# Patient Record
Sex: MALE | Race: WHITE | URBAN - METROPOLITAN AREA
[De-identification: names, ages, dates, MRNs, and addresses within clinical notes are randomized per-mention and may not be internally consistent; named-entity substitution may affect disease eponyms.]

---

## 2021-12-03 ENCOUNTER — OUTPATIENT (OUTPATIENT)
Dept: OUTPATIENT SERVICES | Facility: HOSPITAL | Age: 76
LOS: 1 days | Discharge: HOME | End: 2021-12-03

## 2021-12-03 VITALS
DIASTOLIC BLOOD PRESSURE: 78 MMHG | OXYGEN SATURATION: 97 % | WEIGHT: 179.9 LBS | HEIGHT: 70 IN | SYSTOLIC BLOOD PRESSURE: 133 MMHG | RESPIRATION RATE: 18 BRPM | TEMPERATURE: 97 F | HEART RATE: 95 BPM

## 2021-12-03 VITALS
DIASTOLIC BLOOD PRESSURE: 70 MMHG | SYSTOLIC BLOOD PRESSURE: 136 MMHG | HEART RATE: 91 BPM | RESPIRATION RATE: 18 BRPM | OXYGEN SATURATION: 97 %

## 2021-12-03 DIAGNOSIS — Z98.890 OTHER SPECIFIED POSTPROCEDURAL STATES: Chronic | ICD-10-CM

## 2021-12-03 NOTE — PRE-ANESTHESIA EVALUATION ADULT - NSANTHOSAYNRD_GEN_A_CORE
No. CHRISS screening performed.  STOP BANG Legend: 0-2 = LOW Risk; 3-4 = INTERMEDIATE Risk; 5-8 = HIGH Risk

## 2021-12-03 NOTE — ASU DISCHARGE PLAN (ADULT/PEDIATRIC) - NS MD DC FALL RISK RISK
For information on Fall & Injury Prevention, visit: [email or web address] OR  [email or web address] OR  https://www.cdc.gov/steadi/patient.html

## 2021-12-03 NOTE — ASU PATIENT PROFILE, ADULT - NSICDXPASTSURGICALHX_GEN_ALL_CORE_FT
PAST SURGICAL HISTORY:  H/O basal cell carcinoma excision     H/O carpal tunnel repair     H/O melanoma excision

## 2021-12-03 NOTE — ASU PATIENT PROFILE, ADULT - FALL HARM RISK - UNIVERSAL INTERVENTIONS
Bed in lowest position, wheels locked, appropriate side rails in place/Call bell, personal items and telephone in reach/Instruct patient to call for assistance before getting out of bed or chair/Non-slip footwear when patient is out of bed/[place] to call system/Physically safe environment - no spills, clutter or unnecessary equipment/Purposeful Proactive Rounding/Room/bathroom lighting operational, light cord in reach

## 2021-12-09 DIAGNOSIS — F17.200 NICOTINE DEPENDENCE, UNSPECIFIED, UNCOMPLICATED: ICD-10-CM

## 2021-12-09 DIAGNOSIS — H26.8 OTHER SPECIFIED CATARACT: ICD-10-CM

## 2021-12-09 DIAGNOSIS — J44.9 CHRONIC OBSTRUCTIVE PULMONARY DISEASE, UNSPECIFIED: ICD-10-CM

## 2021-12-09 DIAGNOSIS — Z85.820 PERSONAL HISTORY OF MALIGNANT MELANOMA OF SKIN: ICD-10-CM

## 2021-12-09 DIAGNOSIS — Z85.828 PERSONAL HISTORY OF OTHER MALIGNANT NEOPLASM OF SKIN: ICD-10-CM

## 2021-12-10 ENCOUNTER — OUTPATIENT (OUTPATIENT)
Dept: OUTPATIENT SERVICES | Facility: HOSPITAL | Age: 76
LOS: 1 days | Discharge: HOME | End: 2021-12-10

## 2021-12-10 VITALS
TEMPERATURE: 98 F | WEIGHT: 175.05 LBS | RESPIRATION RATE: 18 BRPM | SYSTOLIC BLOOD PRESSURE: 131 MMHG | HEIGHT: 70 IN | HEART RATE: 69 BPM | DIASTOLIC BLOOD PRESSURE: 73 MMHG

## 2021-12-10 VITALS — SYSTOLIC BLOOD PRESSURE: 137 MMHG | DIASTOLIC BLOOD PRESSURE: 71 MMHG | HEART RATE: 66 BPM | RESPIRATION RATE: 17 BRPM

## 2021-12-10 DIAGNOSIS — Z98.890 OTHER SPECIFIED POSTPROCEDURAL STATES: Chronic | ICD-10-CM

## 2021-12-10 DIAGNOSIS — H26.40 UNSPECIFIED SECONDARY CATARACT: Chronic | ICD-10-CM

## 2021-12-10 NOTE — ASU DISCHARGE PLAN (ADULT/PEDIATRIC) - NS MD DC FALL RISK RISK
For information on Fall & Injury Prevention, visit: [email or web address] OR  [email or web address] OR  https://www.cdc.gov/steadi/patient.html

## 2021-12-10 NOTE — ASU PATIENT PROFILE, ADULT - FALL HARM RISK - HARM RISK INTERVENTIONS

## 2021-12-16 DIAGNOSIS — H26.8 OTHER SPECIFIED CATARACT: ICD-10-CM

## 2021-12-16 DIAGNOSIS — J44.9 CHRONIC OBSTRUCTIVE PULMONARY DISEASE, UNSPECIFIED: ICD-10-CM

## 2021-12-16 DIAGNOSIS — Z85.820 PERSONAL HISTORY OF MALIGNANT MELANOMA OF SKIN: ICD-10-CM

## 2021-12-16 DIAGNOSIS — Z85.828 PERSONAL HISTORY OF OTHER MALIGNANT NEOPLASM OF SKIN: ICD-10-CM

## 2021-12-16 DIAGNOSIS — Z72.0 TOBACCO USE: ICD-10-CM

## 2023-01-25 ENCOUNTER — NON-APPOINTMENT (OUTPATIENT)
Age: 78
End: 2023-01-25

## 2023-02-08 NOTE — ASU DISCHARGE PLAN (ADULT/PEDIATRIC) - ***IN THE EVENT THAT YOU DEVELOP A COMPLICATION AND YOU ARE UNABLE TO REACH YOUR OWN PHYSICIAN, YOU MAY CONTACT:
Called and lvm with patient to reschedule Carotid scan and appt with [facility]. Statement Selected

## 2023-02-15 ENCOUNTER — NON-APPOINTMENT (OUTPATIENT)
Age: 78
End: 2023-02-15

## 2023-02-17 ENCOUNTER — INPATIENT (INPATIENT)
Facility: HOSPITAL | Age: 78
LOS: 3 days | Discharge: HOME | DRG: 200 | End: 2023-02-21
Attending: HOSPITALIST | Admitting: HOSPITALIST
Payer: COMMERCIAL

## 2023-02-17 VITALS
OXYGEN SATURATION: 86 % | SYSTOLIC BLOOD PRESSURE: 155 MMHG | WEIGHT: 160.06 LBS | TEMPERATURE: 97 F | DIASTOLIC BLOOD PRESSURE: 89 MMHG | HEART RATE: 104 BPM | RESPIRATION RATE: 22 BRPM

## 2023-02-17 DIAGNOSIS — H26.40 UNSPECIFIED SECONDARY CATARACT: Chronic | ICD-10-CM

## 2023-02-17 DIAGNOSIS — Z98.890 OTHER SPECIFIED POSTPROCEDURAL STATES: Chronic | ICD-10-CM

## 2023-02-17 LAB
ALBUMIN SERPL ELPH-MCNC: 4 G/DL — SIGNIFICANT CHANGE UP (ref 3.5–5.2)
ALP SERPL-CCNC: 115 U/L — SIGNIFICANT CHANGE UP (ref 30–115)
ALT FLD-CCNC: 15 U/L — SIGNIFICANT CHANGE UP (ref 0–41)
ANION GAP SERPL CALC-SCNC: 10 MMOL/L — SIGNIFICANT CHANGE UP (ref 7–14)
APTT BLD: 33.1 SEC — SIGNIFICANT CHANGE UP (ref 27–39.2)
AST SERPL-CCNC: 18 U/L — SIGNIFICANT CHANGE UP (ref 0–41)
BASE EXCESS BLDV CALC-SCNC: 0.4 MMOL/L — SIGNIFICANT CHANGE UP (ref -2–3)
BASOPHILS # BLD AUTO: 0.05 K/UL — SIGNIFICANT CHANGE UP (ref 0–0.2)
BASOPHILS NFR BLD AUTO: 0.5 % — SIGNIFICANT CHANGE UP (ref 0–1)
BILIRUB SERPL-MCNC: <0.2 MG/DL — SIGNIFICANT CHANGE UP (ref 0.2–1.2)
BUN SERPL-MCNC: 22 MG/DL — HIGH (ref 10–20)
CA-I SERPL-SCNC: 1.21 MMOL/L — SIGNIFICANT CHANGE UP (ref 1.15–1.33)
CALCIUM SERPL-MCNC: 9.4 MG/DL — SIGNIFICANT CHANGE UP (ref 8.4–10.4)
CHLORIDE SERPL-SCNC: 105 MMOL/L — SIGNIFICANT CHANGE UP (ref 98–110)
CO2 SERPL-SCNC: 26 MMOL/L — SIGNIFICANT CHANGE UP (ref 17–32)
CREAT SERPL-MCNC: 0.9 MG/DL — SIGNIFICANT CHANGE UP (ref 0.7–1.5)
EGFR: 87 ML/MIN/1.73M2 — SIGNIFICANT CHANGE UP
EOSINOPHIL # BLD AUTO: 0.02 K/UL — SIGNIFICANT CHANGE UP (ref 0–0.7)
EOSINOPHIL NFR BLD AUTO: 0.2 % — SIGNIFICANT CHANGE UP (ref 0–8)
GAS PNL BLDV: 136 MMOL/L — SIGNIFICANT CHANGE UP (ref 136–145)
GAS PNL BLDV: SIGNIFICANT CHANGE UP
GLUCOSE SERPL-MCNC: 130 MG/DL — HIGH (ref 70–99)
HCO3 BLDV-SCNC: 27 MMOL/L — SIGNIFICANT CHANGE UP (ref 22–29)
HCT VFR BLD CALC: 46 % — SIGNIFICANT CHANGE UP (ref 42–52)
HCT VFR BLDA CALC: 46 % — SIGNIFICANT CHANGE UP (ref 39–51)
HGB BLD CALC-MCNC: 15.3 G/DL — SIGNIFICANT CHANGE UP (ref 12.6–17.4)
HGB BLD-MCNC: 14.8 G/DL — SIGNIFICANT CHANGE UP (ref 14–18)
IMM GRANULOCYTES NFR BLD AUTO: 0.3 % — SIGNIFICANT CHANGE UP (ref 0.1–0.3)
INR BLD: 1.02 RATIO — SIGNIFICANT CHANGE UP (ref 0.65–1.3)
LACTATE BLDV-MCNC: 2 MMOL/L — SIGNIFICANT CHANGE UP (ref 0.5–2)
LYMPHOCYTES # BLD AUTO: 1.45 K/UL — SIGNIFICANT CHANGE UP (ref 1.2–3.4)
LYMPHOCYTES # BLD AUTO: 15.4 % — LOW (ref 20.5–51.1)
MAGNESIUM SERPL-MCNC: 2 MG/DL — SIGNIFICANT CHANGE UP (ref 1.8–2.4)
MCHC RBC-ENTMCNC: 30.7 PG — SIGNIFICANT CHANGE UP (ref 27–31)
MCHC RBC-ENTMCNC: 32.2 G/DL — SIGNIFICANT CHANGE UP (ref 32–37)
MCV RBC AUTO: 95.4 FL — HIGH (ref 80–94)
MONOCYTES # BLD AUTO: 0.22 K/UL — SIGNIFICANT CHANGE UP (ref 0.1–0.6)
MONOCYTES NFR BLD AUTO: 2.3 % — SIGNIFICANT CHANGE UP (ref 1.7–9.3)
NEUTROPHILS # BLD AUTO: 7.66 K/UL — HIGH (ref 1.4–6.5)
NEUTROPHILS NFR BLD AUTO: 81.3 % — HIGH (ref 42.2–75.2)
NRBC # BLD: 0 /100 WBCS — SIGNIFICANT CHANGE UP (ref 0–0)
NT-PROBNP SERPL-SCNC: 721 PG/ML — HIGH (ref 0–300)
PCO2 BLDV: 52 MMHG — SIGNIFICANT CHANGE UP (ref 42–55)
PH BLDV: 7.33 — SIGNIFICANT CHANGE UP (ref 7.32–7.43)
PLATELET # BLD AUTO: 252 K/UL — SIGNIFICANT CHANGE UP (ref 130–400)
PO2 BLDV: 36 MMHG — SIGNIFICANT CHANGE UP
POTASSIUM BLDV-SCNC: 5.1 MMOL/L — SIGNIFICANT CHANGE UP (ref 3.5–5.1)
POTASSIUM SERPL-MCNC: 4.8 MMOL/L — SIGNIFICANT CHANGE UP (ref 3.5–5)
POTASSIUM SERPL-SCNC: 4.8 MMOL/L — SIGNIFICANT CHANGE UP (ref 3.5–5)
PROT SERPL-MCNC: 7.5 G/DL — SIGNIFICANT CHANGE UP (ref 6–8)
PROTHROM AB SERPL-ACNC: 11.6 SEC — SIGNIFICANT CHANGE UP (ref 9.95–12.87)
RBC # BLD: 4.82 M/UL — SIGNIFICANT CHANGE UP (ref 4.7–6.1)
RBC # FLD: 13.6 % — SIGNIFICANT CHANGE UP (ref 11.5–14.5)
SAO2 % BLDV: 56.4 % — SIGNIFICANT CHANGE UP
SARS-COV-2 RNA SPEC QL NAA+PROBE: SIGNIFICANT CHANGE UP
SODIUM SERPL-SCNC: 141 MMOL/L — SIGNIFICANT CHANGE UP (ref 135–146)
TROPONIN T SERPL-MCNC: <0.01 NG/ML — SIGNIFICANT CHANGE UP
WBC # BLD: 9.43 K/UL — SIGNIFICANT CHANGE UP (ref 4.8–10.8)
WBC # FLD AUTO: 9.43 K/UL — SIGNIFICANT CHANGE UP (ref 4.8–10.8)

## 2023-02-17 PROCEDURE — 84100 ASSAY OF PHOSPHORUS: CPT

## 2023-02-17 PROCEDURE — 83735 ASSAY OF MAGNESIUM: CPT

## 2023-02-17 PROCEDURE — 71275 CT ANGIOGRAPHY CHEST: CPT | Mod: 26,MA

## 2023-02-17 PROCEDURE — 99406 BEHAV CHNG SMOKING 3-10 MIN: CPT

## 2023-02-17 PROCEDURE — 99223 1ST HOSP IP/OBS HIGH 75: CPT

## 2023-02-17 PROCEDURE — 80048 BASIC METABOLIC PNL TOTAL CA: CPT

## 2023-02-17 PROCEDURE — 71045 X-RAY EXAM CHEST 1 VIEW: CPT

## 2023-02-17 PROCEDURE — 71045 X-RAY EXAM CHEST 1 VIEW: CPT | Mod: 26,76

## 2023-02-17 PROCEDURE — 93005 ELECTROCARDIOGRAM TRACING: CPT

## 2023-02-17 PROCEDURE — 36415 COLL VENOUS BLD VENIPUNCTURE: CPT

## 2023-02-17 PROCEDURE — 80053 COMPREHEN METABOLIC PANEL: CPT

## 2023-02-17 PROCEDURE — 85027 COMPLETE CBC AUTOMATED: CPT

## 2023-02-17 PROCEDURE — 93010 ELECTROCARDIOGRAM REPORT: CPT

## 2023-02-17 PROCEDURE — 94640 AIRWAY INHALATION TREATMENT: CPT

## 2023-02-17 NOTE — ED PROVIDER NOTE - CONSIDERATION OF ADMISSION OBSERVATION
Patient requires inpatient hospitalization - monitored setting. Consideration of Admission/Observation

## 2023-02-17 NOTE — ED PROVIDER NOTE - CLINICAL SUMMARY MEDICAL DECISION MAKING FREE TEXT BOX
77-year-old male with a past medical history of COPD, left lung cancer, status post left lung biopsy 4 days ago at [facility] complicated by pneumothorax and chest tube placement, chest tube removed 3 days ago presents for shortness of breath that has progressively been getting worse throughout the day.  Noted to be saturating in the high 80s here, only a few word sentences.  Lungs with decreased breath sounds on the left, clear on the right.  Chest x-ray with large pneumothorax.  Clinically not tension.  Also with left leg swelling.  States that he had not noticed any difference recently.  Left-sided pigtail placed with improvement in reexpansion.  CT done and negative for PE.  Labs overall reassuring.  EKG nonischemic.  Admitted to medicine.  On a couple of liters nasal cannula on admission with no respiratory distress

## 2023-02-17 NOTE — ED PROCEDURE NOTE - CPROC ED SITE PREP1
Your plan is as follows:   1. Follow up in 2 years with MRA brain WO contrast. We will call you in 1.5 years to schedule   2. Continue all medications as prescribed  3. Check blood pressure regularly, goal being 130/80.      SEEK IMMEDIATE MEDICAL ATTENTION OR CALL 911 for any sudden change in neurological/mental status such as:   · B: (Balance) Loss of balance, dizziness, or lightheadedness  · E: (Eyes) loss of vision, blurry vision, double vision, change in vision  · F: (Face) Facial droop, typically one-sided   · A: (Arm) Arm or leg weakness, numbness, or tingling, typically on one side of the body  · S: (Speech) Speech difficulties: trouble speaking, understanding others, confusion, or slurred speech  · T: (Terrible headache) Terrible headache that comes on suddenly, often described as the worse headache of your life          If you are taking antiplatelet medications  Signs and symptoms of bleeding should be monitored closely while taking antiplatelet medications. Antiplatelet medications are aspirin, Plavix (clopidogrel), or Brilinta (ticagrelor). These signs and symptoms include:  ? Coffee ground emesis (vomit which looks like coffee grounds)   ? Tarry stools   ? Nose bleeds   ? Excessive gum bleeding with teeth brushing   ? Blood in urine     If you are taking aspirin, Plavix (clopidgrel), or Brilinta (ticagrelor):   We may ask you to have these medications monitored by having blood drawn. A lab known as an aspirin response (ASAr) will assess whether or not the current dose of aspirin you are taking is providing a therapeutic response. Another lab, known as a Platelet P2Y12, assesses whether the dose of Plavix or Brilinta you are taking is providing a therapeutic response. These labs are very time sensitive and must be completed within one hour of being drawn. With that restriction, they can only be completed at certain labs in certain locations:     [facility]  [address] [place] [address]  Phone: [phone]  Hours: Monday - Friday 6:00am to 5:00pm   Saturday 5:00am to 2:00pm    [facility] - ACL Lab  [address] [place] [address]  Phone: [phone]  Hours: Monday - Friday 8:00am to 5:00pm    [facility] - ACL Lab  [address] [place] [address]  Phone: [phone]  Hours: Monday - Thursday 7:00am to 6:00pm   Friday 7:00am - 5:30pm   Saturday: 7:30am - 12:00pm       Contact Information  : [phone]  Our office hours are 8:00am to 4:30pm. You would contact the  if you are unable to make an appointment, would like to reschedule an appointment, verify an appointment date or time, or ask a non-emergent question. The staff at the  will assist you in a timely manner.     Surgery Schedulers: [phone]  You would contact the surgery schedulers if you are due to be scheduled for a diagnostic cerebral angiogram or a procedure. You may speak with any of our surgery schedulers for this: Cyndee Donato or Tesha.     RNs: [phone]  You would contact the RNs with any symptoms you may be experiencing or any questions that require prompt attention. The RNs will contact the Neuro Interventional team to get recommendations as needed. You may speak with any of our RNs: Angela Scruggs Kayla, Savannah or Geovanny    Pre-services Department: [phone]  You would contact the pre-services department to schedule any imaging that our services orders, such as: MRI, MRA, CT, CTA, or Ultrasound. They will work with you to schedule in a timeframe which works for you and will try to schedule it closer to your home if possible. Some tests can only be completed at [facility] due to the specialized equipment needed.     [place] Physician Referral Line: [phone]  You would contact this department for any referrals you may have received for specialists, therapy, or other  chlorhexidine services. They will assist in finding providers in your area.     Neurodiagnostics Department: [phone]  You would contact the neurodiagnostics department to schedule a TCD (transcranial doppler), if ordered by your physician.       Thank you for choosing the [facility] [place], Leeann Early MD, and our team as your Neuro Interventional Specialists.  We actively use feedback to constantly improve and deliver the best care possible. To provide the best experience we are collecting feedback from you on how we performed.  You may receive a survey in the mail to evaluate how we did. Please take a moment and share your thoughts.      If for any reason you feel that we did not meet your expectations or you want to share a positive experience, please give us a call. Your feedback helps us know how we are doing and what we can be doing better.                                                   The Mediterranean Diet          Mediterranean diet basics:  • Daily consumption of vegetables, fruits, whole grains, and healthy fats (like olive oil or mashed avocado)  • Weekly intake of fish, poultry, beans and eggs  • Moderate portions of dairy products  • Limited intake of red meat  • Red wine in moderation  • Avoid processed foods    Sample diet:  Breakfast:  1 cup greek yogurt toped with 1/2 cup fresh fruit and 1-2 ounces of chopped nuts  Lunch:  2 cups greens topped with tomatoes, olives and vinaigrette dressing.  Christianne bread with hummus.  Dinner:  Baked salmon with roasted potatoes and broccoli          Activity Recommendations      Either 150 minutes per week of moderate activity which breaks down to 30 minutes 5 days a week or 75 minutes of vigorous activity per week.    Moderate activity examples:  • Brisk walking (at least 2.5 mph)  • Water aerobics  • Dancing (ballroom or social)  • Gardening  • Tennis (doubles)  • Biking slower than 10 mph    Vigorous activity examples:  • Hiking uphill or  with a heavy backpack  • Running  • Swimming laps  • Aerobic dancing  • Heavy yard work (such as continuous digging or hoeing)  • Tennis (singles)  • Biking 10 mph or faster  • Jumping rope

## 2023-02-17 NOTE — ED PROVIDER NOTE - OBJECTIVE STATEMENT
78 yo M with PMH of COPD not on home O2, Lung CA recently diagnosed s/p biopsy ([facility]) on 2/13 c/b PTX s/p chest tube taken out after 2 days presenting for SOB that started yesterday and got acutely worse today. Patient notes his sat was in high 80s today (normal 94-95% on RA). Endorses non-productive cough for the past few days. Also endorses LLE edema. Patient denies fever, cough, congestion, travel, sick contacts, hormone use, NVD, dysuria.

## 2023-02-17 NOTE — ED PROVIDER NOTE - PHYSICAL EXAMINATION
CONSTITUTIONAL: well-appearing, in NAD  SKIN: Warm dry, normal skin turgor  HEAD: NCAT  EYES: EOMI, PERRLA, no scleral icterus, conjunctiva pink  ENT: normal pharynx with no erythema or exudates  NECK: Supple; non tender. Full ROM.  CARD: RRR, no murmurs.  RESP: decreased breath sounds on L;   ABD: soft, non-tender, non-distended, no rebound or guarding.  EXT: Full ROM, no bony tenderness, no pedal edema, no calf tenderness  NEURO: normal motor. normal sensory. Normal gait.  PSYCH: Cooperative, appropriate.

## 2023-02-17 NOTE — PROCEDURAL SAFETY CHECKLIST WITH OR WITHOUT SEDATION - NSBEDADDLTIME7_GEN_ALL_CORE
[No Acute Distress] : no acute distress [Normal Appearance] : normal appearance [Normal Oropharynx] : normal oropharynx [IV] : Mallampati Class: IV [Normal Rate/Rhythm] : normal rate/rhythm [No Neck Mass] : no neck mass [Normal S1, S2] : normal s1, s2 [No Resp Distress] : no resp distress [No Murmurs] : no murmurs [Clear to Auscultation Bilaterally] : clear to auscultation bilaterally [Normal Gait] : normal gait [No Abnormalities] : no abnormalities [Benign] : benign [No Clubbing] : no clubbing [No Cyanosis] : no cyanosis [Normal Color/ Pigmentation] : normal color/ pigmentation [FROM] : FROM [No Edema] : no edema [No Focal Deficits] : no focal deficits [Oriented x3] : oriented x3 [Normal Affect] : normal affect not applicable

## 2023-02-17 NOTE — ED ADULT TRIAGE NOTE - CHIEF COMPLAINT QUOTE
Patient presents to ED with SOB. Patient had a R lung biopsy on Monday then had a pneumothorax which required a chest tube and patient was discharged Tuesday. Last lung biopsy 1/30/23 on left lung showed lung CA. Patient unable to speak in full sentences, tachypneic in triage.

## 2023-02-18 DIAGNOSIS — J93.9 PNEUMOTHORAX, UNSPECIFIED: ICD-10-CM

## 2023-02-18 PROBLEM — J44.9 CHRONIC OBSTRUCTIVE PULMONARY DISEASE, UNSPECIFIED: Chronic | Status: ACTIVE | Noted: 2021-12-03

## 2023-02-18 LAB
ALBUMIN SERPL ELPH-MCNC: 3.7 G/DL — SIGNIFICANT CHANGE UP (ref 3.5–5.2)
ALP SERPL-CCNC: 99 U/L — SIGNIFICANT CHANGE UP (ref 30–115)
ALT FLD-CCNC: 14 U/L — SIGNIFICANT CHANGE UP (ref 0–41)
ANION GAP SERPL CALC-SCNC: 11 MMOL/L — SIGNIFICANT CHANGE UP (ref 7–14)
ANION GAP SERPL CALC-SCNC: 9 MMOL/L — SIGNIFICANT CHANGE UP (ref 7–14)
AST SERPL-CCNC: 17 U/L — SIGNIFICANT CHANGE UP (ref 0–41)
BILIRUB SERPL-MCNC: 0.3 MG/DL — SIGNIFICANT CHANGE UP (ref 0.2–1.2)
BUN SERPL-MCNC: 16 MG/DL — SIGNIFICANT CHANGE UP (ref 10–20)
BUN SERPL-MCNC: 21 MG/DL — HIGH (ref 10–20)
CALCIUM SERPL-MCNC: 8.5 MG/DL — SIGNIFICANT CHANGE UP (ref 8.4–10.5)
CALCIUM SERPL-MCNC: 9.3 MG/DL — SIGNIFICANT CHANGE UP (ref 8.4–10.5)
CHLORIDE SERPL-SCNC: 103 MMOL/L — SIGNIFICANT CHANGE UP (ref 98–110)
CHLORIDE SERPL-SCNC: 107 MMOL/L — SIGNIFICANT CHANGE UP (ref 98–110)
CO2 SERPL-SCNC: 25 MMOL/L — SIGNIFICANT CHANGE UP (ref 17–32)
CO2 SERPL-SCNC: 29 MMOL/L — SIGNIFICANT CHANGE UP (ref 17–32)
CREAT SERPL-MCNC: 0.9 MG/DL — SIGNIFICANT CHANGE UP (ref 0.7–1.5)
CREAT SERPL-MCNC: 1.1 MG/DL — SIGNIFICANT CHANGE UP (ref 0.7–1.5)
EGFR: 68 ML/MIN/1.73M2 — SIGNIFICANT CHANGE UP
EGFR: 87 ML/MIN/1.73M2 — SIGNIFICANT CHANGE UP
GLUCOSE SERPL-MCNC: 173 MG/DL — HIGH (ref 70–99)
GLUCOSE SERPL-MCNC: 70 MG/DL — SIGNIFICANT CHANGE UP (ref 70–99)
MAGNESIUM SERPL-MCNC: 2 MG/DL — SIGNIFICANT CHANGE UP (ref 1.8–2.4)
PHOSPHATE SERPL-MCNC: 3 MG/DL — SIGNIFICANT CHANGE UP (ref 2.1–4.9)
POTASSIUM SERPL-MCNC: 4.5 MMOL/L — SIGNIFICANT CHANGE UP (ref 3.5–5)
POTASSIUM SERPL-MCNC: 5.8 MMOL/L — HIGH (ref 3.5–5)
POTASSIUM SERPL-SCNC: 4.5 MMOL/L — SIGNIFICANT CHANGE UP (ref 3.5–5)
POTASSIUM SERPL-SCNC: 5.8 MMOL/L — HIGH (ref 3.5–5)
PROT SERPL-MCNC: 6.5 G/DL — SIGNIFICANT CHANGE UP (ref 6–8)
SODIUM SERPL-SCNC: 139 MMOL/L — SIGNIFICANT CHANGE UP (ref 135–146)
SODIUM SERPL-SCNC: 145 MMOL/L — SIGNIFICANT CHANGE UP (ref 135–146)

## 2023-02-18 PROCEDURE — 93010 ELECTROCARDIOGRAM REPORT: CPT

## 2023-02-18 RX ORDER — SODIUM ZIRCONIUM CYCLOSILICATE 10 G/10G
10 POWDER, FOR SUSPENSION ORAL ONCE
Refills: 0 | Status: COMPLETED | OUTPATIENT
Start: 2023-02-18 | End: 2023-02-18

## 2023-02-18 RX ORDER — IPRATROPIUM/ALBUTEROL SULFATE 18-103MCG
3 AEROSOL WITH ADAPTER (GRAM) INHALATION EVERY 6 HOURS
Refills: 0 | Status: DISCONTINUED | OUTPATIENT
Start: 2023-02-18 | End: 2023-02-21

## 2023-02-18 RX ORDER — INSULIN HUMAN 100 [IU]/ML
10 INJECTION, SOLUTION SUBCUTANEOUS ONCE
Refills: 0 | Status: COMPLETED | OUTPATIENT
Start: 2023-02-18 | End: 2023-02-18

## 2023-02-18 RX ORDER — BUDESONIDE AND FORMOTEROL FUMARATE DIHYDRATE 160; 4.5 UG/1; UG/1
2 AEROSOL RESPIRATORY (INHALATION)
Refills: 0 | Status: DISCONTINUED | OUTPATIENT
Start: 2023-02-18 | End: 2023-02-18

## 2023-02-18 RX ORDER — ENOXAPARIN SODIUM 100 MG/ML
40 INJECTION SUBCUTANEOUS EVERY 24 HOURS
Refills: 0 | Status: DISCONTINUED | OUTPATIENT
Start: 2023-02-18 | End: 2023-02-21

## 2023-02-18 RX ORDER — DEXTROSE 50 % IN WATER 50 %
50 SYRINGE (ML) INTRAVENOUS ONCE
Refills: 0 | Status: COMPLETED | OUTPATIENT
Start: 2023-02-18 | End: 2023-02-18

## 2023-02-18 RX ORDER — BUDESONIDE AND FORMOTEROL FUMARATE DIHYDRATE 160; 4.5 UG/1; UG/1
2 AEROSOL RESPIRATORY (INHALATION)
Refills: 0 | Status: DISCONTINUED | OUTPATIENT
Start: 2023-02-18 | End: 2023-02-21

## 2023-02-18 RX ORDER — INFLUENZA VIRUS VACCINE 15; 15; 15; 15 UG/.5ML; UG/.5ML; UG/.5ML; UG/.5ML
0.7 SUSPENSION INTRAMUSCULAR ONCE
Refills: 0 | Status: DISCONTINUED | OUTPATIENT
Start: 2023-02-18 | End: 2023-02-21

## 2023-02-18 RX ORDER — TIOTROPIUM BROMIDE 18 UG/1
2 CAPSULE ORAL; RESPIRATORY (INHALATION) DAILY
Refills: 0 | Status: DISCONTINUED | OUTPATIENT
Start: 2023-02-18 | End: 2023-02-21

## 2023-02-18 RX ADMIN — INSULIN HUMAN 10 UNIT(S): 100 INJECTION, SOLUTION SUBCUTANEOUS at 12:45

## 2023-02-18 RX ADMIN — Medication 60 MILLIGRAM(S): at 11:15

## 2023-02-18 RX ADMIN — BUDESONIDE AND FORMOTEROL FUMARATE DIHYDRATE 2 PUFF(S): 160; 4.5 AEROSOL RESPIRATORY (INHALATION) at 21:40

## 2023-02-18 RX ADMIN — BUDESONIDE AND FORMOTEROL FUMARATE DIHYDRATE 2 PUFF(S): 160; 4.5 AEROSOL RESPIRATORY (INHALATION) at 09:22

## 2023-02-18 RX ADMIN — Medication 3 MILLILITER(S): at 13:15

## 2023-02-18 RX ADMIN — Medication 3 MILLILITER(S): at 20:32

## 2023-02-18 RX ADMIN — ENOXAPARIN SODIUM 40 MILLIGRAM(S): 100 INJECTION SUBCUTANEOUS at 05:38

## 2023-02-18 RX ADMIN — Medication 50 MILLILITER(S): at 12:46

## 2023-02-18 RX ADMIN — SODIUM ZIRCONIUM CYCLOSILICATE 10 GRAM(S): 10 POWDER, FOR SUSPENSION ORAL at 12:46

## 2023-02-18 NOTE — PATIENT PROFILE ADULT - NS PRO AD NO ADVANCE DIRECTIVE
Patient states he has DNR/DNI in place, but copy is not available at this time, awaiting attending to complete H&P and sign advanced directive./Yes

## 2023-02-18 NOTE — CONSULT NOTE ADULT - ASSESSMENT
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 The patient underwent left IR-guided lung Bx on 2/13, and post-procedurally developed left PTX requiring placement of chest tube for 24 hours. Chest tube was removed on 2/14 with radiographic resolution of PTX. He now presents with recurrent 50% left PTX s/p left pigtail catheter by ED on 2/17.     #Recurrent 50% left PTX, s/p left pigtail catheter on 2/17. Follow up CT imaging shows near resolution with few percent PTX. Patient is now asymptomatic, and is not hypoxic.   -S/p left IR-guided lung Bx on 2/13 at [facility], post-procedurally developed left PTX requiring chest tube placement. Removed on 2/14.     #COPD - not on home O2. On Sinacort and albuterol PRN     #Newly diagnosed right-sided lung CA. Type unknown. Receives all care through [facility] through 911  program.    #Recent Hx left great toe gouty flare, s/p steroids. Now resolved.     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 The patient underwent left IR-guided lung Bx on 2/13, and post-procedurally developed left PTX requiring placement of chest tube for 24 hours. Chest tube was removed on 2/14 with radiographic resolution of PTX. He now presents with recurrent 50% left PTX s/p left pigtail catheter by ED on 2/17.     #Recurrent 50% left PTX, s/p left pigtail catheter on 2/17. Follow up CT imaging shows near resolution with few percent PTX. Patient is now asymptomatic, and is not hypoxic.   -S/p left IR-guided lung Bx on 2/13 at [facility], post-procedurally developed left PTX requiring chest tube placement. Removed on 2/14.     #COPD - not on home O2. On Sinacort and albuterol PRN     #Newly diagnosed right-sided lung CA. Type unknown. Receives all care through [facility] through 911  program.    #Recent Hx left great toe gouty flare, s/p steroids. Now resolved.     #HLD      PLAN:  -Leave pigtail to suction today  -CXR in AM  -If no new PTX on tomorrow's CXR, will place pigtail to water seal and repeat CXR four hours after  -Monitor O2 needs, wean O2 as needed  -D/w patient     Lines/Tubes: PIV, left pigtail catheter     Above plan discussed with Attending Surgeon Dr. Bravo, patient, and Primary team  02-18-23 @ 15:45

## 2023-02-18 NOTE — CONSULT NOTE ADULT - ASSESSMENT
Impression:  Acute chronic COPD stable  No Impending respiratory failure  no pneumonia  LARRY stellate lesion s/p bx  spont PTX 4 days after bx    SUGGEST:  T surgery consult poss need for VATS  Check O2 sat on NC, record, continue O2 as necessary to maintain sats > 90%  cont CT to suction   bronchodilator treatments PRN  complete course of antibiotics  analgesia  OOB to chair  GI and DVT prophylaxis  pulmonary toilet  daily CXR  cont OP inhalers  cough suppressants

## 2023-02-18 NOTE — ED ADULT NURSE NOTE - NSIMPLEMENTINTERV_GEN_ALL_ED
Implemented All Universal Safety Interventions:  [place] to call system. Call bell, personal items and telephone within reach. Instruct patient to call for assistance. Room bathroom lighting operational. Non-slip footwear when patient is off stretcher. Physically safe environment: no spills, clutter or unnecessary equipment. Stretcher in lowest position, wheels locked, appropriate side rails in place.

## 2023-02-18 NOTE — H&P ADULT - HISTORY OF PRESENT ILLNESS
78 yo M with PMH of COPD not on home O2, Lung CA recently diagnosed s/p biopsy ([facility]) on 2/13 c/b PTX s/p chest tube taken out after 2 days presenting for SOB that started yesterday and got acutely worse today. Patient notes his sat was in high 80s today (normal 94-95% on RA). Endorses non-productive cough for the past few days. Also endorses LLE edema. Patient denies fever, cough, congestion, travel, sick contacts, hormone use, NVD, dysuria.    Vital Signs Last 24 Hrs  T(C): 36.1 (18 Feb 2023 01:52), Max: 36.1 (18 Feb 2023 00:14)  T(F): 97 (18 Feb 2023 01:52), Max: 97 (18 Feb 2023 00:14)  HR: 78 (18 Feb 2023 01:52) (78 - 104)  BP: 181/89 (18 Feb 2023 01:52) (155/89 - 181/89)  BP(mean): --  RR: 19 (18 Feb 2023 01:52) (19 - 22)  SpO2: 96% (18 Feb 2023 01:52) (86% - 97%)    Parameters below as of 18 Feb 2023 00:14  Patient On (Oxygen Delivery Method): nasal cannula  O2 Flow (L/min): 2   76 yo M with PMH of COPD not on home O2, Lung CA recently diagnosed s/p biopsy ([facility]) on 2/13 complicated by  pneumothorax s/p chest tube taken out after 1 day presenting for SOB. Patient mentions that dyspnea started on day of day of presentation and got acutely worse today. Denies any sudden movements prior symptoms, no lifting of heavy weights. Patient notes his sat was in high 80s today (normal 94-95% on RA). Endorses non-productive cough for the past few days. Also endorses LLE edema. Patient denies fever, cough, congestion, travel, sick contacts, hormone use, NVD, dysuria.    Vital Signs Last 24 Hrs  T(C): 36.1 (18 Feb 2023 01:52), Max: 36.1 (18 Feb 2023 00:14)  T(F): 97 (18 Feb 2023 01:52), Max: 97 (18 Feb 2023 00:14)  HR: 78 (18 Feb 2023 01:52) (78 - 104)  BP: 181/89 (18 Feb 2023 01:52) (155/89 - 181/89)  BP(mean): --  RR: 19 (18 Feb 2023 01:52) (19 - 22)  SpO2: 96% (18 Feb 2023 01:52) (86% - 97%)    Parameters below as of 18 Feb 2023 00:14  Patient On (Oxygen Delivery Method): nasal cannula  O2 Flow (L/min): 2

## 2023-02-18 NOTE — H&P ADULT - NSHPREVIEWOFSYSTEMS_GEN_ALL_CORE
Review of Systems:  •	CONSTITUTIONAL - No fever  •	SKIN - No rash  •	HEMATOLOGIC - No abnormal bleeding or bruising  •	RESPIRATORY - mild  shortness of breath, No cough, Mid tenderness around chest tube   •	CARDIAC -No chest pain  •	GI - No abdominal pain  •                    - No dysuria   •	ENDO - No polydipsia, No polyuria, No heat/cold intolerance  •	MUSCULOSKELETAL - No joint paint, No swelling, No back pain  All other systems negative, unless specified in HPI

## 2023-02-18 NOTE — PATIENT PROFILE ADULT - NSPROPOAPRESSUREINJURY_GEN_A_NUR
LOV 3/15/22.   Last filled on 9/19/22.  Lab work done on 3/11/22, labs and appt is now overdue .  Meds refilled per epic.      no

## 2023-02-18 NOTE — H&P ADULT - ATTENDING COMMENTS
78 yo M with PMH of COPD not on home O2, Lung CA recently diagnosed s/p biopsy ([facility]) on 2/13 complicated by  pneumothorax s/p chest tube taken out after 1 day presenting for SOB. Found ot have Iatrogenic Pneumothorax s/p Lung biopsy stay complicated  by  clinical signs of  copd exacerbation. Patient  also is  current smoker. Smoking cessation counseling given. He wishes to follow with his own oncologist at [facility].  Will  need surgery  follow up for Chest  Tube management. Need COPD  optimization:  Titrate supplemental O2 to maintain SpO2 92-96%; avoid hyperoxia and wean off O2 gradually. Symbicort 160-4.5mcg 2 puffs BID; Spiriva 2.5 2 puffss daily. Nebulizer treatment Q6 PRN.  Continue methylprednisolone 40mg IV Q8H. > Pulmonary f/u. Imaging   CTA chest: Bilateral Hilar LN.       VITAL SIGNS: AFebrile, vital signs stable  CONSTITUTIONAL: Well-developed; well-nourished; in no acute distress.  SKIN: Skin exam is warm and dry, no acute rash.  HEAD: Normocephalic; atraumatic. post surgical scars   EYES: Pupils  reactive to light, Extraocular movements intact; conjunctiva and sclera clear.  ENT: No nasal discharge; airway clear. Moist mucus membranes.  NECK: Supple; non tender. No rigidity  CARD: Regular rate and rhythm. Normal S1, S2; no murmurs, gallops, or rubs.  RESP: Mild  wheezes, rales on Auscultation worse left side  no rhonchi. Chest Tube in place,  Minimal  draining   ABD: Abdomen soft; non-tender; non-distended  EXT: Normal ROM. No clubbing, cyanosis or edema.   NEURO: Alert and oriented x 3. No focal deficits.  PSYCH: cooperative, appropriate.     Vital Signs (24 Hrs):  T(C): 36.1 (02-18-23 @ 05:10), Max: 36.1 (02-18-23 @ 00:14)  HR: 63 (02-18-23 @ 05:10) (63 - 104)  BP: 144/71 (02-18-23 @ 05:10) (144/71 - 181/89)  RR: 19 (02-18-23 @ 05:10) (19 - 22)  SpO2: 97% (02-18-23 @ 05:10) (86% - 97%    Seen  on 02/17/23

## 2023-02-18 NOTE — H&P ADULT - ASSESSMENT
76 yo M with PMH of COPD not on home O2, Lung CA recently diagnosed s/p biopsy ([facility]) on 2/13 complicated by  pneumothorax s/p chest tube taken out after 1 day presenting for SOB.    # Iatrogenic Left pneumothorax   # Lung Ca  # Recent Lung Biopsy  # COPD  # Current Smoker  - Patient had recent left lung biopsy at [facility]   - Left pneumothorax post procedure s/p Chest tube > Removed after 24hrs   - Recurrent Left pneumothorax  - s/p L chest tube 02/18  - CTA chest: Bilateral Hilar LN  - currently on Home O2  - C/S Pulm: Assessment of this recurrent pneumothorax. Need for further intervention   - Chest Xray in the AM    #DVT - lovenox sq   #GI - N/A  #Diet - Regular   #Activity - IAT   #Code - Full code     Disposition - IP  Med/Rec: Completed

## 2023-02-18 NOTE — CONSULT NOTE ADULT - SUBJECTIVE AND OBJECTIVE BOX
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 He was a  during 911.   In September 2022, he developed COVID infection that caused an ongoing cough and fatigue for 4-6 weeks, never requiring hospitalization or ABX. In October 2022, he developed sudden onset voice hoarseness. 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   He underwent Bx of the right lung nodule that was positive for CA, he does not know what type. His surgeon through [facility] (which was referred to him based on the 911  program he's a part of) told him the right lung CA was resectable, but he needed to know what is in the left lung first. Therefore on 2/13, the patient underwent IR guided left lung Bx. Less than one hour after procedure, he developed acute SOB. CXR revealed PTX, left chest tube placed, and he immediately felt better. The chest tube was in for 24 hours, follow up CXR showed resolution of PTX, so the CT was removed and he was discharged on 2/14. [place] well at that time.   He had an acute left great toe gouty flare on 2/16 requiring urgent care visit and oral prednisone. This has now resolved.     Now, he returns on 2/17 with acute onset SOB that felt the same as the last PTX. No chest pain or worsening cough. No fevers/chills. He has a pulse ox at home, and it was in the upper 80s. He lives with his daughter on [place], so he came here.     Upon arrival, was noted to have significant left PTX, and left pigtail catheter was placed by the ED. Initially he was on 3L, but currently feels great and is on 0.5L of O2 via NC.   Today, he has no complaints. No CP, SOB, wheezing, or chest tightness. Chronic cough that is unchanged.     Vital Signs Last 24 Hrs  T(C): 36.1 (18 Feb 2023 01:52), Max: 36.1 (18 Feb 2023 00:14)  T(F): 97 (18 Feb 2023 01:52), Max: 97 (18 Feb 2023 00:14)  HR: 78 (18 Feb 2023 01:52) (78 - 104)  BP: 181/89 (18 Feb 2023 01:52) (155/89 - 181/89)  BP(mean): --  RR: 19 (18 Feb 2023 01:52) (19 - 22)  SpO2: 96% (18 Feb 2023 01:52) (86% - 97%)    Parameters below as of 18 Feb 2023 00:14  Patient On (Oxygen Delivery Method): nasal cannula  O2 Flow (L/min): 2   (18 Feb 2023 02:22)    PAST MEDICAL & SURGICAL HISTORY:  COPD, mild  H/O carpal tunnel repair  H/O basal cell carcinoma excision  H/O melanoma excision  After cataract, bilateral  Newly diagnosed right lung CA    Home Medications:  budesonide-formoterol 160 mcg-4.5 mcg/inh inhalation aerosol: 2 puff(s) inhaled 2 times a day (18 Feb 2023 02:54)  Colace 50 mg oral capsule: 1 cap(s) orally 2 times a day (18 Feb 2023 02:54)  Symbicort 80 mcg-4.5 mcg/inh inhalation aerosol: 2 puff(s) inhaled 2 times a day (18 Feb 2023 02:54)  Tylenol 325 mg oral tablet: 2 tab(s) orally every 4 hours, As Needed (18 Feb 2023 02:54)  Vitamin D2 50 mcg (2000 intl units) oral capsule: 1 cap(s) orally once a day (18 Feb 2023 02:54)    VITALS:  T(F): 98.5 (02-18-23 @ 13:20), Max: 98.5 (02-18-23 @ 13:20)  HR: 78 (02-18-23 @ 13:20) (63 - 104)  BP: 127/65 (02-18-23 @ 13:20) (127/65 - 181/89)  RR: 19 (02-18-23 @ 05:10) (19 - 22)  SpO2: 96% (02-18-23 @ 13:20) (86% - 97%)    PHYSICAL EXAM:  General: NAD, AAOx3, calm and cooperative  HEENT: NCAT, KIAN, EOMI, Trachea ML, Neck supple - no LAD. No supraclavicular LAD  Cardiac: RRR, no Murmurs  Respiratory: CTAB, normal respiratory effort, breath sounds equal BL, no wheeze, rhonchi or crackles. Left pigtal without crepitus at the site. No air leak. On 0.5L of O2.   Abdomen: Soft, non tender.   Musculoskeletal:  compartments soft. Left great toe without swelling or erythema  Neuro: no focal deficits, A&O x 3  Vascular: Extremities well perfused  Skin: Warm/dry, normal color, no jaundice    MEDICATIONS  (STANDING):  albuterol/ipratropium for Nebulization 3 milliLiter(s) Nebulizer every 6 hours  budesonide 160 MICROgram(s)/formoterol 4.5 MICROgram(s) Inhaler 2 Puff(s) Inhalation two times a day  enoxaparin Injectable 40 milliGRAM(s) SubCutaneous every 24 hours  influenza  Vaccine (HIGH DOSE) 0.7 milliLiter(s) IntraMuscular once  methylPREDNISolone sodium succinate Injectable 60 milliGRAM(s) IV Push every 24 hours  tiotropium 2.5 MICROgram(s) Inhaler 2 Puff(s) Inhalation daily    LAB/STUDIES:             14.8   9.43  )-----------( 252      ( 17 Feb 2023 19:18 )             46.0     02-18    145  |  107  |  16  ----------------------------<  70  5.8<H>   |  29  |  0.9    Ca    9.3      18 Feb 2023 09:31  Phos  3.0     02-18  Mg     2.0     02-18    TPro  6.5  /  Alb  3.7  /  TBili  0.3  /  DBili  x   /  AST  17  /  ALT  14  /  AlkPhos  99  02-18  PT/INR - ( 17 Feb 2023 19:18 )   PT: 11.60 sec;   INR: 1.02 ratio    PTT - ( 17 Feb 2023 19:18 )  PTT:33.1 sec  LIVER FUNCTIONS - ( 18 Feb 2023 09:31 )  Alb: 3.7 g/dL / Pro: 6.5 g/dL / ALK PHOS: 99 U/L / ALT: 14 U/L / AST: 17 U/L / GGT: x           CARDIAC MARKERS ( 17 Feb 2023 19:18 )  x     / <0.01 ng/mL / x     / x     / x          IMAGING:  < from: Xray Chest 1 View-PORTABLE IMMEDIATE (02.17.23 @ 20:34) >  Findings:  Support devices: None.  Cardiac/mediastinum/hilum: Heart size within normal limits, thoracic aortic calcification.  Lung parenchyma/Pleura: Approximate 50% left pneumothorax, no tension component. Left pleural effusion  Skeleton/soft tissues: Thoracic spine degenerative changes  Impression:Left pneumothorax. Left pleural effusion.    < from: Xray Chest 1 View-PORTABLE IMMEDIATE (Xray Chest 1 View-PORTABLE IMMEDIATE .) (02.17.23 @ 21:50) >  Impression:Left upper lobe nodule Left focal atelectasis. Left pneumothorax,   resolved.    < from: CT Angio Chest PE Protocol w/ IV Cont (02.17.23 @ 21:55) >  IMPRESSION:  No evidence of pulmonary embolism.  The left-sided pigtail catheter enters into the lower pleural space   laterally and extends anteriorly. There is a smallresidual pneumothorax (few percent).  A 1 cm stellate nodule is noted in the anterior aspect of the left upper lobe.  Mediastinal lymphadenopathy is noted with a 2.7 x 2.8 cm lymph node in   the aortic-pulmonary window.      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 He was a  during 911.   In September 2022, he developed COVID infection that caused an ongoing cough and fatigue for 4-6 weeks, never requiring hospitalization or ABX. In October 2022, he developed sudden onset voice hoarseness. 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   He underwent Bx of the right lung nodule that was positive for CA, he does not know what type. His surgeon through [facility] (which was referred to him based on the 911  program he's a part of) told him the right lung CA was resectable, but he needed to know what is in the left lung first. Therefore on 2/13, the patient underwent IR guided left lung Bx. Less than one hour after procedure, he developed acute SOB. CXR revealed PTX, left chest tube placed, and he immediately felt better. The chest tube was in for 24 hours, follow up CXR showed resolution of PTX, so the CT was removed and he was discharged on 2/14. [place] well at that time.   He had an acute left great toe gouty flare on 2/16 requiring urgent care visit and oral prednisone. This has now resolved.     Now, he returns on 2/17 with acute onset SOB that felt the same as the last PTX. No chest pain or worsening cough. No fevers/chills. He has a pulse ox at home, and it was in the upper 80s. He lives with his daughter on [place], so he came here.     Upon arrival, was noted to have significant left PTX, and left pigtail catheter was placed by the ED. Initially he was on 3L, but currently feels great and is on 0.5L of O2 via NC.   Today, he has no complaints. No CP, SOB, wheezing, or chest tightness. Chronic cough that is unchanged.     Vital Signs Last 24 Hrs  T(C): 36.1 (18 Feb 2023 01:52), Max: 36.1 (18 Feb 2023 00:14)  T(F): 97 (18 Feb 2023 01:52), Max: 97 (18 Feb 2023 00:14)  HR: 78 (18 Feb 2023 01:52) (78 - 104)  BP: 181/89 (18 Feb 2023 01:52) (155/89 - 181/89)  BP(mean): --  RR: 19 (18 Feb 2023 01:52) (19 - 22)  SpO2: 96% (18 Feb 2023 01:52) (86% - 97%)    Parameters below as of 18 Feb 2023 00:14  Patient On (Oxygen Delivery Method): nasal cannula  O2 Flow (L/min): 2   (18 Feb 2023 02:22)    PAST MEDICAL & SURGICAL HISTORY:  COPD, mild  H/O carpal tunnel repair  H/O basal cell carcinoma excision  H/O melanoma excision  After cataract, bilateral  Newly diagnosed right lung CA    Home Medications:  budesonide-formoterol 160 mcg-4.5 mcg/inh inhalation aerosol: 2 puff(s) inhaled 2 times a day (18 Feb 2023 02:54)  Colace 50 mg oral capsule: 1 cap(s) orally 2 times a day (18 Feb 2023 02:54)  Symbicort 80 mcg-4.5 mcg/inh inhalation aerosol: 2 puff(s) inhaled 2 times a day (18 Feb 2023 02:54)  Tylenol 325 mg oral tablet: 2 tab(s) orally every 4 hours, As Needed (18 Feb 2023 02:54)  Vitamin D2 50 mcg (2000 intl units) oral capsule: 1 cap(s) orally once a day (18 Feb 2023 02:54)    VITALS:  T(F): 98.5 (02-18-23 @ 13:20), Max: 98.5 (02-18-23 @ 13:20)  HR: 78 (02-18-23 @ 13:20) (63 - 104)  BP: 127/65 (02-18-23 @ 13:20) (127/65 - 181/89)  RR: 19 (02-18-23 @ 05:10) (19 - 22)  SpO2: 96% (02-18-23 @ 13:20) (86% - 97%)    PHYSICAL EXAM:  General: NAD, AAOx3, calm and cooperative  HEENT: NCAT, KIAN, EOMI, Trachea ML, Neck supple - no LAD. No supraclavicular LAD  Cardiac: RRR, no Murmurs  Respiratory: CTAB, normal respiratory effort, breath sounds equal BL, no wheeze, rhonchi or crackles. Left pigtal without crepitus at the site. No air leak. On 0.5L of O2.   Abdomen: Soft, non tender.   Musculoskeletal:  compartments soft. Left great toe without swelling or erythema  Neuro: no focal deficits, A&O x 3  Vascular: Extremities well perfused  Skin: Warm/dry, normal color, no jaundice    MEDICATIONS  (STANDING):  albuterol/ipratropium for Nebulization 3 milliLiter(s) Nebulizer every 6 hours  budesonide 160 MICROgram(s)/formoterol 4.5 MICROgram(s) Inhaler 2 Puff(s) Inhalation two times a day  enoxaparin Injectable 40 milliGRAM(s) SubCutaneous every 24 hours  influenza  Vaccine (HIGH DOSE) 0.7 milliLiter(s) IntraMuscular once  methylPREDNISolone sodium succinate Injectable 60 milliGRAM(s) IV Push every 24 hours  tiotropium 2.5 MICROgram(s) Inhaler 2 Puff(s) Inhalation daily    LAB/STUDIES:             14.8   9.43  )-----------( 252      ( 17 Feb 2023 19:18 )             46.0     02-18    145  |  107  |  16  ----------------------------<  70  5.8<H>   |  29  |  0.9    Ca    9.3      18 Feb 2023 09:31  Phos  3.0     02-18  Mg     2.0     02-18    TPro  6.5  /  Alb  3.7  /  TBili  0.3  /  DBili  x   /  AST  17  /  ALT  14  /  AlkPhos  99  02-18  PT/INR - ( 17 Feb 2023 19:18 )   PT: 11.60 sec;   INR: 1.02 ratio    PTT - ( 17 Feb 2023 19:18 )  PTT:33.1 sec  LIVER FUNCTIONS - ( 18 Feb 2023 09:31 )  Alb: 3.7 g/dL / Pro: 6.5 g/dL / ALK PHOS: 99 U/L / ALT: 14 U/L / AST: 17 U/L / GGT: x           CARDIAC MARKERS ( 17 Feb 2023 19:18 )  x     / <0.01 ng/mL / x     / x     / x          IMAGING:  < from: Xray Chest 1 View-PORTABLE IMMEDIATE (02.17.23 @ 20:34) >  Findings:  Support devices: None.  Cardiac/mediastinum/hilum: Heart size within normal limits, thoracic aortic calcification.  Lung parenchyma/Pleura: Approximate 50% left pneumothorax, no tension component. Left pleural effusion  Skeleton/soft tissues: Thoracic spine degenerative changes  Impression:Left pneumothorax. Left pleural effusion.    < from: Xray Chest 1 View-PORTABLE IMMEDIATE (Xray Chest 1 View-PORTABLE IMMEDIATE .) (02.17.23 @ 21:50) >  Impression:Left upper lobe nodule Left focal atelectasis. Left pneumothorax,   resolved.    < from: CT Angio Chest PE Protocol w/ IV Cont (02.17.23 @ 21:55) >  IMPRESSION:  No evidence of pulmonary embolism.  The left-sided pigtail catheter enters into the lower pleural space   laterally and extends anteriorly. There is a smallresidual pneumothorax (few percent).  A 1 cm stellate nodule is noted in the anterior aspect of the left upper lobe.  Mediastinal lymphadenopathy is noted with a 2.7 x 2.8 cm lymph node in   the aortic-pulmonary window.      ACCESS DEVICES:  [X ] Peripheral IV  Left pigtail catheter

## 2023-02-18 NOTE — CONSULT NOTE ADULT - SUBJECTIVE AND OBJECTIVE BOX
HPI:  78 yo M with PMH of COPD not on home O2, Lung CA recently diagnosed s/p biopsy ([facility]) on 2/13 complicated by  pneumothorax s/p chest tube taken out after 1 day presenting for SOB. Patient mentions that dyspnea started on day of day of presentation and got acutely worse today. Denies any sudden movements prior symptoms, no lifting of heavy weights. Patient notes his sat was in high 80s today (normal 94-95% on RA). Endorses non-productive cough for the past few days. Also endorses LLE edema. Patient denies fever, cough, congestion, travel, sick contacts, hormone use, NVD, dysuria.    Vital Signs Last 24 Hrs  T(C): 36.1 (18 Feb 2023 01:52), Max: 36.1 (18 Feb 2023 00:14)  T(F): 97 (18 Feb 2023 01:52), Max: 97 (18 Feb 2023 00:14)  HR: 78 (18 Feb 2023 01:52) (78 - 104)  BP: 181/89 (18 Feb 2023 01:52) (155/89 - 181/89)  BP(mean): --  RR: 19 (18 Feb 2023 01:52) (19 - 22)  SpO2: 96% (18 Feb 2023 01:52) (86% - 97%)    Parameters below as of 18 Feb 2023 00:14  Patient On (Oxygen Delivery Method): nasal cannula  O2 Flow (L/min): 2   (18 Feb 2023 02:22)         I reviewed the radiology tests and hospital record including the ED chart.    I reviewed the other consultants comments that are available in the chart.    CC/ HPI Patient is a 77y old  Male who presents with a chief complaint of Pneumothorax (18 Feb 2023 02:22)      Pneumothorax    Pneumothorax, unspecified    No pertinent family history (Father, Mother)    MEWS Score    COPD, mild    Pneumothorax    No significant past surgical history    H/O carpal tunnel repair    H/O basal cell carcinoma excision    H/O melanoma excision    After cataract, bilateral    POST OP COMP / BLOOD OXYGEN LEVEL LOW    [email or web address]        FAMILY HISTORY:  No pertinent family history (Father, Mother)        No Known Allergies      Soc:  see Hpi.    Home prescriptions  budesonide-formoterol 160 mcg-4.5 mcg/inh inhalation aerosol: 2 puff(s) inhaled 2 times a day  Colace 50 mg oral capsule: 1 cap(s) orally 2 times a day  Symbicort 80 mcg-4.5 mcg/inh inhalation aerosol: 2 puff(s) inhaled 2 times a day  Tylenol 325 mg oral tablet: 2 tab(s) orally every 4 hours, As Needed  Vitamin D2 50 mcg (2000 intl units) oral capsule: 1 cap(s) orally once a day      MEDICATIONS  (STANDING):  albuterol/ipratropium for Nebulization 3 milliLiter(s) Nebulizer every 6 hours  budesonide 160 MICROgram(s)/formoterol 4.5 MICROgram(s) Inhaler 2 Puff(s) Inhalation two times a day  enoxaparin Injectable 40 milliGRAM(s) SubCutaneous every 24 hours  influenza  Vaccine (HIGH DOSE) 0.7 milliLiter(s) IntraMuscular once  methylPREDNISolone sodium succinate Injectable 60 milliGRAM(s) IV Push every 24 hours  tiotropium 2.5 MICROgram(s) Inhaler 2 Puff(s) Inhalation daily    MEDICATIONS  (PRN):          T(C): 36.1 (02-18-23 @ 05:10), Max: 36.1 (02-18-23 @ 00:14)  HR: 63 (02-18-23 @ 05:10) (63 - 104)  BP: 144/71 (02-18-23 @ 05:10) (144/71 - 181/89)  RR: 19 (02-18-23 @ 05:10) (19 - 22)  SpO2: 97% (02-18-23 @ 05:10) (86% - 97%)  ICU Vital Signs Last 24 Hrs  T(C): 36.1 (18 Feb 2023 05:10), Max: 36.1 (18 Feb 2023 00:14)  T(F): 97 (18 Feb 2023 05:10), Max: 97 (18 Feb 2023 00:14)  HR: 63 (18 Feb 2023 05:10) (63 - 104)  BP: 144/71 (18 Feb 2023 05:10) (144/71 - 181/89)  RR: 19 (18 Feb 2023 05:10) (19 - 22)  SpO2: 97% (18 Feb 2023 05:10) (86% - 97%)    O2 Parameters below as of 18 Feb 2023 05:10  Patient On (Oxygen Delivery Method): nasal cannula            I&O's Summary      I&O's Detail      Drug Dosing Weight  Height (cm): 177.8 (10 Dec 2021 10:05)  Weight (kg): 72.6 (17 Feb 2023 18:46)  BMI (kg/m2): 23 (17 Feb 2023 18:46)  BSA (m2): 1.9 (17 Feb 2023 18:46)    LABS:                          14.8   9.43  )-----------( 252      ( 17 Feb 2023 19:18 )             46.0       02-17    141  |  105  |  22<H>  ----------------------------<  130<H>  4.8   |  26  |  0.9    Ca    9.4      17 Feb 2023 19:12  Mg     2.0     02-17    TPro  7.5  /  Alb  4.0  /  TBili  <0.2  /  DBili  x   /  AST  18  /  ALT  15  /  AlkPhos  115  02-17      LIVER FUNCTIONS - ( 17 Feb 2023 19:12 )  Alb: 4.0 g/dL / Pro: 7.5 g/dL / ALK PHOS: 115 U/L / ALT: 15 U/L / AST: 18 U/L / GGT: x             CARDIAC MARKERS ( 17 Feb 2023 19:18 )  x     / <0.01 ng/mL / x     / x     / x                Serum Pro-Brain Natriuretic Peptide: 721 pg/mL (02-17-23 @ 19:18)              COVID-19 PCR: NotDetec (17 Feb 2023 21:40)                          RADIOLOGY:  I have personally reviewed all chest and other pertinent radiology films.         REVIEW OF SYSTEMS:   see Hpi.    PHYSICAL EXAM:       · ENMT:   Airway patent,   Nasal mucosa clear.  Mouth with normal mucosa.   No thrush    · EYES:   Clear bilaterally,   pupils equal,   round and reactive to light.    · CARDIAC:   Normal rate,   regular rhythm.    Heart sounds S1, S2.   no thrills or bruits on palpitation  normal  cardiac impulse  No murmurs, no rubs or gallops on auscultation  no edema        CAROTID:   normal systolic impulse  no bruits    · RESPIRATORY:   decreased BS   normal chest expansion  not tachypneic,  no retractions or use of accessory muscles  palpation of chest is normal with no fremitus  percussion of chest demonstrates no hyperresonance or dullness    · GASTROINTESTINAL:  Abdomen soft,   non-tender,   no guarding,   + BS  liver spleen not palpable      · MUSCULOSKELETAL:   range of motion is not limited,  no clubbing, cyanosis  no petechiae      · SKIN:   Skin normal color for race,   warm,   dry and intact.       · HEME LYMPH:   no splenomegaly.  No cervical  lymphadenopathy.  no inguinal lymphadenopathy

## 2023-02-18 NOTE — H&P ADULT - NSICDXPASTSURGICALHX_GEN_ALL_CORE_FT
PAST SURGICAL HISTORY:  After cataract, bilateral     H/O basal cell carcinoma excision     H/O carpal tunnel repair     H/O melanoma excision

## 2023-02-18 NOTE — H&P ADULT - NSHPPHYSICALEXAM_GEN_ALL_CORE
T(C): 36.1 (02-18-23 @ 01:52), Max: 36.1 (02-18-23 @ 00:14)  HR: 78 (02-18-23 @ 01:52) (78 - 104)  BP: 181/89 (02-18-23 @ 01:52) (155/89 - 181/89)  RR: 19 (02-18-23 @ 01:52) (19 - 22)  SpO2: 96% (02-18-23 @ 01:52) (86% - 97%)    CONSTITUTIONAL: Well groomed, no apparent distress  RESP: No respiratory distress, no use of accessory muscles; CTA b/l, no WRR  CV: RRR, +S1S2, no MRG; no JVD; no peripheral edema  GI: Soft, NT, ND, no rebound, no guarding; no palpable masses; no hepatosplenomegaly; no hernia palpated  PSYCH: Appropriate insight/judgment; A+O x 3, mood and affect appropriate, recent/remote memory intact T(C): 36.1 (02-18-23 @ 01:52), Max: 36.1 (02-18-23 @ 00:14)  HR: 78 (02-18-23 @ 01:52) (78 - 104)  BP: 181/89 (02-18-23 @ 01:52) (155/89 - 181/89)  RR: 19 (02-18-23 @ 01:52) (19 - 22)  SpO2: 96% (02-18-23 @ 01:52) (86% - 97%)    CONSTITUTIONAL: Well groomed, no apparent distress. 2 L NC   RESP: No respiratory distress, no use of accessory muscles; CTA b/l, no WRR  CV: RRR, +S1S2, no MRG; no JVD; no peripheral edema  GI: Soft, NT, ND, no rebound, no guarding.  PSYCH: Appropriate insight/judgment; A+O x 3

## 2023-02-18 NOTE — PATIENT PROFILE ADULT - FALL HARM RISK - HARM RISK INTERVENTIONS

## 2023-02-18 NOTE — H&P ADULT - NSHPLABSRESULTS_GEN_ALL_CORE
14.8   9.43  )-----------( 252      ( 17 Feb 2023 19:18 )             46.0     02-17    141  |  105  |  22<H>  ----------------------------<  130<H>  4.8   |  26  |  0.9    Ca    9.4      17 Feb 2023 19:12  Mg     2.0     02-17    TPro  7.5  /  Alb  4.0  /  TBili  <0.2  /  DBili  x   /  AST  18  /  ALT  15  /  AlkPhos  115  02-17    < from: CT Angio Chest PE Protocol w/ IV Cont (02.17.23 @ 21:55) >    OSSEOUS STRUCTURES: Degenerative changes of the spine are noted.      IMPRESSION:    No evidence of pulmonary embolism.    The left-sided pigtail catheter enters into the lower pleural space   laterally and extends anteriorly. There is a smallresidual pneumothorax   (few percent).    A 1 cm stellate nodule is noted in the anterior aspect of the left upper   lobe.    Mediastinal lymphadenopathy is noted with a 2.7 x 2.8 cm lymph node in   the aortic-pulmonary window.    < end of copied text >

## 2023-02-19 LAB
ANION GAP SERPL CALC-SCNC: 9 MMOL/L — SIGNIFICANT CHANGE UP (ref 7–14)
BUN SERPL-MCNC: 21 MG/DL — HIGH (ref 10–20)
CALCIUM SERPL-MCNC: 8.9 MG/DL — SIGNIFICANT CHANGE UP (ref 8.4–10.5)
CHLORIDE SERPL-SCNC: 108 MMOL/L — SIGNIFICANT CHANGE UP (ref 98–110)
CO2 SERPL-SCNC: 26 MMOL/L — SIGNIFICANT CHANGE UP (ref 17–32)
CREAT SERPL-MCNC: 0.9 MG/DL — SIGNIFICANT CHANGE UP (ref 0.7–1.5)
CULTURE RESULTS: NO GROWTH — SIGNIFICANT CHANGE UP
EGFR: 87 ML/MIN/1.73M2 — SIGNIFICANT CHANGE UP
GLUCOSE SERPL-MCNC: 77 MG/DL — SIGNIFICANT CHANGE UP (ref 70–99)
HCT VFR BLD CALC: 42.3 % — SIGNIFICANT CHANGE UP (ref 42–52)
HGB BLD-MCNC: 13.6 G/DL — LOW (ref 14–18)
MCHC RBC-ENTMCNC: 30.5 PG — SIGNIFICANT CHANGE UP (ref 27–31)
MCHC RBC-ENTMCNC: 32.2 G/DL — SIGNIFICANT CHANGE UP (ref 32–37)
MCV RBC AUTO: 94.8 FL — HIGH (ref 80–94)
NRBC # BLD: 0 /100 WBCS — SIGNIFICANT CHANGE UP (ref 0–0)
PLATELET # BLD AUTO: 226 K/UL — SIGNIFICANT CHANGE UP (ref 130–400)
POTASSIUM SERPL-MCNC: 4.5 MMOL/L — SIGNIFICANT CHANGE UP (ref 3.5–5)
POTASSIUM SERPL-SCNC: 4.5 MMOL/L — SIGNIFICANT CHANGE UP (ref 3.5–5)
RBC # BLD: 4.46 M/UL — LOW (ref 4.7–6.1)
RBC # FLD: 13.7 % — SIGNIFICANT CHANGE UP (ref 11.5–14.5)
SODIUM SERPL-SCNC: 143 MMOL/L — SIGNIFICANT CHANGE UP (ref 135–146)
SPECIMEN SOURCE: SIGNIFICANT CHANGE UP
WBC # BLD: 9.72 K/UL — SIGNIFICANT CHANGE UP (ref 4.8–10.8)
WBC # FLD AUTO: 9.72 K/UL — SIGNIFICANT CHANGE UP (ref 4.8–10.8)

## 2023-02-19 PROCEDURE — 99232 SBSQ HOSP IP/OBS MODERATE 35: CPT

## 2023-02-19 PROCEDURE — 71045 X-RAY EXAM CHEST 1 VIEW: CPT | Mod: 26,76

## 2023-02-19 RX ADMIN — ENOXAPARIN SODIUM 40 MILLIGRAM(S): 100 INJECTION SUBCUTANEOUS at 05:13

## 2023-02-19 RX ADMIN — Medication 3 MILLILITER(S): at 08:16

## 2023-02-19 RX ADMIN — Medication 60 MILLIGRAM(S): at 11:26

## 2023-02-19 RX ADMIN — Medication 3 MILLILITER(S): at 20:35

## 2023-02-20 PROCEDURE — 71045 X-RAY EXAM CHEST 1 VIEW: CPT | Mod: 26

## 2023-02-20 PROCEDURE — 99232 SBSQ HOSP IP/OBS MODERATE 35: CPT

## 2023-02-20 PROCEDURE — 71045 X-RAY EXAM CHEST 1 VIEW: CPT | Mod: 26,77

## 2023-02-20 RX ADMIN — BUDESONIDE AND FORMOTEROL FUMARATE DIHYDRATE 2 PUFF(S): 160; 4.5 AEROSOL RESPIRATORY (INHALATION) at 09:25

## 2023-02-20 RX ADMIN — BUDESONIDE AND FORMOTEROL FUMARATE DIHYDRATE 2 PUFF(S): 160; 4.5 AEROSOL RESPIRATORY (INHALATION) at 21:15

## 2023-02-20 RX ADMIN — Medication 3 MILLILITER(S): at 20:23

## 2023-02-20 RX ADMIN — ENOXAPARIN SODIUM 40 MILLIGRAM(S): 100 INJECTION SUBCUTANEOUS at 05:14

## 2023-02-20 RX ADMIN — Medication 3 MILLILITER(S): at 09:25

## 2023-02-20 RX ADMIN — Medication 3 MILLILITER(S): at 16:00

## 2023-02-20 NOTE — DISCHARGE NOTE PROVIDER - NSDCMRMEDTOKEN_GEN_ALL_CORE_FT
budesonide-formoterol 160 mcg-4.5 mcg/inh inhalation aerosol: 2 puff(s) inhaled 2 times a day  Colace 50 mg oral capsule: 1 cap(s) orally 2 times a day  Symbicort 80 mcg-4.5 mcg/inh inhalation aerosol: 2 puff(s) inhaled 2 times a day  Tylenol 325 mg oral tablet: 2 tab(s) orally every 4 hours, As Needed  Vitamin D2 50 mcg (2000 intl units) oral capsule: 1 cap(s) orally once a day

## 2023-02-20 NOTE — PROGRESS NOTE ADULT - ASSESSMENT
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 The patient underwent left IR-guided lung Bx on 2/13, and post-procedurally developed left PTX requiring placement of chest tube for 24 hours. Chest tube was removed on 2/14 with radiographic resolution of PTX. He now presents with recurrent 50% left PTX s/p left pigtail catheter by ED on 2/17.     #Recurrent 50% left PTX, s/p left pigtail catheter on 2/17. Follow up CT imaging shows near resolution with few percent PTX. Patient is now asymptomatic, and is not hypoxic.   -S/p left IR-guided lung Bx on 2/13 at [facility], post-procedurally developed left PTX requiring chest tube placement. Removed on 2/14.     #COPD - not on home O2. On Sinacort and albuterol PRN     #Newly diagnosed right-sided lung CA. Type unknown. Receives all care through [facility] through 911  program.    #Recent Hx left great toe gouty flare, s/p steroids. Now resolved.     #HLD    PLAN:  -F/U AM CXR  -If no new PTX on today's CXR, will place pigtail to water seal and repeat CXR four hours after  -Monitor O2 needs, wean O2 as needed    [id number]  
78 yo M with PMH of COPD not on home O2, Lung CA recently diagnosed s/p biopsy ([facility]) on 2/13 complicated by  pneumothorax s/p chest tube taken out after 1 day presenting for SOB.    # Iatrogenic Left pneumothorax   # Lung Ca  # Recent Lung Biopsy  # COPD on home O2   # Current Smoker  - Patient had recent left lung biopsy at [place]   - Left pneumothorax post procedure s/p Chest tube > Removed after 24hrs   - Recurrent Left pneumothorax s/p L chest tube 02/18  - CTA chest: Bilateral [address]  - C/S Pulm: Assessment of this recurrent pneumothorax. Need for further intervention   - AM CXR after chest tube removal per CTS today    Misc  - DVT ppx lovenox 40mg qd  - diet: regular   - activity: IAT, PT/OT  - code status: full  - dispo: dc 24-48h

## 2023-02-20 NOTE — DISCHARGE NOTE PROVIDER - NSDCCPCAREPLAN_GEN_ALL_CORE_FT
PRINCIPAL DISCHARGE DIAGNOSIS  Diagnosis: Pneumothorax  Assessment and Plan of Treatment: A collapsed lung (pneumothorax) is a buildup of air in the space between the lung and the chest wall. As more air builds up in this space, the pressure against the lung makes the lung collapse. This causes shortness of breath and chest pain because your lung cannot fully expand.  A collapsed lung is usually caused by an injury to the chest, but it may also occur suddenly without an injury because of a lung illness, such as emphysema or lung fibrosis. Your lung may collapse after lung surgery or another medical procedure. Sometimes it happens for no known reason in an otherwise healthy person (spontaneous pneumothorax).  Treatment depends on the cause of the collapse. It may heal with rest, although your doctor will want to keep track of your progress. It can take several days for the lung to expand again. You were treated by draining the air with a needle or tube inserted into the space between your chest and the collapsed lung. It was then removed. Follow up with your Primary Care Doctor for further care.

## 2023-02-20 NOTE — DISCHARGE NOTE PROVIDER - CARE PROVIDER_API CALL
Cathy Arce)  Internal Medicine  [address] [place] [address], [address]st Floor  [place], [place] [id number]  Phone: [phone]  Fax: [phone]  Follow Up Time:

## 2023-02-20 NOTE — PROGRESS NOTE ADULT - ATTENDING COMMENTS
Patient is a 78 y/o Male with PMH of COPD not on home O2, Lung CA recently diagnosed s/p biopsy ([facility]) on 2/13 complicated by  pneumothorax s/p chest tube taken out after 1 day presenting for SOB.    #  Left pneumothorax -recurrent post recent lung biopsy   # Lung Ca  # COPD  # Current Smoker  - Patient had recent left lung biopsy at [facility]   - Left pneumothorax post procedure s/p Chest tube > Removed after 24hrs   - s/p L chest tube 02/18  - CTA chest: Bilateral Hilar LN  - 2/20- CXR- no significant pneumothorax.   - further Chest tube management as per CT Surgical team- 2/20- planning to remove it today, with CXR to be repeated in 4 hours.    -if stable will plan for d/c home today     #DVT - lovenox sq     Total time spent to complete patient's bedside assessment, review medical chart, discuss medical plan of care with covering medical team was more than 35 minutes with >50% of time spent face to face with patient, discussion with patient/family and/or coordination of care

## 2023-02-20 NOTE — DISCHARGE NOTE PROVIDER - HOSPITAL COURSE
78 yo M with PMH of COPD not on home O2, Lung CA recently diagnosed s/p biopsy ([facility]) on 2/13 complicated by  pneumothorax s/p chest tube taken out after 1 day presenting for SOB. Patient mentions that dyspnea started on day of day of presentation and got acutely worse today. Denies any sudden movements prior symptoms, no lifting of heavy weights. Patient notes his sat was in high 80s today (normal 94-95% on RA). Endorses non-productive cough for the past few days. Also endorses LLE edema. Patient denies fever, cough, congestion, travel, sick contacts, hormone use, NVD, dysuria.    #  Left pneumothorax -recurrent post recent lung biopsy   # Lung Ca  # COPD  # Current Smoker  - Patient had recent left lung biopsy at [facility]   - CTA chest: Bilateral Hilar LN  - Left pneumothorax post procedure s/p Chest tube 2/18,  Removed 2/20 afternoon  - repeat CXR stable.     76 yo M with PMH of COPD not on home O2, Lung CA recently diagnosed s/p biopsy ([facility]) on 2/13 complicated by  pneumothorax s/p chest tube taken out after 1 day presenting for SOB. Patient mentions that dyspnea started on day of day of presentation and got acutely worse today. Denies any sudden movements prior symptoms, no lifting of heavy weights. Patient notes his sat was in high 80s today (normal 94-95% on RA). Endorses non-productive cough for the past few days. Also endorses LLE edema. Patient denies fever, cough, congestion, travel, sick contacts, hormone use, NVD, dysuria.    #  Left pneumothorax -recurrent post recent lung biopsy   # Lung Ca  # COPD  # Current Smoker  - Patient had recent left lung biopsy at [facility]   - CTA chest: Bilateral Hilar LN  - Left pneumothorax post procedure s/p Chest tube 2/18,  Removed 2/20 afternoon  - repeat CXR after chest tube removal normal, stable

## 2023-02-20 NOTE — CHART NOTE - NSCHARTNOTEFT_GEN_A_CORE
Pigtail removed at bedside, pt tolerated procedure well. Post removal xray without any pneumothorax. No further intervention by CT surgery, will s/o. Please recall as needed.     spectra [phone]

## 2023-02-21 ENCOUNTER — TRANSCRIPTION ENCOUNTER (OUTPATIENT)
Age: 78
End: 2023-02-21

## 2023-02-21 VITALS
TEMPERATURE: 97 F | OXYGEN SATURATION: 100 % | SYSTOLIC BLOOD PRESSURE: 139 MMHG | RESPIRATION RATE: 18 BRPM | DIASTOLIC BLOOD PRESSURE: 75 MMHG | HEART RATE: 82 BPM

## 2023-02-21 PROCEDURE — 99239 HOSP IP/OBS DSCHRG MGMT >30: CPT

## 2023-02-21 PROCEDURE — 71045 X-RAY EXAM CHEST 1 VIEW: CPT | Mod: 26

## 2023-02-21 NOTE — DISCHARGE NOTE NURSING/CASE MANAGEMENT/SOCIAL WORK - PATIENT PORTAL LINK FT
You can access the FollowMyHealth Patient Portal offered by [facility] by registering at the following website: [email or web address]. By joining [employer]’s FollowMyHealth portal, you will also be able to view your health information using other applications (apps) compatible with our system.

## 2023-02-21 NOTE — PROGRESS NOTE ADULT - SUBJECTIVE AND OBJECTIVE BOX
Internal Medicine Progress Note    Location: [address] (Back) 026 A  Patient Name: JAMEL WATERS  MRN: [id number]    Patient is a 77y old  Male who presents with a chief complaint of Pneumothorax (20 Feb 2023 00:29)      Subjective  NAEON. This morning, patient was seen and examined at bedside. Patient reports doing well, denies SOB, states he wants the chest tube out     Objective  Vital Signs Last 24 Hrs  T(C): 35.9 (20 Feb 2023 05:24), Max: 36.3 (19 Feb 2023 14:45)  T(F): 96.6 (20 Feb 2023 05:24), Max: 97.4 (19 Feb 2023 14:45)  HR: 69 (20 Feb 2023 05:24) (69 - 94)  BP: 146/70 (20 Feb 2023 05:24) (131/63 - 146/74)  BP(mean): --  RR: 18 (20 Feb 2023 05:24) (18 - 18)  SpO2: 92% (19 Feb 2023 20:30) (92% - 92%)    Parameters below as of 19 Feb 2023 20:30  Patient On (Oxygen Delivery Method): nasal cannula      Physical Exam  General: nontoxic, NAD  ENT: moist mucous membranes  Neck: supple  Chest/lung: CTAB   Heart: RRR, +S1 S2  Abdomen: soft, nontender, nondistended  Extremities: warm and well perfused, no LE pitting edema  Neuro: no gross focal deficits  Psych: AAOx3  Skin: no rashes or lesions on exposed skin    Labs  CBC:                       13.6   9.72  )-----------( 226      ( 19 Feb 2023 07:09 )             42.3     BMP: 02-19    143  |  108  |  21<H>  ----------------------------<  77  4.5   |  26  |  0.9    Ca    8.9      19 Feb 2023 07:09      Coag:   ABG:   Cardiac markers:       Micro:      Culture - Urine (collected 17 Feb 2023 22:12)  Source: Clean Catch Clean Catch (Midstream)  Final Report (19 Feb 2023 15:45):    No growth    Culture - Blood (collected 17 Feb 2023 19:18)  Source: .Blood Blood-Peripheral  Preliminary Report (18 Feb 2023 23:01):    No growth to date.    Culture - Blood (collected 17 Feb 2023 19:16)  Source: .Blood Blood-Peripheral  Preliminary Report (18 Feb 2023 23:01):    No growth to date.        Imaging:    Standing Medications  MEDICATIONS  (STANDING):  albuterol/ipratropium for Nebulization 3 milliLiter(s) Nebulizer every 6 hours  budesonide 160 MICROgram(s)/formoterol 4.5 MICROgram(s) Inhaler 2 Puff(s) Inhalation two times a day  enoxaparin Injectable 40 milliGRAM(s) SubCutaneous every 24 hours  influenza  Vaccine (HIGH DOSE) 0.7 milliLiter(s) IntraMuscular once  tiotropium 2.5 MICROgram(s) Inhaler 2 Puff(s) Inhalation daily    PRN Medications  MEDICATIONS  (PRN):  
THORACIC SURGERY PROGRESS NOTE    Patient: JAMEL WATERS , 77y ([birth year])Male   MRN: [id number]  Location: [address] (Back) 026 A  Visit: 02-17-23 Inpatient  Date: 02-20-23 @ 00:30    Hospital Day #: 4    Procedure/Dx/Injuries: Left pneumothorax  S/P left pigtail 2/17    Events of past 24 hours: Chest tube put to water seal 2/19 AM. CXR demonstrated no pneumothorax. Chest tube left to water seal, AM CXR pending. Patient with no complaints. Denies SOB. Hemodynamically stable.     PAST MEDICAL & SURGICAL HISTORY:  COPD, mild      H/O carpal tunnel repair      H/O basal cell carcinoma excision      H/O melanoma excision      After cataract, bilateral          Vitals:   T(F): 97.3 (02-19-23 @ 20:30), Max: 97.4 (02-19-23 @ 14:45)  HR: 94 (02-19-23 @ 20:30)  BP: 146/74 (02-19-23 @ 20:30)  RR: 18 (02-19-23 @ 14:45)  SpO2: 92% (02-19-23 @ 20:30)      Diet, Regular      Fluids:     I & O's:    PHYSICAL EXAM:  General: NAD, AAOx3, calm and cooperative  HEENT: NCAT, Trachea ML  Cardiac: RRR  Respiratory: Normal respiratory effort on RA. Left pigtail without crepitus at the site. Chest tube to WS.   Abdomen: Soft, non tender.   Musculoskeletal: Mobile, compartments soft  Neuro: No focal deficits  Vascular: Extremities well perfused  Skin: Warm/dry, normal color, no jaundice    MEDICATIONS  (STANDING):  albuterol/ipratropium for Nebulization 3 milliLiter(s) Nebulizer every 6 hours  budesonide 160 MICROgram(s)/formoterol 4.5 MICROgram(s) Inhaler 2 Puff(s) Inhalation two times a day  enoxaparin Injectable 40 milliGRAM(s) SubCutaneous every 24 hours  influenza  Vaccine (HIGH DOSE) 0.7 milliLiter(s) IntraMuscular once  tiotropium 2.5 MICROgram(s) Inhaler 2 Puff(s) Inhalation daily    MEDICATIONS  (PRN):      DVT PROPHYLAXIS: enoxaparin Injectable 40 milliGRAM(s) SubCutaneous every 24 hours    GI PROPHYLAXIS:   ANTICOAGULATION:   ANTIBIOTICS:            LAB/STUDIES:  Labs:  CAPILLARY BLOOD GLUCOSE                              13.6   9.72  )-----------( 226      ( 19 Feb 2023 07:09 )             42.3         02-19    143  |  108  |  21<H>  ----------------------------<  77  4.5   |  26  |  0.9      Calcium, Total Serum: 8.9 mg/dL (02-19-23 @ 07:09)      LFTs:             6.5  | 0.3  | 17       ------------------[99      ( 18 Feb 2023 09:31 )  3.7  | x    | 14          Lipase:x      Amylase:x         Blood Gas Venous - Lactate: 2.00 mmol/L (02-17-23 @ 19:10)      Coags:        Serum Pro-Brain Natriuretic Peptide: 721 pg/mL (02-17-23 @ 19:18)          Culture - Urine (collected 17 Feb 2023 22:12)  Source: Clean Catch Clean Catch (Midstream)  Final Report (19 Feb 2023 15:45):    No growth    Culture - Blood (collected 17 Feb 2023 19:18)  Source: .Blood Blood-Peripheral  Preliminary Report (18 Feb 2023 23:01):    No growth to date.    Culture - Blood (collected 17 Feb 2023 19:16)  Source: .Blood Blood-Peripheral  Preliminary Report (18 Feb 2023 23:01):    No growth to date.      IMAGING:  < from: Xray Chest 1 View- PORTABLE-Urgent (Xray Chest 1 View- PORTABLE-Urgent .) (02.19.23 @ 12:38) >  FINDINGS/  IMPRESSION:    Unchanged left chest tube.  No radiographically evident pneumothorax. No pleural effusion or   consolidation.    Stable cardiomediastinal silhouette.    Unchanged osseous structures.    --- End of Report ---  < end of copied text >    < from: Xray Chest 1 View- PORTABLE-Routine (Xray Chest 1 View- PORTABLE-Routine in AM.) (02.19.23 @ 07:57) >  FINDINGS/  IMPRESSION:    Unchanged left chest tube. No radiographically evident pneumothorax. No   new consolidation or significant pleural effusion on frontal view.    Stable cardiomediastinal silhouette.    Unchanged osseous structures.    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 The patient underwent left IR-guided lung Bx on 2/13, and post-procedurally developed left PTX requiring placement of chest tube for 24 hours. Chest tube was removed on 2/14 with radiographic resolution of PTX. He now presents with recurrent 50% left PTX s/p left pigtail catheter by ED on 2/17.     #Recurrent 50% left PTX, s/p left pigtail catheter on 2/17. Follow up CT imaging shows near resolution with few percent PTX. Patient is now asymptomatic, and is not hypoxic.   -S/p left IR-guided lung Bx on 2/13 at [facility], post-procedurally developed left PTX requiring chest tube placement. Removed on 2/14.     #COPD - not on home O2. On Sinacort and albuterol PRN     #Newly diagnosed right-sided lung CA. Type unknown. Receives all care through [facility] through 911  program.    #Recent Hx left great toe gouty flare, s/p steroids. Now resolved.     #HLD    PLAN:  - AM CXR  - Chest tube to WS  - Possible removal today, 2/20 pending AM CXR  - Monitor respiratory status    [facility] Spectra  [phone]
  JAMEL WATERS  [facility] (Back) 026 A ([facility] (Back))        Patient was evaluated and examined  by bedside, no active complains       REVIEW OF SYSTEMS:  please see pertinent positives mentioned above, all other 12 ROS negative      T(C): , Max: 36.9 (02-18-23 @ 20:30)  HR: 62 (02-19-23 @ 05:12)  BP: 138/70 (02-19-23 @ 05:12)  RR: 18 (02-19-23 @ 05:12)  SpO2: 98% (02-18-23 @ 20:30)  CAPILLARY BLOOD GLUCOSE          PHYSICAL EXAM:  General: NAD, AAOX3, patient is laying comfortably in bed  HEENT: AT, NC, Supple, NO JVD, NO CB  Lungs: decreased breath sounds over left lung area, good breath sounds right lung , no wheezing, no rhonchi  Chest: left chest tube present  CVS: normal S1, S2, RRR, NO M/G/R  Abdomen: soft, bowel sounds present, non-tender, non-distended  Extremities: no edema, no clubbing, no cyanosis, positive peripheral pulses b/l  Neuro: no acute focal neurological deficits  Skin: no rash, no ecchymosis      LAB  CBC  Date: 02-19-23 @ 07:09  Mean cell Vdknkpndth43.5  Mean cell Hemoglobin Conc32.2  Mean cell Volum 94.8  Platelet count-Automate 226  RBC Count 4.46  Red Cell Distrib Width13.7  WBC Count9.72  % Albumin, Urine--  Hematocrit 42.3  Hemoglobin 13.6  CBC  Date: 02-17-23 @ 19:18  Mean cell Tefnpoqldg46.7  Mean cell Hemoglobin Conc32.2  Mean cell Volum 95.4  Platelet count-Automate 252  RBC Count 4.82  Red Cell Distrib Width13.6  WBC Count9.43  % Albumin, Urine--  Hematocrit 46.0  Hemoglobin 14.8    BMP  02-19-23 @ 07:09  Blood Gas Arterial-Calcium,Ionized--  Blood Urea Nitrogen, Serum 21 mg/dL<H> [10 - 20]  Carbon Dioxide, Serum26 mmol/L [17 - 32]  Chloride, Rfoyr293 mmol/L [98 - 110]  Creatinie, Serum0.9 mg/dL [0.7 - 1.5]  Glucose, Serum77 mg/dL [70 - 99]  Potassium, Serum4.5 mmol/L [3.5 - 5.0] [Slighty Hemolyzed use with Caution]  Sodium, Serum 143 mmol/L [135 - 146]  [facility]  02-18-23 @ 18:13  Blood Gas Arterial-Calcium,Ionized--  Blood Urea Nitrogen, Serum 21 mg/dL<H> [10 - 20]  Carbon Dioxide, Serum25 mmol/L [17 - 32]  Chloride, Xzyfe963 mmol/L [98 - 110]  Creatinie, Serum1.1 mg/dL [0.7 - 1.5]  Glucose, Lpasa319 mg/dL<H> [70 - 99]  Potassium, Serum4.5 mmol/L [3.5 - 5.0]  Sodium, Serum 139 mmol/L [135 - 146]  [facility]  02-18-23 @ 09:31  Blood Gas Arterial-Calcium,Ionized--  Blood Urea Nitrogen, Serum 16 mg/dL [10 - 20]  Carbon Dioxide, Serum29 mmol/L [17 - 32]  Chloride, Ldpcu887 mmol/L [98 - 110]  Creatinie, Serum0.9 mg/dL [0.7 - 1.5]  Glucose, Serum70 mg/dL [70 - 99]  Potassium, Serum5.8 mmol/L<H> [3.5 - 5.0]  Sodium, Serum 145 mmol/L [135 - 146]  [facility]  02-17-23 @ 19:12  Blood Gas Arterial-Calcium,Ionized--  Blood Urea Nitrogen, Serum 22 mg/dL<H> [10 - 20]  Carbon Dioxide, Serum26 mmol/L [17 - 32]  Chloride, Jyrii184 mmol/L [98 - 110]  Creatinie, Serum0.9 mg/dL [0.7 - 1.5]  Glucose, Phdbo504 mg/dL<H> [70 - 99]  Potassium, Serum4.8 mmol/L [3.5 - 5.0]  Sodium, Serum 141 mmol/L [135 - 146]        PT/INR - ( 17 Feb 2023 19:18 )   PT: 11.60 sec;   INR: 1.02 ratio         PTT - ( 17 Feb 2023 19:18 )  PTT:33.1 sec      Microbiology:    Culture - Blood (collected 02-17-23 @ 19:18)  Source: .Blood Blood-Peripheral  Preliminary Report (02-18-23 @ 23:01):    No growth to date.    Culture - Blood (collected 02-17-23 @ 19:16)  Source: .Blood Blood-Peripheral  Preliminary Report (02-18-23 @ 23:01):    No growth to date.      Medications:  albuterol/ipratropium for Nebulization 3 milliLiter(s) Nebulizer every 6 hours  budesonide 160 MICROgram(s)/formoterol 4.5 MICROgram(s) Inhaler 2 Puff(s) Inhalation two times a day  enoxaparin Injectable 40 milliGRAM(s) SubCutaneous every 24 hours  influenza  Vaccine (HIGH DOSE) 0.7 milliLiter(s) IntraMuscular once  methylPREDNISolone sodium succinate Injectable 60 milliGRAM(s) IV Push every 24 hours  tiotropium 2.5 MICROgram(s) Inhaler 2 Puff(s) Inhalation daily        Assessment and Plan:  Patient is a 78 y/o Male with PMH of COPD not on home O2, Lung CA recently diagnosed s/p biopsy ([facility]) on 2/13 complicated by  pneumothorax s/p chest tube taken out after 1 day presenting for SOB.    #  Left pneumothorax -recurrent post recent lung biopsy   # Lung Ca  # COPD  # Current Smoker  - Patient had recent left lung biopsy at [facility]   - Left pneumothorax post procedure s/p Chest tube > Removed after 24hrs   - s/p L chest tube 02/18  - CTA chest: Bilateral Hilar LN  - currently on 2 L via nc sat 99%  - further Chest tube management as per CT Surgical team   - Chest Xray in the AM    #DVT - lovenox sq     Total time spent to complete patient's bedside assessment, review medical chart, discuss medical plan of care with covering medical team was more than 35 minutes with >50% of time spent face to face with patient, discussion with patient/family and/or coordination of care    
  JAMEL WATERS  [facility] (Back) 026 A ([facility] (Back))      Patient was evaluated and examined  by bedside, no active complains       REVIEW OF SYSTEMS:  please see pertinent positives mentioned above, all other 12 ROS negative      T(C): , Max: 36.1 (02-20-23 @ 20:32)  HR: 82 (02-21-23 @ 05:06)  BP: 139/75 (02-21-23 @ 05:06)  RR: 18 (02-21-23 @ 05:06)  SpO2: 100% (02-21-23 @ 05:06)  CAPILLARY BLOOD GLUCOSE          PHYSICAL EXAM:  General: NAD, AAOX3, patient is laying comfortably in bed  HEENT: AT, NC, Supple, NO JVD, NO CB  Lungs: improved breath sounds B/L, no wheezing, no rhonchi  CVS: normal S1, S2, RRR, NO M/G/R  Abdomen: soft, bowel sounds present, non-tender, non-distended  Extremities: no edema, no clubbing, no cyanosis, positive peripheral pulses b/l  Neuro: no acute focal neurological deficits  Skin: no rash, no ecchymosis      LAB  CBC  Date: 02-19-23 @ 07:09  Mean cell Acvgaedfkq92.5  Mean cell Hemoglobin Conc32.2  Mean cell Volum 94.8  Platelet count-Automate 226  RBC Count 4.46  Red Cell Distrib Width13.7  WBC Count9.72  % Albumin, Urine--  Hematocrit 42.3  Hemoglobin 13.6  CBC  Date: 02-17-23 @ 19:18  Mean cell Pyrubvbyxj40.7  Mean cell Hemoglobin Conc32.2  Mean cell Volum 95.4  Platelet count-Automate 252  RBC Count 4.82  Red Cell Distrib Width13.6  WBC Count9.43  % Albumin, Urine--  Hematocrit 46.0  Hemoglobin 14.8    BMP  02-19-23 @ 07:09  Blood Gas Arterial-Calcium,Ionized--  Blood Urea Nitrogen, Serum 21 mg/dL<H> [10 - 20]  Carbon Dioxide, Serum26 mmol/L [17 - 32]  Chloride, Hxtgm413 mmol/L [98 - 110]  Creatinie, Serum0.9 mg/dL [0.7 - 1.5]  Glucose, Serum77 mg/dL [70 - 99]  Potassium, Serum4.5 mmol/L [3.5 - 5.0] [Slighty Hemolyzed use with Caution]  Sodium, Serum 143 mmol/L [135 - 146]  [facility]  02-18-23 @ 18:13  Blood Gas Arterial-Calcium,Ionized--  Blood Urea Nitrogen, Serum 21 mg/dL<H> [10 - 20]  Carbon Dioxide, Serum25 mmol/L [17 - 32]  Chloride, Ozxhn529 mmol/L [98 - 110]  Creatinie, Serum1.1 mg/dL [0.7 - 1.5]  Glucose, Uqcfu699 mg/dL<H> [70 - 99]  Potassium, Serum4.5 mmol/L [3.5 - 5.0]  Sodium, Serum 139 mmol/L [135 - 146]  [facility]  02-18-23 @ 09:31  Blood Gas Arterial-Calcium,Ionized--  Blood Urea Nitrogen, Serum 16 mg/dL [10 - 20]  Carbon Dioxide, Serum29 mmol/L [17 - 32]  Chloride, Jaenr598 mmol/L [98 - 110]  Creatinie, Serum0.9 mg/dL [0.7 - 1.5]  Glucose, Serum70 mg/dL [70 - 99]  Potassium, Serum5.8 mmol/L<H> [3.5 - 5.0]  Sodium, Serum 145 mmol/L [135 - 146]  [facility]  02-17-23 @ 19:12  Blood Gas Arterial-Calcium,Ionized--  Blood Urea Nitrogen, Serum 22 mg/dL<H> [10 - 20]  Carbon Dioxide, Serum26 mmol/L [17 - 32]  Chloride, Wyrzz769 mmol/L [98 - 110]  Creatinie, Serum0.9 mg/dL [0.7 - 1.5]  Glucose, Vqhfg186 mg/dL<H> [70 - 99]  Potassium, Serum4.8 mmol/L [3.5 - 5.0]  Sodium, Serum 141 mmol/L [135 - 146]              Microbiology:    Culture - Urine (collected 02-17-23 @ 22:12)  Source: Clean Catch Clean Catch (Midstream)  Final Report (02-19-23 @ 15:45):    No growth    Culture - Blood (collected 02-17-23 @ 19:18)  Source: .Blood Blood-Peripheral  Preliminary Report (02-18-23 @ 23:01):    No growth to date.    Culture - Blood (collected 02-17-23 @ 19:16)  Source: .Blood Blood-Peripheral  Preliminary Report (02-18-23 @ 23:01):    No growth to date.        Medications:  albuterol/ipratropium for Nebulization 3 milliLiter(s) Nebulizer every 6 hours  budesonide 160 MICROgram(s)/formoterol 4.5 MICROgram(s) Inhaler 2 Puff(s) Inhalation two times a day  enoxaparin Injectable 40 milliGRAM(s) SubCutaneous every 24 hours  influenza  Vaccine (HIGH DOSE) 0.7 milliLiter(s) IntraMuscular once  tiotropium 2.5 MICROgram(s) Inhaler 2 Puff(s) Inhalation daily        Assessment and Plan:  Patient is a 78 y/o Male with PMH of COPD not on home O2, Lung CA recently diagnosed s/p biopsy ([facility]) on 2/13 complicated by  pneumothorax s/p chest tube taken out after 1 day presenting for SOB.    #  Left pneumothorax -recurrent post recent lung biopsy   # Lung Ca  # COPD  # Current Smoker  - Patient had recent left lung biopsy at [place]   - Left pneumothorax post procedure s/p Chest tube > Removed after 24hrs   - s/p L chest tube 02/18  - CTA chest: Bilateral Hilar LN  - 2/20- CXR- no significant pneumothorax.   -  Chest tube was removed on 2/20 by  CT Surgical team- 2/20 and 2/21 CXR - no pneumothorax. stable pulse ox    -medically stable for d/c home today     #DVT - lovenox sq     #Progress Note Handoff: Patient was medically optimized, stable for d/c home   Family discussion: Patient verbalized good understanding of discharge instructions and agreed with discharge plan.  Disposition: Home__today    Total time spent to complete patient's bedside assessment, review medical chart, discuss discharge  plan of care with covering medical team was more than 35 minutes with >50% of time spent face to face with patient, discussion with patient/family and/or coordination of care      
GENERAL SURGERY PROGRESS NOTE    Patient: JAMEL WATERS , 77y ([birth year])Male   MRN: [id number]  Location: [facility] (Back) 026 A  Visit: 02-17-23 Inpatient  Date: 02-19-23 @ 01:51    Hospital Day #: 3    Procedure/Dx/Injuries: left pneumothorax s/p pigtail placement     PAST MEDICAL & SURGICAL HISTORY:  COPD, mild  H/O carpal tunnel repair  H/O basal cell carcinoma excision  H/O melanoma excision  After cataract, bilateral    Vitals:   T(F): 98.5 (02-18-23 @ 20:30), Max: 98.5 (02-18-23 @ 13:20)  HR: 63 (02-18-23 @ 20:30)  BP: 129/61 (02-18-23 @ 20:30)  RR: 19 (02-18-23 @ 05:10)  SpO2: 98% (02-18-23 @ 20:30)    Diet, Regular    Fluids:     I & O's:    PHYSICAL EXAM:  General: NAD, AAOx3, calm and cooperative  HEENT: NCAT, KIAN, EOMI, Trachea ML, Neck supple - no LAD. No supraclavicular LAD  Cardiac: RRR, no Murmurs  Respiratory: CTAB, normal respiratory effort, breath sounds equal BL, no wheeze, rhonchi or crackles. Left pigtal without crepitus at the site. No air leak. On 0.5L of O2.   Abdomen: Soft, non tender.   Musculoskeletal:  compartments soft. Left great toe without swelling or erythema  Neuro: no focal deficits, A&O x 3  Vascular: Extremities well perfused  Skin: Warm/dry, normal color, no jaundice    MEDICATIONS  (STANDING):  albuterol/ipratropium for Nebulization 3 milliLiter(s) Nebulizer every 6 hours  budesonide 160 MICROgram(s)/formoterol 4.5 MICROgram(s) Inhaler 2 Puff(s) Inhalation two times a day  enoxaparin Injectable 40 milliGRAM(s) SubCutaneous every 24 hours  influenza  Vaccine (HIGH DOSE) 0.7 milliLiter(s) IntraMuscular once  methylPREDNISolone sodium succinate Injectable 60 milliGRAM(s) IV Push every 24 hours  tiotropium 2.5 MICROgram(s) Inhaler 2 Puff(s) Inhalation daily    MEDICATIONS  (PRN):    DVT PROPHYLAXIS: enoxaparin Injectable 40 milliGRAM(s) SubCutaneous every 24 hours    GI PROPHYLAXIS:   ANTICOAGULATION:   ANTIBIOTICS:      LAB/STUDIES:  Labs:  CAPILLARY BLOOD GLUCOSE                        14.8   9.43  )-----------( 252      ( 17 Feb 2023 19:18 )             46.0       02-18    139  |  103  |  21<H>  ----------------------------<  173<H>  4.5   |  25  |  1.1    Calcium, Total Serum: 8.5 mg/dL (02-18-23 @ 18:13)    LFTs:             6.5  | 0.3  | 17       ------------------[99      ( 18 Feb 2023 09:31 )  3.7  | x    | 14          Lipase:x      Amylase:x        Blood Gas Venous - Lactate: 2.00 mmol/L (02-17-23 @ 19:10)    Coags:     11.60  ----< 1.02    ( 17 Feb 2023 19:18 )     33.1      CARDIAC MARKERS ( 17 Feb 2023 19:18 )  x     / <0.01 ng/mL / x     / x     / x        Serum Pro-Brain Natriuretic Peptide: 721 pg/mL (02-17-23 @ 19:18)    Culture - Blood (collected 17 Feb 2023 19:18)  Source: .Blood Blood-Peripheral  Preliminary Report (18 Feb 2023 23:01):    No growth to date.    Culture - Blood (collected 17 Feb 2023 19:16)  Source: .Blood Blood-Peripheral  Preliminary Report (18 Feb 2023 23:01):    No growth to date.

## 2023-02-21 NOTE — DISCHARGE NOTE NURSING/CASE MANAGEMENT/SOCIAL WORK - NSDCPEFALRISK_GEN_ALL_CORE
For information on Fall & Injury Prevention, visit: [email or web address] OR  [email or web address] OR  https://www.cdc.gov/steadi/patient.html

## 2023-02-22 LAB
CULTURE RESULTS: SIGNIFICANT CHANGE UP
CULTURE RESULTS: SIGNIFICANT CHANGE UP
SPECIMEN SOURCE: SIGNIFICANT CHANGE UP
SPECIMEN SOURCE: SIGNIFICANT CHANGE UP

## 2023-02-24 DIAGNOSIS — Z98.42 CATARACT EXTRACTION STATUS, LEFT EYE: ICD-10-CM

## 2023-02-24 DIAGNOSIS — M10.9 GOUT, UNSPECIFIED: ICD-10-CM

## 2023-02-24 DIAGNOSIS — J44.1 CHRONIC OBSTRUCTIVE PULMONARY DISEASE WITH (ACUTE) EXACERBATION: ICD-10-CM

## 2023-02-24 DIAGNOSIS — F17.210 NICOTINE DEPENDENCE, CIGARETTES, UNCOMPLICATED: ICD-10-CM

## 2023-02-24 DIAGNOSIS — C34.12 MALIGNANT NEOPLASM OF UPPER LOBE, LEFT BRONCHUS OR LUNG: ICD-10-CM

## 2023-02-24 DIAGNOSIS — Z85.820 PERSONAL HISTORY OF MALIGNANT MELANOMA OF SKIN: ICD-10-CM

## 2023-02-24 DIAGNOSIS — Z85.828 PERSONAL HISTORY OF OTHER MALIGNANT NEOPLASM OF SKIN: ICD-10-CM

## 2023-02-24 DIAGNOSIS — J95.811 POSTPROCEDURAL PNEUMOTHORAX: ICD-10-CM
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
## 2023-02-24 DIAGNOSIS — R06.02 SHORTNESS OF BREATH: ICD-10-CM

## 2023-02-24 DIAGNOSIS — Z98.41 CATARACT EXTRACTION STATUS, RIGHT EYE: ICD-10-CM
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
## 2023-02-24 DIAGNOSIS — E78.5 HYPERLIPIDEMIA, UNSPECIFIED: ICD-10-CM

## 2023-03-03 ENCOUNTER — OUTPATIENT (OUTPATIENT)
Dept: OUTPATIENT SERVICES | Facility: HOSPITAL | Age: 78
LOS: 1 days | End: 2023-03-03
Payer: MEDICARE

## 2023-03-03 ENCOUNTER — LABORATORY RESULT (OUTPATIENT)
Age: 78
End: 2023-03-03

## 2023-03-03 VITALS
HEART RATE: 74 BPM | TEMPERATURE: 98 F | WEIGHT: 164.91 LBS | RESPIRATION RATE: 18 BRPM | OXYGEN SATURATION: 96 % | SYSTOLIC BLOOD PRESSURE: 124 MMHG | HEIGHT: 69 IN | DIASTOLIC BLOOD PRESSURE: 63 MMHG

## 2023-03-03 DIAGNOSIS — H26.40 UNSPECIFIED SECONDARY CATARACT: Chronic | ICD-10-CM

## 2023-03-03 DIAGNOSIS — Z98.890 OTHER SPECIFIED POSTPROCEDURAL STATES: Chronic | ICD-10-CM

## 2023-03-03 DIAGNOSIS — C34.01 MALIGNANT NEOPLASM OF RIGHT MAIN BRONCHUS: ICD-10-CM

## 2023-03-03 DIAGNOSIS — Z01.818 ENCOUNTER FOR OTHER PREPROCEDURAL EXAMINATION: ICD-10-CM

## 2023-03-03 DIAGNOSIS — Z93.8 OTHER ARTIFICIAL OPENING STATUS: Chronic | ICD-10-CM

## 2023-03-03 PROBLEM — Z00.00 ENCOUNTER FOR PREVENTIVE HEALTH EXAMINATION: Status: ACTIVE | Noted: 2023-03-03

## 2023-03-03 PROCEDURE — 99214 OFFICE O/P EST MOD 30 MIN: CPT | Mod: 25

## 2023-03-03 NOTE — H&P PST ADULT - NSICDXPASTMEDICALHX_GEN_ALL_CORE_FT
PAST MEDICAL HISTORY:  COPD, mild      PAST MEDICAL HISTORY:  Basal cell carcinoma     COPD, mild     Lung cancer     Melanoma of skin     Pneumothorax, post biopsy, left     Smoker

## 2023-03-03 NOTE — H&P PST ADULT - RESPIRATORY
normal/clear to auscultation bilaterally/no wheezes/no rales/no rhonchi no rales/no rhonchi/airway patent/diminished breath sounds, L/wheezes

## 2023-03-03 NOTE — H&P PST ADULT - NSICDXPASTSURGICALHX_GEN_ALL_CORE_FT
PAST SURGICAL HISTORY:  After cataract, bilateral     H/O basal cell carcinoma excision     H/O carpal tunnel repair     H/O melanoma excision      PAST SURGICAL HISTORY:  After cataract, bilateral     H/O basal cell carcinoma excision     H/O carpal tunnel repair     H/O melanoma excision     S/P chest tube placement

## 2023-03-03 NOTE — H&P PST ADULT - HISTORY OF PRESENT ILLNESS
Patient denies any cp, sob, palpitations, fever, cough, URI, abdominal pains, N/V, UTI, Rashes or open wounds.  As per patient exercise tolerance of 1/2 fos walks with out sob  Patient had COVID x 2 08/2022 and 09/2022  Patient denies any s/s covid 19 and reports no contact with known positive people. Patient has appointment for repeat covid testing pre op and instructed to continue to self monitor and report any concerns to MD. Pt will continue to practice self isolation and  exposure control measures pre op  Anesthesia Alert  NO--Difficult Airway  NO--History of neck surgery or radiation  NO--Limited ROM of neck  NO--History of Malignant hyperthermia  NO--Personal or family history of Pseudocholinesterase deficiency  NO--Prior Anesthesia Complication  NO--Latex Allergy  NO--Loose teeth. Dentures Top and Bottom   NO--History of Rheumatoid Arthritis  NO--CHRISS  NO-Bleeding Risk   Pt instructed to stop vitamins/supplements/herbal medications for one week prior to surgery  As per patient this is the complete medical, surgical history and medications.   Chung Quiroga is a 78 yo male with PMH of COPD, Smoker, recent diagnosis of Lung cancer S/P Lung Biopsy on 02/17/2023, Left Pneumothorax and S/p Chest tube placement x 1 day, Basal cell CA, Carpal tunnel, Melanoma removal who presents to pretesting for port placement for Chemotherapy.   Patient denies any cp, sob, palpitations, fever, cough, URI, abdominal pains, N/V, UTI, Rashes or open wounds. Patient c/o [employer].   As per patient exercise tolerance of 1/2 fos walks with out sob  Patient had COVID x 2 08/2022 and 09/2022  Patient denies any s/s covid 19 and reports no contact with known positive people. Patient has appointment for repeat covid testing pre op and instructed to continue to self monitor and report any concerns to MD. Pt will continue to practice self isolation and  exposure control measures pre op  Anesthesia Alert  NO--Difficult Airway  NO--History of neck surgery or radiation  NO--Limited ROM of neck  NO--History of Malignant hyperthermia  NO--Personal or family history of Pseudocholinesterase deficiency  NO--Prior Anesthesia Complication  NO--Latex Allergy  NO--Loose teeth. Dentures Top and Bottom   NO--History of Rheumatoid Arthritis  NO--CHRISS  NO-Bleeding Risk   Pt instructed to stop vitamins/supplements/herbal medications for one week prior to surgery  As per patient this is the complete medical, surgical history and medications.   Chung Quiroga is a 76 yo male with PMH of COPD, Smoker, recent diagnosis of Lung cancer S/P Lung Biopsy on 02/13/2023 ([facility]), Left Pneumothorax and S/p Chest tube placement x 1 day, Patient developed Sob and Pneumothorax on 02/17 and admitted to [facility] and Chest tube placement on 2/18/2023 and D/C'd CT on 02/20/2023, Basal cell CA, Carpal tunnel, Melanoma removal who presents to pretesting for port placement for Chemotherapy.   Patient denies any cp, sob, palpitations, fever, cough, URI, abdominal pains, N/V, UTI, Rashes or open wounds. Patient c/o [employer].   As per patient exercise tolerance of 1/2 fos walks with out sob  Patient had COVID x 2 08/2022 and 09/2022  Patient denies any s/s covid 19 and reports no contact with known positive people. Patient has appointment for repeat covid testing pre op and instructed to continue to self monitor and report any concerns to MD. Pt will continue to practice self isolation and  exposure control measures pre op  Anesthesia Alert  NO--Difficult Airway  NO--History of neck surgery or radiation  NO--Limited ROM of neck  NO--History of Malignant hyperthermia  NO--Personal or family history of Pseudocholinesterase deficiency  NO--Prior Anesthesia Complication  NO--Latex Allergy  NO--Loose teeth. Dentures Top and Bottom   NO--History of Rheumatoid Arthritis  NO--CHRISS  NO-Bleeding Risk   Pt instructed to stop vitamins/supplements/herbal medications for one week prior to surgery  As per patient this is the complete medical, surgical history and medications.

## 2023-03-03 NOTE — H&P PST ADULT - REASON FOR ADMISSION
Case Type: OP Non-block TimeSuite: Interventional RadiologyProceduralist: Eric Kumar  Confirmed Surgery DateTime: 03- - 0:00PAST DateTime: 03- - 0:00Procedure: Port Placement  ERP?: UnavailableLaterality: N/ALength of Procedure: 60 Minutes  Anesthesia Type: Local Standby Case Type: OP   Suite: Interventional Radiology  Proceduralist: Eric Kumar  Confirmed Surgery DateTime: 03- - 0:00PAST DateTime: 03- - 0:00Procedure: Port Placement  Laterality: N/A  Length of Procedure: 60 Minutes  Anesthesia Type: Local Standby

## 2023-03-04 DIAGNOSIS — Z01.818 ENCOUNTER FOR OTHER PREPROCEDURAL EXAMINATION: ICD-10-CM

## 2023-03-04 DIAGNOSIS — C34.01 MALIGNANT NEOPLASM OF RIGHT MAIN BRONCHUS: ICD-10-CM

## 2023-03-07 RX ORDER — CEFAZOLIN SODIUM 1 G
2000 VIAL (EA) INJECTION ONCE
Refills: 0 | Status: DISCONTINUED | OUTPATIENT
Start: 2023-03-08 | End: 2023-03-09

## 2023-03-08 ENCOUNTER — OUTPATIENT (OUTPATIENT)
Dept: OUTPATIENT SERVICES | Facility: HOSPITAL | Age: 78
LOS: 1 days | Discharge: HOME | End: 2023-03-08
Payer: MEDICARE

## 2023-03-08 ENCOUNTER — TRANSCRIPTION ENCOUNTER (OUTPATIENT)
Age: 78
End: 2023-03-08

## 2023-03-08 ENCOUNTER — RESULT REVIEW (OUTPATIENT)
Age: 78
End: 2023-03-08

## 2023-03-08 VITALS
HEIGHT: 69 IN | DIASTOLIC BLOOD PRESSURE: 64 MMHG | HEART RATE: 55 BPM | TEMPERATURE: 97 F | WEIGHT: 164.02 LBS | SYSTOLIC BLOOD PRESSURE: 127 MMHG | OXYGEN SATURATION: 95 % | RESPIRATION RATE: 16 BRPM

## 2023-03-08 VITALS
RESPIRATION RATE: 9 BRPM | DIASTOLIC BLOOD PRESSURE: 66 MMHG | TEMPERATURE: 98 F | SYSTOLIC BLOOD PRESSURE: 130 MMHG | OXYGEN SATURATION: 96 % | HEART RATE: 64 BPM

## 2023-03-08 DIAGNOSIS — Z93.8 OTHER ARTIFICIAL OPENING STATUS: Chronic | ICD-10-CM

## 2023-03-08 DIAGNOSIS — H26.40 UNSPECIFIED SECONDARY CATARACT: Chronic | ICD-10-CM

## 2023-03-08 DIAGNOSIS — Z98.890 OTHER SPECIFIED POSTPROCEDURAL STATES: Chronic | ICD-10-CM

## 2023-03-08 DIAGNOSIS — Z45.2 ENCOUNTER FOR ADJUSTMENT AND MANAGEMENT OF VASCULAR ACCESS DEVICE: ICD-10-CM

## 2023-03-08 DIAGNOSIS — C34.01 MALIGNANT NEOPLASM OF RIGHT MAIN BRONCHUS: ICD-10-CM

## 2023-03-08 PROCEDURE — 36561 INSERT TUNNELED CV CATH: CPT

## 2023-03-08 PROCEDURE — 76937 US GUIDE VASCULAR ACCESS: CPT

## 2023-03-08 PROCEDURE — 76937 US GUIDE VASCULAR ACCESS: CPT | Mod: 26

## 2023-03-08 PROCEDURE — C1769: CPT

## 2023-03-08 PROCEDURE — 77001 FLUOROGUIDE FOR VEIN DEVICE: CPT

## 2023-03-08 PROCEDURE — 77001 FLUOROGUIDE FOR VEIN DEVICE: CPT | Mod: 26

## 2023-03-08 PROCEDURE — C1788: CPT

## 2023-03-08 NOTE — ASU PATIENT PROFILE, ADULT - FALL HARM RISK - UNIVERSAL INTERVENTIONS
Bed in lowest position, wheels locked, appropriate side rails in place/Call bell, personal items and telephone in reach/Instruct patient to call for assistance before getting out of bed or chair/Non-slip footwear when patient is out of bed/[place] to call system/Physically safe environment - no spills, clutter or unnecessary equipment/Purposeful Proactive Rounding/Room/bathroom lighting operational, light cord in reach

## 2023-03-08 NOTE — ASU PATIENT PROFILE, ADULT - NSICDXPASTMEDICALHX_GEN_ALL_CORE_FT
PAST MEDICAL HISTORY:  Basal cell carcinoma     COPD, mild     Lung cancer     Melanoma of skin     Pneumothorax, post biopsy, left     Smoker

## 2023-03-08 NOTE — ASU PATIENT PROFILE, ADULT - NSICDXPASTSURGICALHX_GEN_ALL_CORE_FT
PAST SURGICAL HISTORY:  After cataract, bilateral     H/O basal cell carcinoma excision     H/O carpal tunnel repair     H/O melanoma excision     S/P chest tube placement

## 2023-03-08 NOTE — PROGRESS NOTE ADULT - SUBJECTIVE AND OBJECTIVE BOX
PRE-OPERATIVE DAY OF PROCEDURE EVALUATION:     I have personally seen and examined this patient.  I agree with the history and physical which I have reviewed and noted any changes below:     Plan is for image-guided chest-port placement with possible intravenous conscious sedation.    Procedure/ risks/ benefits/ goals/ alternatives were explained.  All questions answered. Informed content obtained from patient.  Consent placed in chart.

## 2023-03-08 NOTE — ASU DISCHARGE PLAN (ADULT/PEDIATRIC) - NS MD DC FALL RISK RISK
For information on Fall & Injury Prevention, visit: [email or web address] OR  [email or web address] OR  https://www.cdc.gov/steadi/patient.html

## 2023-08-01 PROBLEM — C34.90 MALIGNANT NEOPLASM OF UNSPECIFIED PART OF UNSPECIFIED BRONCHUS OR LUNG: Chronic | Status: ACTIVE | Noted: 2023-03-03

## 2023-08-01 PROBLEM — C43.9 MALIGNANT MELANOMA OF SKIN, UNSPECIFIED: Chronic | Status: ACTIVE | Noted: 2023-03-03

## 2023-08-01 PROBLEM — C44.91 BASAL CELL CARCINOMA OF SKIN, UNSPECIFIED: Chronic | Status: ACTIVE | Noted: 2023-03-03

## 2023-08-01 PROBLEM — J95.811 POSTPROCEDURAL PNEUMOTHORAX: Chronic | Status: ACTIVE | Noted: 2023-03-03

## 2023-08-01 PROBLEM — F17.200 NICOTINE DEPENDENCE, UNSPECIFIED, UNCOMPLICATED: Chronic | Status: ACTIVE | Noted: 2023-03-03

## 2023-08-02 ENCOUNTER — OUTPATIENT (OUTPATIENT)
Dept: OUTPATIENT SERVICES | Facility: HOSPITAL | Age: 78
LOS: 1 days | End: 2023-08-02
Payer: MEDICARE

## 2023-08-02 ENCOUNTER — APPOINTMENT (OUTPATIENT)
Dept: ULTRASOUND IMAGING | Facility: HOSPITAL | Age: 78
End: 2023-08-02
Payer: MEDICARE

## 2023-08-02 DIAGNOSIS — Z93.8 OTHER ARTIFICIAL OPENING STATUS: Chronic | ICD-10-CM

## 2023-08-02 DIAGNOSIS — Z98.890 OTHER SPECIFIED POSTPROCEDURAL STATES: Chronic | ICD-10-CM

## 2023-08-02 DIAGNOSIS — I82.409 ACUTE EMBOLISM AND THROMBOSIS OF UNSPECIFIED DEEP VEINS OF UNSPECIFIED LOWER EXTREMITY: ICD-10-CM

## 2023-08-02 DIAGNOSIS — Z00.8 ENCOUNTER FOR OTHER GENERAL EXAMINATION: ICD-10-CM

## 2023-08-02 DIAGNOSIS — H26.40 UNSPECIFIED SECONDARY CATARACT: Chronic | ICD-10-CM

## 2023-08-02 PROCEDURE — 93970 EXTREMITY STUDY: CPT | Mod: 26

## 2023-08-02 PROCEDURE — 93970 EXTREMITY STUDY: CPT

## 2023-08-03 DIAGNOSIS — I82.409 ACUTE EMBOLISM AND THROMBOSIS OF UNSPECIFIED DEEP VEINS OF UNSPECIFIED LOWER EXTREMITY: ICD-10-CM

## 2023-09-14 ENCOUNTER — RESULT REVIEW (OUTPATIENT)
Age: 78
End: 2023-09-14

## 2023-09-14 ENCOUNTER — APPOINTMENT (OUTPATIENT)
Dept: ULTRASOUND IMAGING | Facility: HOSPITAL | Age: 78
End: 2023-09-14
Payer: MEDICARE

## 2023-09-14 ENCOUNTER — OUTPATIENT (OUTPATIENT)
Dept: OUTPATIENT SERVICES | Facility: HOSPITAL | Age: 78
LOS: 1 days | End: 2023-09-14
Payer: MEDICARE

## 2023-09-14 DIAGNOSIS — Z93.8 OTHER ARTIFICIAL OPENING STATUS: Chronic | ICD-10-CM

## 2023-09-14 DIAGNOSIS — Z98.890 OTHER SPECIFIED POSTPROCEDURAL STATES: Chronic | ICD-10-CM

## 2023-09-14 DIAGNOSIS — H26.40 UNSPECIFIED SECONDARY CATARACT: Chronic | ICD-10-CM

## 2023-09-14 DIAGNOSIS — M79.89 OTHER SPECIFIED SOFT TISSUE DISORDERS: ICD-10-CM

## 2023-09-14 DIAGNOSIS — Z00.8 ENCOUNTER FOR OTHER GENERAL EXAMINATION: ICD-10-CM

## 2023-09-14 PROCEDURE — 93925 LOWER EXTREMITY STUDY: CPT | Mod: 26

## 2023-09-14 PROCEDURE — 93925 LOWER EXTREMITY STUDY: CPT

## 2023-09-15 DIAGNOSIS — M79.89 OTHER SPECIFIED SOFT TISSUE DISORDERS: ICD-10-CM

## 2023-11-17 ENCOUNTER — INPATIENT (INPATIENT)
Facility: HOSPITAL | Age: 78
LOS: 3 days | Discharge: HOME | DRG: 177 | End: 2023-11-21
Attending: STUDENT IN AN ORGANIZED HEALTH CARE EDUCATION/TRAINING PROGRAM | Admitting: STUDENT IN AN ORGANIZED HEALTH CARE EDUCATION/TRAINING PROGRAM
Payer: MEDICARE

## 2023-11-17 VITALS
DIASTOLIC BLOOD PRESSURE: 78 MMHG | RESPIRATION RATE: 22 BRPM | HEART RATE: 83 BPM | OXYGEN SATURATION: 94 % | WEIGHT: 169.98 LBS | SYSTOLIC BLOOD PRESSURE: 117 MMHG | TEMPERATURE: 99 F

## 2023-11-17 DIAGNOSIS — H26.40 UNSPECIFIED SECONDARY CATARACT: Chronic | ICD-10-CM

## 2023-11-17 DIAGNOSIS — Z98.890 OTHER SPECIFIED POSTPROCEDURAL STATES: Chronic | ICD-10-CM

## 2023-11-17 DIAGNOSIS — J18.9 PNEUMONIA, UNSPECIFIED ORGANISM: ICD-10-CM

## 2023-11-17 DIAGNOSIS — Z93.8 OTHER ARTIFICIAL OPENING STATUS: Chronic | ICD-10-CM

## 2023-11-17 LAB
ACANTHOCYTES BLD QL SMEAR: SLIGHT — SIGNIFICANT CHANGE UP
ACANTHOCYTES BLD QL SMEAR: SLIGHT — SIGNIFICANT CHANGE UP
ALBUMIN SERPL ELPH-MCNC: 2.9 G/DL — LOW (ref 3.5–5.2)
ALBUMIN SERPL ELPH-MCNC: 2.9 G/DL — LOW (ref 3.5–5.2)
ALP SERPL-CCNC: 124 U/L — HIGH (ref 30–115)
ALP SERPL-CCNC: 124 U/L — HIGH (ref 30–115)
ALT FLD-CCNC: 47 U/L — HIGH (ref 0–41)
ALT FLD-CCNC: 47 U/L — HIGH (ref 0–41)
ANION GAP SERPL CALC-SCNC: 12 MMOL/L — SIGNIFICANT CHANGE UP (ref 7–14)
ANION GAP SERPL CALC-SCNC: 12 MMOL/L — SIGNIFICANT CHANGE UP (ref 7–14)
ANISOCYTOSIS BLD QL: SLIGHT — SIGNIFICANT CHANGE UP
ANISOCYTOSIS BLD QL: SLIGHT — SIGNIFICANT CHANGE UP
AST SERPL-CCNC: 44 U/L — HIGH (ref 0–41)
AST SERPL-CCNC: 44 U/L — HIGH (ref 0–41)
BASE EXCESS BLDV CALC-SCNC: 0.9 MMOL/L — SIGNIFICANT CHANGE UP (ref -2–3)
BASE EXCESS BLDV CALC-SCNC: 0.9 MMOL/L — SIGNIFICANT CHANGE UP (ref -2–3)
BASOPHILS # BLD AUTO: 0.06 K/UL — SIGNIFICANT CHANGE UP (ref 0–0.2)
BASOPHILS # BLD AUTO: 0.06 K/UL — SIGNIFICANT CHANGE UP (ref 0–0.2)
BASOPHILS NFR BLD AUTO: 0.9 % — SIGNIFICANT CHANGE UP (ref 0–1)
BASOPHILS NFR BLD AUTO: 0.9 % — SIGNIFICANT CHANGE UP (ref 0–1)
BILIRUB SERPL-MCNC: 0.4 MG/DL — SIGNIFICANT CHANGE UP (ref 0.2–1.2)
BILIRUB SERPL-MCNC: 0.4 MG/DL — SIGNIFICANT CHANGE UP (ref 0.2–1.2)
BUN SERPL-MCNC: 24 MG/DL — HIGH (ref 10–20)
BUN SERPL-MCNC: 24 MG/DL — HIGH (ref 10–20)
BURR CELLS BLD QL SMEAR: PRESENT — SIGNIFICANT CHANGE UP
BURR CELLS BLD QL SMEAR: PRESENT — SIGNIFICANT CHANGE UP
CA-I SERPL-SCNC: 1.13 MMOL/L — LOW (ref 1.15–1.33)
CA-I SERPL-SCNC: 1.13 MMOL/L — LOW (ref 1.15–1.33)
CALCIUM SERPL-MCNC: 8.2 MG/DL — LOW (ref 8.4–10.5)
CALCIUM SERPL-MCNC: 8.2 MG/DL — LOW (ref 8.4–10.5)
CHLORIDE SERPL-SCNC: 102 MMOL/L — SIGNIFICANT CHANGE UP (ref 98–110)
CHLORIDE SERPL-SCNC: 102 MMOL/L — SIGNIFICANT CHANGE UP (ref 98–110)
CO2 SERPL-SCNC: 23 MMOL/L — SIGNIFICANT CHANGE UP (ref 17–32)
CO2 SERPL-SCNC: 23 MMOL/L — SIGNIFICANT CHANGE UP (ref 17–32)
CREAT SERPL-MCNC: 0.9 MG/DL — SIGNIFICANT CHANGE UP (ref 0.7–1.5)
CREAT SERPL-MCNC: 0.9 MG/DL — SIGNIFICANT CHANGE UP (ref 0.7–1.5)
DACRYOCYTES BLD QL SMEAR: SLIGHT — SIGNIFICANT CHANGE UP
DACRYOCYTES BLD QL SMEAR: SLIGHT — SIGNIFICANT CHANGE UP
EGFR: 86 ML/MIN/1.73M2 — SIGNIFICANT CHANGE UP
EGFR: 86 ML/MIN/1.73M2 — SIGNIFICANT CHANGE UP
EOSINOPHIL # BLD AUTO: 0 K/UL — SIGNIFICANT CHANGE UP (ref 0–0.7)
EOSINOPHIL # BLD AUTO: 0 K/UL — SIGNIFICANT CHANGE UP (ref 0–0.7)
EOSINOPHIL NFR BLD AUTO: 0 % — SIGNIFICANT CHANGE UP (ref 0–8)
EOSINOPHIL NFR BLD AUTO: 0 % — SIGNIFICANT CHANGE UP (ref 0–8)
FLUAV AG NPH QL: SIGNIFICANT CHANGE UP
FLUAV AG NPH QL: SIGNIFICANT CHANGE UP
FLUBV AG NPH QL: SIGNIFICANT CHANGE UP
FLUBV AG NPH QL: SIGNIFICANT CHANGE UP
GAS PNL BLDV: 129 MMOL/L — LOW (ref 136–145)
GAS PNL BLDV: 129 MMOL/L — LOW (ref 136–145)
GAS PNL BLDV: SIGNIFICANT CHANGE UP
GAS PNL BLDV: SIGNIFICANT CHANGE UP
GIANT PLATELETS BLD QL SMEAR: PRESENT — SIGNIFICANT CHANGE UP
GIANT PLATELETS BLD QL SMEAR: PRESENT — SIGNIFICANT CHANGE UP
GLUCOSE SERPL-MCNC: 121 MG/DL — HIGH (ref 70–99)
GLUCOSE SERPL-MCNC: 121 MG/DL — HIGH (ref 70–99)
HCO3 BLDV-SCNC: 25 MMOL/L — SIGNIFICANT CHANGE UP (ref 22–29)
HCO3 BLDV-SCNC: 25 MMOL/L — SIGNIFICANT CHANGE UP (ref 22–29)
HCT VFR BLD CALC: 30 % — LOW (ref 42–52)
HCT VFR BLD CALC: 30 % — LOW (ref 42–52)
HCT VFR BLDA CALC: 58 % — CRITICAL HIGH (ref 39–51)
HCT VFR BLDA CALC: 58 % — CRITICAL HIGH (ref 39–51)
HGB BLD CALC-MCNC: 19.3 G/DL — CRITICAL HIGH (ref 12.6–17.4)
HGB BLD CALC-MCNC: 19.3 G/DL — CRITICAL HIGH (ref 12.6–17.4)
HGB BLD-MCNC: 9.4 G/DL — LOW (ref 14–18)
HGB BLD-MCNC: 9.4 G/DL — LOW (ref 14–18)
LACTATE BLDV-MCNC: 1.4 MMOL/L — SIGNIFICANT CHANGE UP (ref 0.5–2)
LACTATE BLDV-MCNC: 1.4 MMOL/L — SIGNIFICANT CHANGE UP (ref 0.5–2)
LYMPHOCYTES # BLD AUTO: 1.42 K/UL — SIGNIFICANT CHANGE UP (ref 1.2–3.4)
LYMPHOCYTES # BLD AUTO: 1.42 K/UL — SIGNIFICANT CHANGE UP (ref 1.2–3.4)
LYMPHOCYTES # BLD AUTO: 20.9 % — SIGNIFICANT CHANGE UP (ref 20.5–51.1)
LYMPHOCYTES # BLD AUTO: 20.9 % — SIGNIFICANT CHANGE UP (ref 20.5–51.1)
MACROCYTES BLD QL: SLIGHT — SIGNIFICANT CHANGE UP
MACROCYTES BLD QL: SLIGHT — SIGNIFICANT CHANGE UP
MANUAL SMEAR VERIFICATION: SIGNIFICANT CHANGE UP
MANUAL SMEAR VERIFICATION: SIGNIFICANT CHANGE UP
MCHC RBC-ENTMCNC: 31.3 G/DL — LOW (ref 32–37)
MCHC RBC-ENTMCNC: 31.3 G/DL — LOW (ref 32–37)
MCHC RBC-ENTMCNC: 31.4 PG — HIGH (ref 27–31)
MCHC RBC-ENTMCNC: 31.4 PG — HIGH (ref 27–31)
MCV RBC AUTO: 100.3 FL — HIGH (ref 80–94)
MCV RBC AUTO: 100.3 FL — HIGH (ref 80–94)
MONOCYTES # BLD AUTO: 1.6 K/UL — HIGH (ref 0.1–0.6)
MONOCYTES # BLD AUTO: 1.6 K/UL — HIGH (ref 0.1–0.6)
MONOCYTES NFR BLD AUTO: 23.5 % — HIGH (ref 1.7–9.3)
MONOCYTES NFR BLD AUTO: 23.5 % — HIGH (ref 1.7–9.3)
NEUTROPHILS # BLD AUTO: 3.07 K/UL — SIGNIFICANT CHANGE UP (ref 1.4–6.5)
NEUTROPHILS # BLD AUTO: 3.07 K/UL — SIGNIFICANT CHANGE UP (ref 1.4–6.5)
NEUTROPHILS NFR BLD AUTO: 45.2 % — SIGNIFICANT CHANGE UP (ref 42.2–75.2)
NEUTROPHILS NFR BLD AUTO: 45.2 % — SIGNIFICANT CHANGE UP (ref 42.2–75.2)
PCO2 BLDV: 39 MMHG — LOW (ref 42–55)
PCO2 BLDV: 39 MMHG — LOW (ref 42–55)
PH BLDV: 7.42 — SIGNIFICANT CHANGE UP (ref 7.32–7.43)
PH BLDV: 7.42 — SIGNIFICANT CHANGE UP (ref 7.32–7.43)
PLAT MORPH BLD: ABNORMAL
PLAT MORPH BLD: ABNORMAL
PLATELET # BLD AUTO: 123 K/UL — LOW (ref 130–400)
PLATELET # BLD AUTO: 123 K/UL — LOW (ref 130–400)
PMV BLD: 9.3 FL — SIGNIFICANT CHANGE UP (ref 7.4–10.4)
PMV BLD: 9.3 FL — SIGNIFICANT CHANGE UP (ref 7.4–10.4)
PO2 BLDV: 35 MMHG — SIGNIFICANT CHANGE UP
PO2 BLDV: 35 MMHG — SIGNIFICANT CHANGE UP
POIKILOCYTOSIS BLD QL AUTO: SLIGHT — SIGNIFICANT CHANGE UP
POIKILOCYTOSIS BLD QL AUTO: SLIGHT — SIGNIFICANT CHANGE UP
POLYCHROMASIA BLD QL SMEAR: SLIGHT — SIGNIFICANT CHANGE UP
POLYCHROMASIA BLD QL SMEAR: SLIGHT — SIGNIFICANT CHANGE UP
POTASSIUM BLDV-SCNC: 4.3 MMOL/L — SIGNIFICANT CHANGE UP (ref 3.5–5.1)
POTASSIUM BLDV-SCNC: 4.3 MMOL/L — SIGNIFICANT CHANGE UP (ref 3.5–5.1)
POTASSIUM SERPL-MCNC: 4.3 MMOL/L — SIGNIFICANT CHANGE UP (ref 3.5–5)
POTASSIUM SERPL-MCNC: 4.3 MMOL/L — SIGNIFICANT CHANGE UP (ref 3.5–5)
POTASSIUM SERPL-SCNC: 4.3 MMOL/L — SIGNIFICANT CHANGE UP (ref 3.5–5)
POTASSIUM SERPL-SCNC: 4.3 MMOL/L — SIGNIFICANT CHANGE UP (ref 3.5–5)
PROT SERPL-MCNC: 6.2 G/DL — SIGNIFICANT CHANGE UP (ref 6–8)
PROT SERPL-MCNC: 6.2 G/DL — SIGNIFICANT CHANGE UP (ref 6–8)
RBC # BLD: 2.99 M/UL — LOW (ref 4.7–6.1)
RBC # BLD: 2.99 M/UL — LOW (ref 4.7–6.1)
RBC # FLD: 15.7 % — HIGH (ref 11.5–14.5)
RBC # FLD: 15.7 % — HIGH (ref 11.5–14.5)
RBC BLD AUTO: ABNORMAL
RBC BLD AUTO: ABNORMAL
RSV RNA NPH QL NAA+NON-PROBE: SIGNIFICANT CHANGE UP
RSV RNA NPH QL NAA+NON-PROBE: SIGNIFICANT CHANGE UP
SAO2 % BLDV: 51.9 % — SIGNIFICANT CHANGE UP
SAO2 % BLDV: 51.9 % — SIGNIFICANT CHANGE UP
SARS-COV-2 RNA SPEC QL NAA+PROBE: SIGNIFICANT CHANGE UP
SARS-COV-2 RNA SPEC QL NAA+PROBE: SIGNIFICANT CHANGE UP
SODIUM SERPL-SCNC: 137 MMOL/L — SIGNIFICANT CHANGE UP (ref 135–146)
SODIUM SERPL-SCNC: 137 MMOL/L — SIGNIFICANT CHANGE UP (ref 135–146)
TROPONIN T SERPL-MCNC: <0.01 NG/ML — SIGNIFICANT CHANGE UP
TROPONIN T SERPL-MCNC: <0.01 NG/ML — SIGNIFICANT CHANGE UP
VARIANT LYMPHS # BLD: 9.5 % — HIGH (ref 0–5)
VARIANT LYMPHS # BLD: 9.5 % — HIGH (ref 0–5)
WBC # BLD: 6.8 K/UL — SIGNIFICANT CHANGE UP (ref 4.8–10.8)
WBC # BLD: 6.8 K/UL — SIGNIFICANT CHANGE UP (ref 4.8–10.8)
WBC # FLD AUTO: 6.8 K/UL — SIGNIFICANT CHANGE UP (ref 4.8–10.8)
WBC # FLD AUTO: 6.8 K/UL — SIGNIFICANT CHANGE UP (ref 4.8–10.8)

## 2023-11-17 PROCEDURE — 87449 NOS EACH ORGANISM AG IA: CPT

## 2023-11-17 PROCEDURE — 83880 ASSAY OF NATRIURETIC PEPTIDE: CPT

## 2023-11-17 PROCEDURE — 87641 MR-STAPH DNA AMP PROBE: CPT

## 2023-11-17 PROCEDURE — 76705 ECHO EXAM OF ABDOMEN: CPT

## 2023-11-17 PROCEDURE — 92610 EVALUATE SWALLOWING FUNCTION: CPT | Mod: GN

## 2023-11-17 PROCEDURE — 0225U NFCT DS DNA&RNA 21 SARSCOV2: CPT

## 2023-11-17 PROCEDURE — 99285 EMERGENCY DEPT VISIT HI MDM: CPT | Mod: FS

## 2023-11-17 PROCEDURE — 85025 COMPLETE CBC W/AUTO DIFF WBC: CPT

## 2023-11-17 PROCEDURE — 94640 AIRWAY INHALATION TREATMENT: CPT

## 2023-11-17 PROCEDURE — 84145 PROCALCITONIN (PCT): CPT

## 2023-11-17 PROCEDURE — 36415 COLL VENOUS BLD VENIPUNCTURE: CPT

## 2023-11-17 PROCEDURE — 83540 ASSAY OF IRON: CPT

## 2023-11-17 PROCEDURE — 71275 CT ANGIOGRAPHY CHEST: CPT | Mod: 26,MA

## 2023-11-17 PROCEDURE — 83735 ASSAY OF MAGNESIUM: CPT

## 2023-11-17 PROCEDURE — 80053 COMPREHEN METABOLIC PANEL: CPT

## 2023-11-17 PROCEDURE — 83550 IRON BINDING TEST: CPT

## 2023-11-17 PROCEDURE — 71045 X-RAY EXAM CHEST 1 VIEW: CPT

## 2023-11-17 PROCEDURE — 82607 VITAMIN B-12: CPT

## 2023-11-17 PROCEDURE — 82728 ASSAY OF FERRITIN: CPT

## 2023-11-17 PROCEDURE — 82746 ASSAY OF FOLIC ACID SERUM: CPT

## 2023-11-17 PROCEDURE — 71045 X-RAY EXAM CHEST 1 VIEW: CPT | Mod: 26

## 2023-11-17 PROCEDURE — 87640 STAPH A DNA AMP PROBE: CPT

## 2023-11-17 RX ORDER — ACETAMINOPHEN 500 MG
975 TABLET ORAL ONCE
Refills: 0 | Status: COMPLETED | OUTPATIENT
Start: 2023-11-17 | End: 2023-11-17

## 2023-11-17 RX ORDER — AZITHROMYCIN 500 MG/1
500 TABLET, FILM COATED ORAL ONCE
Refills: 0 | Status: COMPLETED | OUTPATIENT
Start: 2023-11-17 | End: 2023-11-17

## 2023-11-17 RX ORDER — CEFEPIME 1 G/1
1000 INJECTION, POWDER, FOR SOLUTION INTRAMUSCULAR; INTRAVENOUS ONCE
Refills: 0 | Status: COMPLETED | OUTPATIENT
Start: 2023-11-17 | End: 2023-11-17

## 2023-11-17 RX ORDER — IPRATROPIUM/ALBUTEROL SULFATE 18-103MCG
3 AEROSOL WITH ADAPTER (GRAM) INHALATION ONCE
Refills: 0 | Status: COMPLETED | OUTPATIENT
Start: 2023-11-17 | End: 2023-11-17

## 2023-11-17 RX ADMIN — Medication 3 MILLILITER(S): at 21:53

## 2023-11-17 RX ADMIN — AZITHROMYCIN 255 MILLIGRAM(S): 500 TABLET, FILM COATED ORAL at 21:27

## 2023-11-17 RX ADMIN — Medication 975 MILLIGRAM(S): at 17:58

## 2023-11-17 RX ADMIN — CEFEPIME 100 MILLIGRAM(S): 1 INJECTION, POWDER, FOR SOLUTION INTRAMUSCULAR; INTRAVENOUS at 17:58

## 2023-11-17 NOTE — ED PROVIDER NOTE - ADDITIONAL HISTORY OBTAINED FROM MULTI-SELECT OPTION
Vaccine Information Statement(s) was given today. This has been reviewed, questions answered, and verbal consent given by Patient for injection(s) and administration of Influenza (Inactivated) and Pnemovax    1. Does the patient have a moderate to severe fever?  No  2. Has the patient had a serious reaction to a flu shot before?   No  3. Has the patient ever had [place] Syndrome within 6 weeks of a previous flu shot?  No  4. Is the patient less than 6 months of age?  No    Patient is eligible to receive the vaccine based on all questions being answered as 'No'.    Patient tolerated without incident. See immunization grid for documentation.   Family

## 2023-11-17 NOTE — ED PROVIDER NOTE - CLINICAL SUMMARY MEDICAL DECISION MAKING FREE TEXT BOX
Patient endorsed to me by Dr. Montiel pending cmp and CT.   77 yo male with a pmh of copd and lung ca on chemotherapy presents c/o cough/fever/SOB for 2 days. noted to be hypoxic on rest on my exam, no respiratory distress. wheezing diffusely. labs reviewed. imaging with possible aspiration pneumonia, given iv abx. given treatment for his wheezing. stable on nasal canula. admitted for further management.

## 2023-11-17 NOTE — ED PROVIDER NOTE - OBJECTIVE STATEMENT
77 yo male with a pmh of copd and lung ca on chemotherapy presents c/o cough/fever/SOB for 2 days. tmax 101. pt denies any other symptoms including headache, recent illness/travel, abdominal pain, chest pain.

## 2023-11-17 NOTE — ED ADULT NURSE NOTE - NSFALLUNIVINTERV_ED_ALL_ED
Bed/Stretcher in lowest position, wheels locked, appropriate side rails in place/Call bell, personal items and telephone in reach/Instruct patient to call for assistance before getting out of bed/chair/stretcher/Non-slip footwear applied when patient is off stretcher/[place] to call system/Physically safe environment - no spills, clutter or unnecessary equipment/Purposeful proactive rounding/Room/bathroom lighting operational, light cord in reach

## 2023-11-17 NOTE — ED PROVIDER NOTE - ATTENDING APP SHARED VISIT CONTRIBUTION OF CARE
77 yo m with pmh of lung ca, copd, on chemo q 3 weeks with dr. pemberton (last chemo 2 weeks ago) sent from [facility] for fever and worsening lung infection.  pt says was at [facility] 5 weeks ago for similar sx, no fever, and was dx with L sided pleural effusion.  pt was covered with steroids and augmentin/azithro.  pt says felt better for a few weeks.  symptoms restarted 5 days ago and feels worse.  went to [facility] again and was told his lungs are worse and to go to hospital.  tm at the [facility] was 101.  pt denies cp.  no abd pain, no n/v/d.  poor appetite.  does not f/u with his pmd.  had been treated in the past for cellulitis and says LLE redness and swelling is chronic and recently had multiple duplexes which were neg.  exam: nad, ncat, perrl, eomi, mmm, rrr, rales and dec bs R base, abd soft, nt, nd aox3, no calf tenderness, + pitting edema, LLE erythema and warmth imp: pt with pleural effusion, cough, fever, on chemo, will tx for pna and empiric iv abx, duplex ordered, cxr, labs, and admission

## 2023-11-18 LAB
ALBUMIN SERPL ELPH-MCNC: 2.5 G/DL — LOW (ref 3.5–5.2)
ALBUMIN SERPL ELPH-MCNC: 2.5 G/DL — LOW (ref 3.5–5.2)
ALP SERPL-CCNC: 120 U/L — HIGH (ref 30–115)
ALP SERPL-CCNC: 120 U/L — HIGH (ref 30–115)
ALT FLD-CCNC: 58 U/L — HIGH (ref 0–41)
ALT FLD-CCNC: 58 U/L — HIGH (ref 0–41)
ANION GAP SERPL CALC-SCNC: 14 MMOL/L — SIGNIFICANT CHANGE UP (ref 7–14)
ANION GAP SERPL CALC-SCNC: 14 MMOL/L — SIGNIFICANT CHANGE UP (ref 7–14)
AST SERPL-CCNC: 62 U/L — HIGH (ref 0–41)
AST SERPL-CCNC: 62 U/L — HIGH (ref 0–41)
BASOPHILS # BLD AUTO: 0.03 K/UL — SIGNIFICANT CHANGE UP (ref 0–0.2)
BASOPHILS # BLD AUTO: 0.03 K/UL — SIGNIFICANT CHANGE UP (ref 0–0.2)
BASOPHILS NFR BLD AUTO: 0.3 % — SIGNIFICANT CHANGE UP (ref 0–1)
BASOPHILS NFR BLD AUTO: 0.3 % — SIGNIFICANT CHANGE UP (ref 0–1)
BILIRUB SERPL-MCNC: 0.4 MG/DL — SIGNIFICANT CHANGE UP (ref 0.2–1.2)
BILIRUB SERPL-MCNC: 0.4 MG/DL — SIGNIFICANT CHANGE UP (ref 0.2–1.2)
BUN SERPL-MCNC: 21 MG/DL — HIGH (ref 10–20)
BUN SERPL-MCNC: 21 MG/DL — HIGH (ref 10–20)
CALCIUM SERPL-MCNC: 8.2 MG/DL — LOW (ref 8.4–10.5)
CALCIUM SERPL-MCNC: 8.2 MG/DL — LOW (ref 8.4–10.5)
CHLORIDE SERPL-SCNC: 103 MMOL/L — SIGNIFICANT CHANGE UP (ref 98–110)
CHLORIDE SERPL-SCNC: 103 MMOL/L — SIGNIFICANT CHANGE UP (ref 98–110)
CO2 SERPL-SCNC: 22 MMOL/L — SIGNIFICANT CHANGE UP (ref 17–32)
CO2 SERPL-SCNC: 22 MMOL/L — SIGNIFICANT CHANGE UP (ref 17–32)
CREAT SERPL-MCNC: 0.9 MG/DL — SIGNIFICANT CHANGE UP (ref 0.7–1.5)
CREAT SERPL-MCNC: 0.9 MG/DL — SIGNIFICANT CHANGE UP (ref 0.7–1.5)
EGFR: 86 ML/MIN/1.73M2 — SIGNIFICANT CHANGE UP
EGFR: 86 ML/MIN/1.73M2 — SIGNIFICANT CHANGE UP
EOSINOPHIL # BLD AUTO: 0.05 K/UL — SIGNIFICANT CHANGE UP (ref 0–0.7)
EOSINOPHIL # BLD AUTO: 0.05 K/UL — SIGNIFICANT CHANGE UP (ref 0–0.7)
EOSINOPHIL NFR BLD AUTO: 0.5 % — SIGNIFICANT CHANGE UP (ref 0–8)
EOSINOPHIL NFR BLD AUTO: 0.5 % — SIGNIFICANT CHANGE UP (ref 0–8)
GLUCOSE SERPL-MCNC: 151 MG/DL — HIGH (ref 70–99)
GLUCOSE SERPL-MCNC: 151 MG/DL — HIGH (ref 70–99)
HCT VFR BLD CALC: 29.2 % — LOW (ref 42–52)
HCT VFR BLD CALC: 29.2 % — LOW (ref 42–52)
HGB BLD-MCNC: 9.2 G/DL — LOW (ref 14–18)
HGB BLD-MCNC: 9.2 G/DL — LOW (ref 14–18)
IMM GRANULOCYTES NFR BLD AUTO: 1 % — HIGH (ref 0.1–0.3)
IMM GRANULOCYTES NFR BLD AUTO: 1 % — HIGH (ref 0.1–0.3)
IRON SATN MFR SERPL: 19 % — SIGNIFICANT CHANGE UP (ref 15–50)
IRON SATN MFR SERPL: 19 % — SIGNIFICANT CHANGE UP (ref 15–50)
IRON SATN MFR SERPL: 25 UG/DL — LOW (ref 35–150)
IRON SATN MFR SERPL: 25 UG/DL — LOW (ref 35–150)
LYMPHOCYTES # BLD AUTO: 2.66 K/UL — SIGNIFICANT CHANGE UP (ref 1.2–3.4)
LYMPHOCYTES # BLD AUTO: 2.66 K/UL — SIGNIFICANT CHANGE UP (ref 1.2–3.4)
LYMPHOCYTES # BLD AUTO: 28.3 % — SIGNIFICANT CHANGE UP (ref 20.5–51.1)
LYMPHOCYTES # BLD AUTO: 28.3 % — SIGNIFICANT CHANGE UP (ref 20.5–51.1)
MAGNESIUM SERPL-MCNC: 1.8 MG/DL — SIGNIFICANT CHANGE UP (ref 1.8–2.4)
MAGNESIUM SERPL-MCNC: 1.8 MG/DL — SIGNIFICANT CHANGE UP (ref 1.8–2.4)
MCHC RBC-ENTMCNC: 31.5 G/DL — LOW (ref 32–37)
MCHC RBC-ENTMCNC: 31.5 G/DL — LOW (ref 32–37)
MCHC RBC-ENTMCNC: 31.9 PG — HIGH (ref 27–31)
MCHC RBC-ENTMCNC: 31.9 PG — HIGH (ref 27–31)
MCV RBC AUTO: 101.4 FL — HIGH (ref 80–94)
MCV RBC AUTO: 101.4 FL — HIGH (ref 80–94)
MONOCYTES # BLD AUTO: 1.42 K/UL — HIGH (ref 0.1–0.6)
MONOCYTES # BLD AUTO: 1.42 K/UL — HIGH (ref 0.1–0.6)
MONOCYTES NFR BLD AUTO: 15.1 % — HIGH (ref 1.7–9.3)
MONOCYTES NFR BLD AUTO: 15.1 % — HIGH (ref 1.7–9.3)
NEUTROPHILS # BLD AUTO: 5.14 K/UL — SIGNIFICANT CHANGE UP (ref 1.4–6.5)
NEUTROPHILS # BLD AUTO: 5.14 K/UL — SIGNIFICANT CHANGE UP (ref 1.4–6.5)
NEUTROPHILS NFR BLD AUTO: 54.8 % — SIGNIFICANT CHANGE UP (ref 42.2–75.2)
NEUTROPHILS NFR BLD AUTO: 54.8 % — SIGNIFICANT CHANGE UP (ref 42.2–75.2)
NRBC # BLD: 0 /100 WBCS — SIGNIFICANT CHANGE UP (ref 0–0)
NRBC # BLD: 0 /100 WBCS — SIGNIFICANT CHANGE UP (ref 0–0)
NT-PROBNP SERPL-SCNC: 654 PG/ML — HIGH (ref 0–300)
NT-PROBNP SERPL-SCNC: 654 PG/ML — HIGH (ref 0–300)
PLATELET # BLD AUTO: 128 K/UL — LOW (ref 130–400)
PLATELET # BLD AUTO: 128 K/UL — LOW (ref 130–400)
PMV BLD: 9.4 FL — SIGNIFICANT CHANGE UP (ref 7.4–10.4)
PMV BLD: 9.4 FL — SIGNIFICANT CHANGE UP (ref 7.4–10.4)
POTASSIUM SERPL-MCNC: 4.4 MMOL/L — SIGNIFICANT CHANGE UP (ref 3.5–5)
POTASSIUM SERPL-MCNC: 4.4 MMOL/L — SIGNIFICANT CHANGE UP (ref 3.5–5)
POTASSIUM SERPL-SCNC: 4.4 MMOL/L — SIGNIFICANT CHANGE UP (ref 3.5–5)
POTASSIUM SERPL-SCNC: 4.4 MMOL/L — SIGNIFICANT CHANGE UP (ref 3.5–5)
PROT SERPL-MCNC: 5.9 G/DL — LOW (ref 6–8)
PROT SERPL-MCNC: 5.9 G/DL — LOW (ref 6–8)
RBC # BLD: 2.88 M/UL — LOW (ref 4.7–6.1)
RBC # BLD: 2.88 M/UL — LOW (ref 4.7–6.1)
RBC # FLD: 15.9 % — HIGH (ref 11.5–14.5)
RBC # FLD: 15.9 % — HIGH (ref 11.5–14.5)
SODIUM SERPL-SCNC: 139 MMOL/L — SIGNIFICANT CHANGE UP (ref 135–146)
SODIUM SERPL-SCNC: 139 MMOL/L — SIGNIFICANT CHANGE UP (ref 135–146)
TIBC SERPL-MCNC: 133 UG/DL — LOW (ref 220–430)
TIBC SERPL-MCNC: 133 UG/DL — LOW (ref 220–430)
UIBC SERPL-MCNC: 108 UG/DL — LOW (ref 110–370)
UIBC SERPL-MCNC: 108 UG/DL — LOW (ref 110–370)
WBC # BLD: 9.39 K/UL — SIGNIFICANT CHANGE UP (ref 4.8–10.8)
WBC # BLD: 9.39 K/UL — SIGNIFICANT CHANGE UP (ref 4.8–10.8)
WBC # FLD AUTO: 9.39 K/UL — SIGNIFICANT CHANGE UP (ref 4.8–10.8)
WBC # FLD AUTO: 9.39 K/UL — SIGNIFICANT CHANGE UP (ref 4.8–10.8)

## 2023-11-18 PROCEDURE — 99223 1ST HOSP IP/OBS HIGH 75: CPT

## 2023-11-18 RX ORDER — ENOXAPARIN SODIUM 100 MG/ML
40 INJECTION SUBCUTANEOUS EVERY 24 HOURS
Refills: 0 | Status: DISCONTINUED | OUTPATIENT
Start: 2023-11-18 | End: 2023-11-21

## 2023-11-18 RX ORDER — AZITHROMYCIN 500 MG/1
500 TABLET, FILM COATED ORAL EVERY 24 HOURS
Refills: 0 | Status: DISCONTINUED | OUTPATIENT
Start: 2023-11-18 | End: 2023-11-20

## 2023-11-18 RX ORDER — IPRATROPIUM/ALBUTEROL SULFATE 18-103MCG
3 AEROSOL WITH ADAPTER (GRAM) INHALATION EVERY 4 HOURS
Refills: 0 | Status: DISCONTINUED | OUTPATIENT
Start: 2023-11-18 | End: 2023-11-21

## 2023-11-18 RX ORDER — DOCUSATE SODIUM 100 MG
1 CAPSULE ORAL
Qty: 0 | Refills: 0 | DISCHARGE

## 2023-11-18 RX ORDER — FOLIC ACID 0.8 MG
1 TABLET ORAL DAILY
Refills: 0 | Status: DISCONTINUED | OUTPATIENT
Start: 2023-11-18 | End: 2023-11-21

## 2023-11-18 RX ORDER — LANOLIN ALCOHOL/MO/W.PET/CERES
3 CREAM (GRAM) TOPICAL AT BEDTIME
Refills: 0 | Status: DISCONTINUED | OUTPATIENT
Start: 2023-11-18 | End: 2023-11-21

## 2023-11-18 RX ORDER — PANTOPRAZOLE SODIUM 20 MG/1
40 TABLET, DELAYED RELEASE ORAL
Refills: 0 | Status: DISCONTINUED | OUTPATIENT
Start: 2023-11-18 | End: 2023-11-21

## 2023-11-18 RX ORDER — POLYETHYLENE GLYCOL 3350 17 G/17G
17 POWDER, FOR SOLUTION ORAL DAILY
Refills: 0 | Status: DISCONTINUED | OUTPATIENT
Start: 2023-11-18 | End: 2023-11-21

## 2023-11-18 RX ORDER — BUDESONIDE AND FORMOTEROL FUMARATE DIHYDRATE 160; 4.5 UG/1; UG/1
2 AEROSOL RESPIRATORY (INHALATION)
Qty: 0 | Refills: 0 | DISCHARGE

## 2023-11-18 RX ORDER — ACETAMINOPHEN 500 MG
650 TABLET ORAL EVERY 6 HOURS
Refills: 0 | Status: DISCONTINUED | OUTPATIENT
Start: 2023-11-18 | End: 2023-11-20

## 2023-11-18 RX ORDER — MULTIVIT-MIN/FERROUS GLUCONATE 9 MG/15 ML
1 LIQUID (ML) ORAL DAILY
Refills: 0 | Status: DISCONTINUED | OUTPATIENT
Start: 2023-11-18 | End: 2023-11-21

## 2023-11-18 RX ORDER — FUROSEMIDE 40 MG
20 TABLET ORAL DAILY
Refills: 0 | Status: DISCONTINUED | OUTPATIENT
Start: 2023-11-18 | End: 2023-11-21

## 2023-11-18 RX ORDER — MULTIVIT-MIN/FERROUS GLUCONATE 9 MG/15 ML
1 LIQUID (ML) ORAL
Refills: 0 | DISCHARGE

## 2023-11-18 RX ORDER — FOLIC ACID 0.8 MG
1 TABLET ORAL
Refills: 0 | DISCHARGE

## 2023-11-18 RX ORDER — SENNA PLUS 8.6 MG/1
2 TABLET ORAL AT BEDTIME
Refills: 0 | Status: DISCONTINUED | OUTPATIENT
Start: 2023-11-18 | End: 2023-11-21

## 2023-11-18 RX ORDER — BUDESONIDE AND FORMOTEROL FUMARATE DIHYDRATE 160; 4.5 UG/1; UG/1
2 AEROSOL RESPIRATORY (INHALATION)
Refills: 0 | Status: DISCONTINUED | OUTPATIENT
Start: 2023-11-18 | End: 2023-11-21

## 2023-11-18 RX ORDER — IPRATROPIUM/ALBUTEROL SULFATE 18-103MCG
3 AEROSOL WITH ADAPTER (GRAM) INHALATION EVERY 6 HOURS
Refills: 0 | Status: DISCONTINUED | OUTPATIENT
Start: 2023-11-18 | End: 2023-11-21

## 2023-11-18 RX ORDER — ONDANSETRON 8 MG/1
4 TABLET, FILM COATED ORAL EVERY 8 HOURS
Refills: 0 | Status: DISCONTINUED | OUTPATIENT
Start: 2023-11-18 | End: 2023-11-21

## 2023-11-18 RX ORDER — CHOLECALCIFEROL (VITAMIN D3) 125 MCG
2000 CAPSULE ORAL DAILY
Refills: 0 | Status: DISCONTINUED | OUTPATIENT
Start: 2023-11-18 | End: 2023-11-21

## 2023-11-18 RX ORDER — ERGOCALCIFEROL 1.25 MG/1
1 CAPSULE ORAL
Qty: 0 | Refills: 0 | DISCHARGE

## 2023-11-18 RX ORDER — SIMVASTATIN 20 MG/1
10 TABLET, FILM COATED ORAL AT BEDTIME
Refills: 0 | Status: DISCONTINUED | OUTPATIENT
Start: 2023-11-18 | End: 2023-11-20

## 2023-11-18 RX ORDER — ALBUTEROL 90 UG/1
2 AEROSOL, METERED ORAL
Refills: 0 | DISCHARGE

## 2023-11-18 RX ORDER — CEFEPIME 1 G/1
2000 INJECTION, POWDER, FOR SOLUTION INTRAMUSCULAR; INTRAVENOUS EVERY 8 HOURS
Refills: 0 | Status: DISCONTINUED | OUTPATIENT
Start: 2023-11-18 | End: 2023-11-20

## 2023-11-18 RX ADMIN — Medication 1 MILLIGRAM(S): at 13:37

## 2023-11-18 RX ADMIN — Medication 3 MILLILITER(S): at 20:28

## 2023-11-18 RX ADMIN — PANTOPRAZOLE SODIUM 40 MILLIGRAM(S): 20 TABLET, DELAYED RELEASE ORAL at 05:34

## 2023-11-18 RX ADMIN — CEFEPIME 100 MILLIGRAM(S): 1 INJECTION, POWDER, FOR SOLUTION INTRAMUSCULAR; INTRAVENOUS at 05:33

## 2023-11-18 RX ADMIN — Medication 2000 UNIT(S): at 13:37

## 2023-11-18 RX ADMIN — Medication 20 MILLIGRAM(S): at 05:33

## 2023-11-18 RX ADMIN — Medication 3 MILLILITER(S): at 17:08

## 2023-11-18 RX ADMIN — CEFEPIME 100 MILLIGRAM(S): 1 INJECTION, POWDER, FOR SOLUTION INTRAMUSCULAR; INTRAVENOUS at 13:38

## 2023-11-18 RX ADMIN — Medication 3 MILLILITER(S): at 04:53

## 2023-11-18 RX ADMIN — CEFEPIME 100 MILLIGRAM(S): 1 INJECTION, POWDER, FOR SOLUTION INTRAMUSCULAR; INTRAVENOUS at 23:43

## 2023-11-18 RX ADMIN — Medication 1 TABLET(S): at 13:39

## 2023-11-18 RX ADMIN — AZITHROMYCIN 255 MILLIGRAM(S): 500 TABLET, FILM COATED ORAL at 13:38

## 2023-11-18 RX ADMIN — Medication 60 MILLIGRAM(S): at 05:34

## 2023-11-18 RX ADMIN — SIMVASTATIN 10 MILLIGRAM(S): 20 TABLET, FILM COATED ORAL at 22:27

## 2023-11-18 RX ADMIN — Medication 600 MILLIGRAM(S): at 17:38

## 2023-11-18 NOTE — H&P ADULT - HISTORY OF PRESENT ILLNESS
Pt is a 79 y/o M PMHx of COPD, lung ca on chemotherapy not on home O2, HLD, LE edema presents c/o cough/fever/SOB for 2 days. Pt reports a Tmax 101F at home. States he went to  who advised him to come to ED. Denies headache, recent illness/travel, abdominal pain, and chest pain.    In ED vitals /78 HR 83 RR 22 T98F sat 94% on 2L NC    CTA PE Protocol w/ IV Cont 1. No acute pulmonary emboli.  2. Evidence of small airways infection/inflammation in bilateral posterior upper lobes and posterior lower lobes, correlate for history of aspiration.  3. Improved mediastinal adenopathy.  4. Unchanged bilateral hilar adenopathy and left anterior upper lobe nodule.    VBG unremarkable    Admitted for COPD exacerbation/ PNA Pt is a 77 y/o M PMHx of COPD, lung ca on chemotherapy not on home O2, HLD, LE edema presents c/o cough/fever/SOB for 2 days. Pt reports a Tmax 101F at home. States he went to  who advised him to come to ED. Denies headache, recent illness/travel, abdominal pain, and chest pain.    In ED vitals /78 HR 83 RR 22 T98F sat 94% on 2L NC    CTA PE Protocol w/ IV Cont 1. No acute pulmonary emboli.  2. Evidence of small airways infection/inflammation in bilateral posterior upper lobes and posterior lower lobes, correlate for history of aspiration.  3. Improved mediastinal adenopathy.  4. Unchanged bilateral hilar adenopathy and left anterior upper lobe nodule.    VBG unremarkable    Admitted for COPD exacerbation/ PNA

## 2023-11-18 NOTE — H&P ADULT - ASSESSMENT
Pt is a 79 y/o M PMHx of COPD, lung ca on chemotherapy not on home O2, HLD, LE edema presents c/o cough/fever/SOB for 2 days.      #Aspiration PNA  #Acute Hypoxic Respiratory Failure likely secondary to Acute COPD Exacerbation   - RVP - negative for Covid/flu/rsv - FU FULL panel  - CTA chest noted  - Pulse ox q8 hrs  - Nebs q 6 hrs and q4h PRN + Symbicort  - Solumedrol 60 BID  - Supplemental Oxygen PRN, titrate to SPO2 goal 88-92%   - f/u MRSA PCR  - FU Procal  - C/w Cefepime +  Azithro  - f/u UA and BCx       #Lung Ca  - Oncologist Dr Malcolm - next chemo scheduled 11/30      #HLD  - C/w home meds      # Misc  - DVT Prophylaxis: enoxaparin Injectable  - GI Prophylaxis: Protonix  - Diet: Diet, DASH/TLC  - Dispo: Acute      Med rec confirmed with patient Pt is a 77 y/o M PMHx of COPD, lung ca on chemotherapy not on home O2, HLD, LE edema presents c/o cough/fever/SOB for 2 days.      #Aspiration PNA  #Acute Hypoxic Respiratory Failure likely secondary to Acute COPD Exacerbation   - RVP - negative for Covid/flu/rsv - FU FULL panel  - CTA chest noted  - Pulse ox q8 hrs  - Nebs q 6 hrs and q4h PRN + Symbicort  - Solumedrol 60 BID  - Supplemental Oxygen PRN, titrate to SPO2 goal 88-92%   - f/u MRSA PCR  - FU Procal  - C/w Cefepime +  Azithro  - f/u UA and BCx       #Lung Ca  - Oncologist Dr Malcolm - next chemo scheduled 11/30      #HLD  - C/w home meds      # Misc  - DVT Prophylaxis: enoxaparin Injectable  - GI Prophylaxis: Protonix  - Diet: Diet, DASH/TLC  - Dispo: Acute      Med rec confirmed with patient

## 2023-11-18 NOTE — H&P ADULT - NSHPPHYSICALEXAM_GEN_ALL_CORE
T(C): 36.9 (11-17-23 @ 23:58), Max: 37.1 (11-17-23 @ 13:32)  HR: 97 (11-17-23 @ 23:58) (83 - 97)  BP: 115/61 (11-17-23 @ 23:58) (115/61 - 117/78)  RR: 22 (11-17-23 @ 13:32) (22 - 22)  SpO2: 95% (11-17-23 @ 23:58) (94% - 95%)    CONSTITUTIONAL: NAD, 2L NC  HEENT: AT, NC  RESP: No respiratory distress, no use of accessory muscles, poor airway entry b/l,   CV: RRR, +S1S2, +2 LE edema  GI: Soft, NT, ND, no rebound, no guarding  MSK: Normal muscle strength/tone  SKIN: LE chronic venous stasis changes  NEURO: Sensation intact in upper and lower extremities b/l to light touch   PSYCH: AAOx3

## 2023-11-18 NOTE — H&P ADULT - ATTENDING COMMENTS
Pt is a 77 y/o M PMHx of COPD, lung ca on chemotherapy not on home O2, HLD, LE edema presents c/o cough/fever/SOB for 2 days.      1. Acute Hypoxic Respiratory Failure likely secondary to Acute COPD Exacerbation and aspiration pneumonia   - Admit to medicine                                    - RVP: negative                  - Cont cefepime and azithromycin   - Sputum cx:pending               - Urine leg:pending             - Urine strep:pending          - MRSA:pending   - Procalcitonin:pending   - Speech and swallow:pending   - Pulse ox q8 hrs  - Nebs q 6 hrs and q4h PRN + Symbicort  - Solumedrol 60 BID  - Supplemental Oxygen PRN, titrate to SPO2 goal 88-92%   - Mucinex bid   - CT angio chest:  a) No acute pulmonary emboli.  b) Evidence of small airways infection/inflammation in bilateral posterior upper lobes and posterior lower lobes, correlate for history of aspiration.  c) Improved mediastinal adenopathy.  d)  Unchanged bilateral hilar adenopathy and left anterior upper lobe nodule.    2. Lung Ca  - Oncologist Dr Malcolm - next chemo scheduled 11/30      3. HLD  - C/w home meds      # Misc  - DVT Prophylaxis: enoxaparin Injectable  - GI Prophylaxis: Protonix  - Diet: Diet, DASH/TLC  - Dispo: Acute      Med rec confirmed with patient Pt is a 79 y/o M PMHx of COPD, lung ca on chemotherapy not on home O2, HLD, LE edema presents c/o cough/fever/SOB for 2 days.      1. Acute Hypoxic Respiratory Failure likely secondary to Acute COPD Exacerbation and aspiration pneumonia   - Admit to medicine                                    - RVP: negative                  - Cont cefepime and azithromycin   - Sputum cx:pending               - Urine leg:pending             - Urine strep:pending          - MRSA:pending   - Procalcitonin:pending   - Speech and swallow:pending   - Pulse ox q8 hrs  - Nebs q 6 hrs and q4h PRN + Symbicort  - Solumedrol 60 BID  - Supplemental Oxygen PRN, titrate to SPO2 goal 88-92%   - Mucinex bid   - CT angio chest:  a) No acute pulmonary emboli.  b) Evidence of small airways infection/inflammation in bilateral posterior upper lobes and posterior lower lobes, correlate for history of aspiration.  c) Improved mediastinal adenopathy.  d)  Unchanged bilateral hilar adenopathy and left anterior upper lobe nodule.    2. Lung Ca  - Oncologist Dr Malcolm - next chemo scheduled 11/30      3. HLD  - C/w home meds      # Misc  - DVT Prophylaxis: enoxaparin Injectable  - GI Prophylaxis: Protonix  - Diet: Diet, DASH/TLC  - Dispo: Acute      Med rec confirmed with patient Pt is a 77 y/o M PMHx of COPD, lung ca on chemotherapy not on home O2, HLD, LE edema presents c/o cough/fever/SOB for 2 days.      1. Acute Hypoxic Respiratory Failure likely secondary to Acute COPD Exacerbation and aspiration pneumonia   - Admit to medicine                                    - RVP: negative                  - Cont cefepime and azithromycin   - Sputum cx:pending               - Urine leg:pending             - Urine strep:pending          - MRSA:pending   - Procalcitonin:pending   - Speech and swallow:pending   - Pulse ox q8 hrs  - Nebs q 6 hrs and q4h PRN + Symbicort  - Solumedrol 60 daily   - Supplemental Oxygen PRN, titrate to SPO2 goal 88-92%   - Mucinex bid   - CT angio chest:  a) No acute pulmonary emboli.  b) Evidence of small airways infection/inflammation in bilateral posterior upper lobes and posterior lower lobes, correlate for history of aspiration.  c) Improved mediastinal adenopathy.  d)  Unchanged bilateral hilar adenopathy and left anterior upper lobe nodule.    2. Lung Ca  - Oncologist Dr Malcolm - next chemo scheduled 11/30      3. HLD  - C/w home meds      # Misc  - DVT Prophylaxis: enoxaparin Injectable  - GI Prophylaxis: Protonix  - Diet: Diet, DASH/TLC  - Dispo: Acute      Med rec confirmed with patient Pt is a 79 y/o M PMHx of COPD, lung ca on chemotherapy not on home O2, HLD, LE edema presents c/o cough/fever/SOB for 2 days.      1. Acute Hypoxic Respiratory Failure likely secondary to Acute COPD Exacerbation and aspiration pneumonia   - Admit to medicine                                    - RVP: negative                  - Cont cefepime and azithromycin   - Sputum cx:pending               - Urine leg:pending             - Urine strep:pending          - MRSA:pending   - Procalcitonin:pending   - Speech and swallow:pending   - Pulse ox q8 hrs  - Nebs q 6 hrs and q4h PRN + Symbicort  - Solumedrol 60 daily   - Supplemental Oxygen PRN, titrate to SPO2 goal 88-92%   - Mucinex bid   - CT angio chest:  a) No acute pulmonary emboli.  b) Evidence of small airways infection/inflammation in bilateral posterior upper lobes and posterior lower lobes, correlate for history of aspiration.  c) Improved mediastinal adenopathy.  d)  Unchanged bilateral hilar adenopathy and left anterior upper lobe nodule.    2. Lung Ca  - Oncologist Dr Malcolm - next chemo scheduled 11/30      3. HLD  - C/w home meds      # Misc  - DVT Prophylaxis: enoxaparin Injectable  - GI Prophylaxis: Protonix  - Diet: Diet, DASH/TLC  - Dispo: Acute      Med rec confirmed with patient Pt is a 77 y/o M PMHx of COPD, lung ca on chemotherapy not on home O2, HLD, LE edema presents c/o cough/fever/SOB for 2 days.      1. Acute Hypoxic Respiratory Failure likely secondary to Acute COPD Exacerbation and aspiration pneumonia   - Admit to medicine                                    - RVP: negative                  - Cont cefepime and azithromycin   - Sputum cx:pending               - Urine leg:pending             - Urine strep:pending          - MRSA:pending   - Procalcitonin:pending   - Speech and swallow:pending   - Pulse ox q8 hrs  - Nebs q 6 hrs and q4h PRN + Symbicort  - Solumedrol 60 daily   - Supplemental Oxygen PRN, titrate to SPO2 goal 88-92%   - Mucinex bid   - CT angio chest:  a) No acute pulmonary emboli.  b) Evidence of small airways infection/inflammation in bilateral posterior upper lobes and posterior lower lobes, correlate for history of aspiration.  c) Improved mediastinal adenopathy.  d)  Unchanged bilateral hilar adenopathy and left anterior upper lobe nodule.    2. Lung Ca  - Oncologist Dr Malcolm - next chemo scheduled 11/30      3. HLD  - C/w home meds    4. Thrombocytopenia seems chronic   - Observe     5. macrocytic anemia with elevated RDW  - iron profile:pending         - vit b12:pending         - Folate:pending       # Misc  - DVT Prophylaxis: enoxaparin Injectable  - GI Prophylaxis: Protonix  - Diet: Diet, DASH/TLC  - Dispo: Acute      Med rec confirmed with patient Pt is a 79 y/o M PMHx of COPD, lung ca on chemotherapy not on home O2, HLD, LE edema presents c/o cough/fever/SOB for 2 days.      1. Acute Hypoxic Respiratory Failure likely secondary to Acute COPD Exacerbation and aspiration pneumonia   - Admit to medicine                                    - RVP: negative                  - Cont cefepime and azithromycin   - Sputum cx:pending               - Urine leg:pending             - Urine strep:pending          - MRSA:pending   - Procalcitonin:pending   - Speech and swallow:pending   - Pulse ox q8 hrs  - Nebs q 6 hrs and q4h PRN + Symbicort  - Solumedrol 60 daily   - Supplemental Oxygen PRN, titrate to SPO2 goal 88-92%   - Mucinex bid   - CT angio chest:  a) No acute pulmonary emboli.  b) Evidence of small airways infection/inflammation in bilateral posterior upper lobes and posterior lower lobes, correlate for history of aspiration.  c) Improved mediastinal adenopathy.  d)  Unchanged bilateral hilar adenopathy and left anterior upper lobe nodule.    2. Lung Ca  - Oncologist Dr Malcolm - next chemo scheduled 11/30      3. HLD  - C/w home meds    4. Thrombocytopenia seems chronic   - Observe     5. macrocytic anemia with elevated RDW  - iron profile:pending         - vit b12:pending         - Folate:pending       # Misc  - DVT Prophylaxis: enoxaparin Injectable  - GI Prophylaxis: Protonix  - Diet: Diet, DASH/TLC  - Dispo: Acute      Med rec confirmed with patient

## 2023-11-18 NOTE — SWALLOW BEDSIDE ASSESSMENT ADULT - SLP PERTINENT HISTORY OF CURRENT PROBLEM
Pt is a 79 y/o M PMHx of COPD, lung ca on chemotherapy not on home O2, HLD, LE edema presents c/o cough/fever/SOB for 2 days. Pt reports a Tmax 101F at home. States he went to  who advised him to come to ED. Denies headache, recent illness/travel, abdominal pain, and chest pain. CXR 11/17/23: "Lungs/ Pleura: Interval development left basilar opacity with small effusion. No pneumothorax"

## 2023-11-18 NOTE — H&P ADULT - NSICDXNOFAMILYHX_GEN_ALL_CORE
I have reviewed this patient and I concur with the Shift Assessment completed
by the Licensed Practical Nurse today this shift. <-- Click to add NO pertinent Family History

## 2023-11-19 LAB
ALBUMIN SERPL ELPH-MCNC: 2.7 G/DL — LOW (ref 3.5–5.2)
ALBUMIN SERPL ELPH-MCNC: 2.7 G/DL — LOW (ref 3.5–5.2)
ALP SERPL-CCNC: 135 U/L — HIGH (ref 30–115)
ALP SERPL-CCNC: 135 U/L — HIGH (ref 30–115)
ALT FLD-CCNC: 155 U/L — HIGH (ref 0–41)
ALT FLD-CCNC: 155 U/L — HIGH (ref 0–41)
ANION GAP SERPL CALC-SCNC: 14 MMOL/L — SIGNIFICANT CHANGE UP (ref 7–14)
ANION GAP SERPL CALC-SCNC: 14 MMOL/L — SIGNIFICANT CHANGE UP (ref 7–14)
AST SERPL-CCNC: 163 U/L — HIGH (ref 0–41)
AST SERPL-CCNC: 163 U/L — HIGH (ref 0–41)
BASOPHILS # BLD AUTO: 0.01 K/UL — SIGNIFICANT CHANGE UP (ref 0–0.2)
BASOPHILS # BLD AUTO: 0.01 K/UL — SIGNIFICANT CHANGE UP (ref 0–0.2)
BASOPHILS NFR BLD AUTO: 0.1 % — SIGNIFICANT CHANGE UP (ref 0–1)
BASOPHILS NFR BLD AUTO: 0.1 % — SIGNIFICANT CHANGE UP (ref 0–1)
BILIRUB SERPL-MCNC: <0.2 MG/DL — SIGNIFICANT CHANGE UP (ref 0.2–1.2)
BILIRUB SERPL-MCNC: <0.2 MG/DL — SIGNIFICANT CHANGE UP (ref 0.2–1.2)
BUN SERPL-MCNC: 25 MG/DL — HIGH (ref 10–20)
BUN SERPL-MCNC: 25 MG/DL — HIGH (ref 10–20)
CALCIUM SERPL-MCNC: 8.4 MG/DL — SIGNIFICANT CHANGE UP (ref 8.4–10.5)
CALCIUM SERPL-MCNC: 8.4 MG/DL — SIGNIFICANT CHANGE UP (ref 8.4–10.5)
CHLORIDE SERPL-SCNC: 107 MMOL/L — SIGNIFICANT CHANGE UP (ref 98–110)
CHLORIDE SERPL-SCNC: 107 MMOL/L — SIGNIFICANT CHANGE UP (ref 98–110)
CO2 SERPL-SCNC: 21 MMOL/L — SIGNIFICANT CHANGE UP (ref 17–32)
CO2 SERPL-SCNC: 21 MMOL/L — SIGNIFICANT CHANGE UP (ref 17–32)
CREAT SERPL-MCNC: 0.9 MG/DL — SIGNIFICANT CHANGE UP (ref 0.7–1.5)
CREAT SERPL-MCNC: 0.9 MG/DL — SIGNIFICANT CHANGE UP (ref 0.7–1.5)
EGFR: 86 ML/MIN/1.73M2 — SIGNIFICANT CHANGE UP
EGFR: 86 ML/MIN/1.73M2 — SIGNIFICANT CHANGE UP
EOSINOPHIL # BLD AUTO: 0.01 K/UL — SIGNIFICANT CHANGE UP (ref 0–0.7)
EOSINOPHIL # BLD AUTO: 0.01 K/UL — SIGNIFICANT CHANGE UP (ref 0–0.7)
EOSINOPHIL NFR BLD AUTO: 0.1 % — SIGNIFICANT CHANGE UP (ref 0–8)
EOSINOPHIL NFR BLD AUTO: 0.1 % — SIGNIFICANT CHANGE UP (ref 0–8)
FERRITIN SERPL-MCNC: 1560 NG/ML — HIGH (ref 30–400)
FERRITIN SERPL-MCNC: 1560 NG/ML — HIGH (ref 30–400)
FOLATE SERPL-MCNC: >20 NG/ML — SIGNIFICANT CHANGE UP
FOLATE SERPL-MCNC: >20 NG/ML — SIGNIFICANT CHANGE UP
GLUCOSE SERPL-MCNC: 137 MG/DL — HIGH (ref 70–99)
GLUCOSE SERPL-MCNC: 137 MG/DL — HIGH (ref 70–99)
HCT VFR BLD CALC: 28.1 % — LOW (ref 42–52)
HCT VFR BLD CALC: 28.1 % — LOW (ref 42–52)
HGB BLD-MCNC: 8.8 G/DL — LOW (ref 14–18)
HGB BLD-MCNC: 8.8 G/DL — LOW (ref 14–18)
IMM GRANULOCYTES NFR BLD AUTO: 1 % — HIGH (ref 0.1–0.3)
IMM GRANULOCYTES NFR BLD AUTO: 1 % — HIGH (ref 0.1–0.3)
LYMPHOCYTES # BLD AUTO: 2.03 K/UL — SIGNIFICANT CHANGE UP (ref 1.2–3.4)
LYMPHOCYTES # BLD AUTO: 2.03 K/UL — SIGNIFICANT CHANGE UP (ref 1.2–3.4)
LYMPHOCYTES # BLD AUTO: 24.4 % — SIGNIFICANT CHANGE UP (ref 20.5–51.1)
LYMPHOCYTES # BLD AUTO: 24.4 % — SIGNIFICANT CHANGE UP (ref 20.5–51.1)
MAGNESIUM SERPL-MCNC: 2.1 MG/DL — SIGNIFICANT CHANGE UP (ref 1.8–2.4)
MAGNESIUM SERPL-MCNC: 2.1 MG/DL — SIGNIFICANT CHANGE UP (ref 1.8–2.4)
MCHC RBC-ENTMCNC: 31 PG — SIGNIFICANT CHANGE UP (ref 27–31)
MCHC RBC-ENTMCNC: 31 PG — SIGNIFICANT CHANGE UP (ref 27–31)
MCHC RBC-ENTMCNC: 31.3 G/DL — LOW (ref 32–37)
MCHC RBC-ENTMCNC: 31.3 G/DL — LOW (ref 32–37)
MCV RBC AUTO: 98.9 FL — HIGH (ref 80–94)
MCV RBC AUTO: 98.9 FL — HIGH (ref 80–94)
MONOCYTES # BLD AUTO: 1.17 K/UL — HIGH (ref 0.1–0.6)
MONOCYTES # BLD AUTO: 1.17 K/UL — HIGH (ref 0.1–0.6)
MONOCYTES NFR BLD AUTO: 14.1 % — HIGH (ref 1.7–9.3)
MONOCYTES NFR BLD AUTO: 14.1 % — HIGH (ref 1.7–9.3)
NEUTROPHILS # BLD AUTO: 5.02 K/UL — SIGNIFICANT CHANGE UP (ref 1.4–6.5)
NEUTROPHILS # BLD AUTO: 5.02 K/UL — SIGNIFICANT CHANGE UP (ref 1.4–6.5)
NEUTROPHILS NFR BLD AUTO: 60.3 % — SIGNIFICANT CHANGE UP (ref 42.2–75.2)
NEUTROPHILS NFR BLD AUTO: 60.3 % — SIGNIFICANT CHANGE UP (ref 42.2–75.2)
NRBC # BLD: 0 /100 WBCS — SIGNIFICANT CHANGE UP (ref 0–0)
NRBC # BLD: 0 /100 WBCS — SIGNIFICANT CHANGE UP (ref 0–0)
PLATELET # BLD AUTO: 126 K/UL — LOW (ref 130–400)
PLATELET # BLD AUTO: 126 K/UL — LOW (ref 130–400)
PMV BLD: 9.4 FL — SIGNIFICANT CHANGE UP (ref 7.4–10.4)
PMV BLD: 9.4 FL — SIGNIFICANT CHANGE UP (ref 7.4–10.4)
POTASSIUM SERPL-MCNC: 4.1 MMOL/L — SIGNIFICANT CHANGE UP (ref 3.5–5)
POTASSIUM SERPL-MCNC: 4.1 MMOL/L — SIGNIFICANT CHANGE UP (ref 3.5–5)
POTASSIUM SERPL-SCNC: 4.1 MMOL/L — SIGNIFICANT CHANGE UP (ref 3.5–5)
POTASSIUM SERPL-SCNC: 4.1 MMOL/L — SIGNIFICANT CHANGE UP (ref 3.5–5)
PROCALCITONIN SERPL-MCNC: 0.19 NG/ML — HIGH (ref 0.02–0.1)
PROCALCITONIN SERPL-MCNC: 0.19 NG/ML — HIGH (ref 0.02–0.1)
PROT SERPL-MCNC: 5.7 G/DL — LOW (ref 6–8)
PROT SERPL-MCNC: 5.7 G/DL — LOW (ref 6–8)
RBC # BLD: 2.84 M/UL — LOW (ref 4.7–6.1)
RBC # BLD: 2.84 M/UL — LOW (ref 4.7–6.1)
RBC # FLD: 15.7 % — HIGH (ref 11.5–14.5)
RBC # FLD: 15.7 % — HIGH (ref 11.5–14.5)
SODIUM SERPL-SCNC: 142 MMOL/L — SIGNIFICANT CHANGE UP (ref 135–146)
SODIUM SERPL-SCNC: 142 MMOL/L — SIGNIFICANT CHANGE UP (ref 135–146)
VIT B12 SERPL-MCNC: 1048 PG/ML — SIGNIFICANT CHANGE UP (ref 232–1245)
VIT B12 SERPL-MCNC: 1048 PG/ML — SIGNIFICANT CHANGE UP (ref 232–1245)
WBC # BLD: 8.32 K/UL — SIGNIFICANT CHANGE UP (ref 4.8–10.8)
WBC # BLD: 8.32 K/UL — SIGNIFICANT CHANGE UP (ref 4.8–10.8)
WBC # FLD AUTO: 8.32 K/UL — SIGNIFICANT CHANGE UP (ref 4.8–10.8)
WBC # FLD AUTO: 8.32 K/UL — SIGNIFICANT CHANGE UP (ref 4.8–10.8)

## 2023-11-19 PROCEDURE — 99232 SBSQ HOSP IP/OBS MODERATE 35: CPT

## 2023-11-19 PROCEDURE — 71045 X-RAY EXAM CHEST 1 VIEW: CPT | Mod: 26

## 2023-11-19 RX ADMIN — Medication 600 MILLIGRAM(S): at 17:57

## 2023-11-19 RX ADMIN — Medication 20 MILLIGRAM(S): at 06:00

## 2023-11-19 RX ADMIN — Medication 3 MILLILITER(S): at 21:06

## 2023-11-19 RX ADMIN — Medication 3 MILLILITER(S): at 08:39

## 2023-11-19 RX ADMIN — AZITHROMYCIN 255 MILLIGRAM(S): 500 TABLET, FILM COATED ORAL at 13:09

## 2023-11-19 RX ADMIN — Medication 1 TABLET(S): at 13:08

## 2023-11-19 RX ADMIN — CEFEPIME 100 MILLIGRAM(S): 1 INJECTION, POWDER, FOR SOLUTION INTRAMUSCULAR; INTRAVENOUS at 14:29

## 2023-11-19 RX ADMIN — Medication 1 MILLIGRAM(S): at 13:08

## 2023-11-19 RX ADMIN — Medication 600 MILLIGRAM(S): at 06:00

## 2023-11-19 RX ADMIN — CEFEPIME 100 MILLIGRAM(S): 1 INJECTION, POWDER, FOR SOLUTION INTRAMUSCULAR; INTRAVENOUS at 22:43

## 2023-11-19 RX ADMIN — Medication 60 MILLIGRAM(S): at 06:01

## 2023-11-19 RX ADMIN — Medication 3 MILLILITER(S): at 14:30

## 2023-11-19 RX ADMIN — Medication 2000 UNIT(S): at 13:08

## 2023-11-19 RX ADMIN — SIMVASTATIN 10 MILLIGRAM(S): 20 TABLET, FILM COATED ORAL at 22:44

## 2023-11-19 RX ADMIN — CEFEPIME 100 MILLIGRAM(S): 1 INJECTION, POWDER, FOR SOLUTION INTRAMUSCULAR; INTRAVENOUS at 06:02

## 2023-11-19 NOTE — PROGRESS NOTE ADULT - ASSESSMENT
Pt is a 77 y/o M PMHx of COPD, lung ca on chemotherapy not on home O2, HLD, LE edema presents c/o cough/fever/SOB for 2 days.      # Acute Hypoxic Respiratory Failure likely secondary to Acute COPD Exacerbation and aspiration pneumonia   - He desaturated to 88% on ambulation (he is not chronic hypercapnic). Will continue IV steroids  - Admit to medicine                             - Cont cefepime and azithromycin   - Procalcitonin: 0.19  - Nebs q 6 hrs and q4h PRN + Symbicort  - Solumedrol 60 daily   - Mucinex bid   - CT angio chest: Evidence of small airways infection/inflammation in bilateral posterior upper lobes and posterior lower lobes, correlate for history of aspiration.    # Increasing elevated LFTs  - monitor, check US RUQ    # Lung Ca  - Oncologist Dr Malcolm - next chemo scheduled 11/30      #. HLD  - C/w home meds    #. Thrombocytopenia seems chronic   - Observe     # macrocytic anemia with elevated RDW  - iron profile: anemia of chronic disease  - vit b12:normal - Folat: normal      # Misc  - DVT Prophylaxis: enoxaparin Injectable  - GI Prophylaxis: Protonix  - Diet: Diet, DASH/TLC  - Dispo: Acute    Pending: improvement in oxygenation, continue IV steroids, continue IV abx

## 2023-11-19 NOTE — PROGRESS NOTE ADULT - SUBJECTIVE AND OBJECTIVE BOX
Hospital Day:  2d    Subjective:    Patient is a 78y old  Male who presents with a chief complaint of Shortness of breath (18 Nov 2023 04:53)    This morning patient is lying in bed comfortably. he reports no new complaints, including SOB, chest pain, abdominal pain, nausea, vomiting, dysuria, constipation, or diarrhea.      Past Medical Hx:   COPD, mild    Smoker    Melanoma of skin    Basal cell carcinoma    Lung cancer    Status post pneumothorax    Pneumothorax, post biopsy, left      Past Sx:  No significant past surgical history    H/O carpal tunnel repair    H/O basal cell carcinoma excision    H/O melanoma excision    After cataract, bilateral    S/P chest tube placement      Allergies:  No Known Allergies    Current Meds:   Standng Meds:  albuterol/ipratropium for Nebulization 3 milliLiter(s) Nebulizer every 6 hours  azithromycin  IVPB 500 milliGRAM(s) IV Intermittent every 24 hours  budesonide 160 MICROgram(s)/formoterol 4.5 MICROgram(s) Inhaler 2 Puff(s) Inhalation two times a day  cefepime   IVPB 2000 milliGRAM(s) IV Intermittent every 8 hours  cholecalciferol 2000 Unit(s) Oral daily  enoxaparin Injectable 40 milliGRAM(s) SubCutaneous every 24 hours  folic acid 1 milliGRAM(s) Oral daily  furosemide    Tablet 20 milliGRAM(s) Oral daily  guaiFENesin  milliGRAM(s) Oral every 12 hours  methylPREDNISolone sodium succinate Injectable 60 milliGRAM(s) IV Push every 24 hours  multivitamin/minerals 1 Tablet(s) Oral daily  pantoprazole    Tablet 40 milliGRAM(s) Oral before breakfast  polyethylene glycol 3350 17 Gram(s) Oral daily  senna 2 Tablet(s) Oral at bedtime  simvastatin 10 milliGRAM(s) Oral at bedtime    PRN Meds:  acetaminophen     Tablet .. 650 milliGRAM(s) Oral every 6 hours PRN Temp greater or equal to 38C (100.4F), Mild Pain (1 - 3)  albuterol/ipratropium for Nebulization 3 milliLiter(s) Nebulizer every 4 hours PRN Shortness of Breath and/or Wheezing  aluminum hydroxide/magnesium hydroxide/simethicone Suspension 30 milliLiter(s) Oral every 4 hours PRN Dyspepsia  melatonin 3 milliGRAM(s) Oral at bedtime PRN Insomnia  ondansetron Injectable 4 milliGRAM(s) IV Push every 8 hours PRN Nausea and/or Vomiting    HOME MEDICATIONS:  Albuterol (Eqv-ProAir HFA) 90 mcg/inh inhalation aerosol: 2 puff(s) inhaled 4 times a day as needed for  bronchospasm  budesonide-formoterol 160 mcg-4.5 mcg/inh inhalation aerosol: 2 puff(s) inhaled 2 times a day  Centrum Adults oral tablet: 1 tab(s) orally once a day  Colace 50 mg oral capsule: 1 cap(s) orally 2 times a day  folic acid 1 mg oral tablet: 1 tab(s) orally once a day  Lasix 20 mg oral tablet: 1 tab(s) orally once a day  simvastatin 10 mg oral tablet: 1 tab(s) orally once a day (at bedtime)  Tylenol 325 mg oral tablet: 2 tab(s) orally every 4 hours, As Needed  Vitamin D2 50 mcg (2000 intl units) oral capsule: 1 cap(s) orally once a day      Vital Signs:   T(F): 96.8 (11-19-23 @ 07:00), Max: 97.6 (11-19-23 @ 00:53)  HR: 64 (11-19-23 @ 07:00) (64 - 90)  BP: 108/59 (11-19-23 @ 07:00) (103/58 - 115/59)  RR: 18 (11-19-23 @ 07:00) (18 - 18)  SpO2: 95% (11-18-23 @ 15:00) (95% - 95%)        Physical Exam:   GENERAL: NAD  HEENT: NCAT  CHEST/LUNG: wheezing RLL  HEART: Regular rate and rhythm; s1 s2 appreciated, No murmurs, rubs, or gallops  ABDOMEN: Soft, Nontender, Nondistended; Bowel sounds present  EXTREMITIES: No LE edema b/l  SKIN: no rashes, no new lesions  NERVOUS SYSTEM:  Alert & Oriented X3  PSYCH: No apparent risk to self or others         Labs:                         8.8    8.32  )-----------( 126      ( 19 Nov 2023 06:37 )             28.1     Neutophil% 60.3, Lymphocyte% 24.4, Monocyte% 14.1, Bands% 1.0 11-19-23 @ 06:37    19 Nov 2023 06:37    142    |  107    |  25     ----------------------------<  137    4.1     |  21     |  0.9      Ca    8.4        19 Nov 2023 06:37  Mg     2.1       19 Nov 2023 06:37    TPro  5.7    /  Alb  2.7    /  TBili  <0.2   /  DBili  x      /  AST  163    /  ALT  155    /  AlkPhos  135    19 Nov 2023 06:37              Troponin <0.01, CKMB --, CK -- 11-17-23 @ 17:00    Iron 25, TIBC 133, %Sat 19 Ferritin 1560 11-18-23 @ 10:55      Urinalysis Basic - ( 19 Nov 2023 06:37 )    Color: x / Appearance: x / SG: x / pH: x  Gluc: 137 mg/dL / Ketone: x  / Bili: x / Urobili: x   Blood: x / Protein: x / Nitrite: x   Leuk Esterase: x / RBC: x / WBC x   Sq Epi: x / Non Sq Epi: x / Bacteria: x          Culture - Blood (collected 11-17-23 @ 17:00)  Source: .Blood Blood-Peripheral  Preliminary Report (11-19-23 @ 04:02):    No growth at 24 hours    Culture - Blood (collected 11-17-23 @ 17:00)  Source: .Blood Blood-Peripheral  Preliminary Report (11-19-23 @ 04:02):    No growth at 24 hours            Assessment and Plan:

## 2023-11-20 ENCOUNTER — TRANSCRIPTION ENCOUNTER (OUTPATIENT)
Age: 78
End: 2023-11-20

## 2023-11-20 LAB
ALBUMIN SERPL ELPH-MCNC: 3.2 G/DL — LOW (ref 3.5–5.2)
ALBUMIN SERPL ELPH-MCNC: 3.2 G/DL — LOW (ref 3.5–5.2)
ALP SERPL-CCNC: 146 U/L — HIGH (ref 30–115)
ALP SERPL-CCNC: 146 U/L — HIGH (ref 30–115)
ALT FLD-CCNC: 207 U/L — HIGH (ref 0–41)
ALT FLD-CCNC: 207 U/L — HIGH (ref 0–41)
ANION GAP SERPL CALC-SCNC: 14 MMOL/L — SIGNIFICANT CHANGE UP (ref 7–14)
ANION GAP SERPL CALC-SCNC: 14 MMOL/L — SIGNIFICANT CHANGE UP (ref 7–14)
AST SERPL-CCNC: 142 U/L — HIGH (ref 0–41)
AST SERPL-CCNC: 142 U/L — HIGH (ref 0–41)
BASOPHILS # BLD AUTO: 0.02 K/UL — SIGNIFICANT CHANGE UP (ref 0–0.2)
BASOPHILS # BLD AUTO: 0.02 K/UL — SIGNIFICANT CHANGE UP (ref 0–0.2)
BASOPHILS NFR BLD AUTO: 0.2 % — SIGNIFICANT CHANGE UP (ref 0–1)
BASOPHILS NFR BLD AUTO: 0.2 % — SIGNIFICANT CHANGE UP (ref 0–1)
BILIRUB SERPL-MCNC: <0.2 MG/DL — SIGNIFICANT CHANGE UP (ref 0.2–1.2)
BILIRUB SERPL-MCNC: <0.2 MG/DL — SIGNIFICANT CHANGE UP (ref 0.2–1.2)
BUN SERPL-MCNC: 31 MG/DL — HIGH (ref 10–20)
BUN SERPL-MCNC: 31 MG/DL — HIGH (ref 10–20)
CALCIUM SERPL-MCNC: 9.1 MG/DL — SIGNIFICANT CHANGE UP (ref 8.4–10.4)
CALCIUM SERPL-MCNC: 9.1 MG/DL — SIGNIFICANT CHANGE UP (ref 8.4–10.4)
CHLORIDE SERPL-SCNC: 105 MMOL/L — SIGNIFICANT CHANGE UP (ref 98–110)
CHLORIDE SERPL-SCNC: 105 MMOL/L — SIGNIFICANT CHANGE UP (ref 98–110)
CO2 SERPL-SCNC: 21 MMOL/L — SIGNIFICANT CHANGE UP (ref 17–32)
CO2 SERPL-SCNC: 21 MMOL/L — SIGNIFICANT CHANGE UP (ref 17–32)
CREAT SERPL-MCNC: 1.1 MG/DL — SIGNIFICANT CHANGE UP (ref 0.7–1.5)
CREAT SERPL-MCNC: 1.1 MG/DL — SIGNIFICANT CHANGE UP (ref 0.7–1.5)
EGFR: 68 ML/MIN/1.73M2 — SIGNIFICANT CHANGE UP
EGFR: 68 ML/MIN/1.73M2 — SIGNIFICANT CHANGE UP
EOSINOPHIL # BLD AUTO: 0.01 K/UL — SIGNIFICANT CHANGE UP (ref 0–0.7)
EOSINOPHIL # BLD AUTO: 0.01 K/UL — SIGNIFICANT CHANGE UP (ref 0–0.7)
EOSINOPHIL NFR BLD AUTO: 0.1 % — SIGNIFICANT CHANGE UP (ref 0–8)
EOSINOPHIL NFR BLD AUTO: 0.1 % — SIGNIFICANT CHANGE UP (ref 0–8)
GLUCOSE SERPL-MCNC: 123 MG/DL — HIGH (ref 70–99)
GLUCOSE SERPL-MCNC: 123 MG/DL — HIGH (ref 70–99)
HCT VFR BLD CALC: 29.9 % — LOW (ref 42–52)
HCT VFR BLD CALC: 29.9 % — LOW (ref 42–52)
HGB BLD-MCNC: 9.6 G/DL — LOW (ref 14–18)
HGB BLD-MCNC: 9.6 G/DL — LOW (ref 14–18)
IMM GRANULOCYTES NFR BLD AUTO: 1.2 % — HIGH (ref 0.1–0.3)
IMM GRANULOCYTES NFR BLD AUTO: 1.2 % — HIGH (ref 0.1–0.3)
LYMPHOCYTES # BLD AUTO: 1.26 K/UL — SIGNIFICANT CHANGE UP (ref 1.2–3.4)
LYMPHOCYTES # BLD AUTO: 1.26 K/UL — SIGNIFICANT CHANGE UP (ref 1.2–3.4)
LYMPHOCYTES # BLD AUTO: 12 % — LOW (ref 20.5–51.1)
LYMPHOCYTES # BLD AUTO: 12 % — LOW (ref 20.5–51.1)
MAGNESIUM SERPL-MCNC: 1.9 MG/DL — SIGNIFICANT CHANGE UP (ref 1.8–2.4)
MAGNESIUM SERPL-MCNC: 1.9 MG/DL — SIGNIFICANT CHANGE UP (ref 1.8–2.4)
MCHC RBC-ENTMCNC: 31.9 PG — HIGH (ref 27–31)
MCHC RBC-ENTMCNC: 31.9 PG — HIGH (ref 27–31)
MCHC RBC-ENTMCNC: 32.1 G/DL — SIGNIFICANT CHANGE UP (ref 32–37)
MCHC RBC-ENTMCNC: 32.1 G/DL — SIGNIFICANT CHANGE UP (ref 32–37)
MCV RBC AUTO: 99.3 FL — HIGH (ref 80–94)
MCV RBC AUTO: 99.3 FL — HIGH (ref 80–94)
MONOCYTES # BLD AUTO: 0.82 K/UL — HIGH (ref 0.1–0.6)
MONOCYTES # BLD AUTO: 0.82 K/UL — HIGH (ref 0.1–0.6)
MONOCYTES NFR BLD AUTO: 7.8 % — SIGNIFICANT CHANGE UP (ref 1.7–9.3)
MONOCYTES NFR BLD AUTO: 7.8 % — SIGNIFICANT CHANGE UP (ref 1.7–9.3)
NEUTROPHILS # BLD AUTO: 8.27 K/UL — HIGH (ref 1.4–6.5)
NEUTROPHILS # BLD AUTO: 8.27 K/UL — HIGH (ref 1.4–6.5)
NEUTROPHILS NFR BLD AUTO: 78.7 % — HIGH (ref 42.2–75.2)
NEUTROPHILS NFR BLD AUTO: 78.7 % — HIGH (ref 42.2–75.2)
NRBC # BLD: 0 /100 WBCS — SIGNIFICANT CHANGE UP (ref 0–0)
NRBC # BLD: 0 /100 WBCS — SIGNIFICANT CHANGE UP (ref 0–0)
PLATELET # BLD AUTO: 177 K/UL — SIGNIFICANT CHANGE UP (ref 130–400)
PLATELET # BLD AUTO: 177 K/UL — SIGNIFICANT CHANGE UP (ref 130–400)
PMV BLD: 9.4 FL — SIGNIFICANT CHANGE UP (ref 7.4–10.4)
PMV BLD: 9.4 FL — SIGNIFICANT CHANGE UP (ref 7.4–10.4)
POTASSIUM SERPL-MCNC: 4.2 MMOL/L — SIGNIFICANT CHANGE UP (ref 3.5–5)
POTASSIUM SERPL-MCNC: 4.2 MMOL/L — SIGNIFICANT CHANGE UP (ref 3.5–5)
POTASSIUM SERPL-SCNC: 4.2 MMOL/L — SIGNIFICANT CHANGE UP (ref 3.5–5)
POTASSIUM SERPL-SCNC: 4.2 MMOL/L — SIGNIFICANT CHANGE UP (ref 3.5–5)
PROT SERPL-MCNC: 6.4 G/DL — SIGNIFICANT CHANGE UP (ref 6–8)
PROT SERPL-MCNC: 6.4 G/DL — SIGNIFICANT CHANGE UP (ref 6–8)
RBC # BLD: 3.01 M/UL — LOW (ref 4.7–6.1)
RBC # BLD: 3.01 M/UL — LOW (ref 4.7–6.1)
RBC # FLD: 15.9 % — HIGH (ref 11.5–14.5)
RBC # FLD: 15.9 % — HIGH (ref 11.5–14.5)
SODIUM SERPL-SCNC: 140 MMOL/L — SIGNIFICANT CHANGE UP (ref 135–146)
SODIUM SERPL-SCNC: 140 MMOL/L — SIGNIFICANT CHANGE UP (ref 135–146)
WBC # BLD: 10.51 K/UL — SIGNIFICANT CHANGE UP (ref 4.8–10.8)
WBC # BLD: 10.51 K/UL — SIGNIFICANT CHANGE UP (ref 4.8–10.8)
WBC # FLD AUTO: 10.51 K/UL — SIGNIFICANT CHANGE UP (ref 4.8–10.8)
WBC # FLD AUTO: 10.51 K/UL — SIGNIFICANT CHANGE UP (ref 4.8–10.8)

## 2023-11-20 PROCEDURE — 76705 ECHO EXAM OF ABDOMEN: CPT | Mod: 26

## 2023-11-20 PROCEDURE — 99232 SBSQ HOSP IP/OBS MODERATE 35: CPT

## 2023-11-20 RX ORDER — AZITHROMYCIN 500 MG/1
500 TABLET, FILM COATED ORAL EVERY 24 HOURS
Refills: 0 | Status: DISCONTINUED | OUTPATIENT
Start: 2023-11-20 | End: 2023-11-20

## 2023-11-20 RX ORDER — AZITHROMYCIN 500 MG/1
500 TABLET, FILM COATED ORAL EVERY 24 HOURS
Refills: 0 | Status: DISCONTINUED | OUTPATIENT
Start: 2023-11-20 | End: 2023-11-21

## 2023-11-20 RX ADMIN — Medication 1 TABLET(S): at 13:01

## 2023-11-20 RX ADMIN — Medication 3 MILLILITER(S): at 08:18

## 2023-11-20 RX ADMIN — Medication 2000 UNIT(S): at 13:01

## 2023-11-20 RX ADMIN — Medication 600 MILLIGRAM(S): at 17:19

## 2023-11-20 RX ADMIN — Medication 1 MILLIGRAM(S): at 13:03

## 2023-11-20 RX ADMIN — ENOXAPARIN SODIUM 40 MILLIGRAM(S): 100 INJECTION SUBCUTANEOUS at 21:31

## 2023-11-20 RX ADMIN — Medication 600 MILLIGRAM(S): at 05:33

## 2023-11-20 RX ADMIN — Medication 3 MILLILITER(S): at 14:06

## 2023-11-20 RX ADMIN — CEFEPIME 100 MILLIGRAM(S): 1 INJECTION, POWDER, FOR SOLUTION INTRAMUSCULAR; INTRAVENOUS at 05:32

## 2023-11-20 RX ADMIN — Medication 20 MILLIGRAM(S): at 05:33

## 2023-11-20 RX ADMIN — Medication 60 MILLIGRAM(S): at 05:33

## 2023-11-20 NOTE — DISCHARGE NOTE PROVIDER - NSDCCPCAREPLAN_GEN_ALL_CORE_FT
PRINCIPAL DISCHARGE DIAGNOSIS  Diagnosis: Pneumonia  Assessment and Plan of Treatment: You were evaluated in the hospital for your COPD exacerbation/Pneumonia.  A COPD exacerbation is characterized by dyspnea and/or cough and sputum that worsens over 2 weeks.; it may be accompanied by increased work of breathing or high heart rate associated with increased local and systemic inflammation caused by airway infection, pulmonary embolism, pollution, or other airway insult. You were managed with antibiotics, steroids, nebulizers to open up your airways. Blood work also revealed signs of liver injury and imaging showed___________.   After discharge, you will need to:   - Follow up with your primary care doctor within 1-2 weeks  - Follow up with oncology within 1-2 weeks  - Take all the medications you were discharged with, unless otherwise instructed by your healthcare provider(s).   Please follow up with your providers by calling them to make an appointment so that you can see them in 1-2weeks; bring your paperwork from this hospital stay to that visit. You can access your visit information by signing up for an account for the patient portal at [email or web address]   Seek immediate medical attention if you develop fevers, chills, chest pain, shortness of breath, nausea and vomiting, abdominal pain, passing out, weakness or numbness or tingling on one side of your body, or any other concerning signs or symptoms.        SECONDARY DISCHARGE DIAGNOSES  Diagnosis: SOB (shortness of breath)  Assessment and Plan of Treatment:      PRINCIPAL DISCHARGE DIAGNOSIS  Diagnosis: Pneumonia  Assessment and Plan of Treatment: You were evaluated in the hospital for your COPD exacerbation.  A COPD exacerbation is characterized by dyspnea and/or cough and sputum that worsens over 2 weeks.; it may be accompanied by increased work of breathing or high heart rate associated with increased local and systemic inflammation caused by airway infection, pulmonary embolism, pollution, or other airway insult. You were managed with antibiotics, steroids, nebulizers to open up your airways.  You will continue prednisone 40mg for 5 days. Blood work also revealed signs of liver injury and imaging unremarkable. However simvastatin was held as liver injury can be a side effect. Imaging also reveal small amount on the left side of the lung. Please follow up with primary doctor for repeat chest xray  After discharge, you will need to:   - Follow up with your primary care doctor within 1-2 weeks  - Follow up with oncology within 1-2 weeks  - Take all the medications you were discharged with, unless otherwise instructed by your healthcare provider(s).   Please follow up with your providers by calling them to make an appointment so that you can see them in 1-2weeks; bring your paperwork from this hospital stay to that visit. You can access your visit information by signing up for an account for the patient portal at [email or web address]   Seek immediate medical attention if you develop fevers, chills, chest pain, shortness of breath, nausea and vomiting, abdominal pain, passing out, weakness or numbness or tingling on one side of your body, or any other concerning signs or symptoms.        SECONDARY DISCHARGE DIAGNOSES  Diagnosis: SOB (shortness of breath)  Assessment and Plan of Treatment:      PRINCIPAL DISCHARGE DIAGNOSIS  Diagnosis: Pneumonia  Assessment and Plan of Treatment: You were evaluated in the hospital for your COPD exacerbation.  A COPD exacerbation is characterized by dyspnea and/or cough and sputum that worsens over 2 weeks.; it may be accompanied by increased work of breathing or high heart rate associated with increased local and systemic inflammation caused by airway infection, pulmonary embolism, pollution, or other airway insult. You were managed with antibiotics, steroids, nebulizers to open up your airways.  You will continue prednisone 40mg for 5 days. Blood work also revealed signs of liver injury and imaging unremarkable. However simvastatin was held as liver injury can be a side effect. Follow up with primary doctor about continuing medication and with hepatology for further evaluation. Imaging also reveal small amount on the left side of the lung. Please follow up with primary doctor for repeat chest xray  After discharge, you will need to:   - Follow up with your primary care doctor within 1-2 weeks  - Follow up with oncology within 1-2 weeks  -follow up with Hepatologist Dr Witt within 1 week  - Take all the medications you were discharged with, unless otherwise instructed by your healthcare provider(s).   Please follow up with your providers by calling them to make an appointment so that you can see them in 1-2weeks; bring your paperwork from this hospital stay to that visit. You can access your visit information by signing up for an account for the patient portal at [email or web address]   Seek immediate medical attention if you develop fevers, chills, chest pain, shortness of breath, nausea and vomiting, abdominal pain, passing out, weakness or numbness or tingling on one side of your body, or any other concerning signs or symptoms.        SECONDARY DISCHARGE DIAGNOSES  Diagnosis: SOB (shortness of breath)  Assessment and Plan of Treatment:      PRINCIPAL DISCHARGE DIAGNOSIS  Diagnosis: Pneumonia  Assessment and Plan of Treatment: You were evaluated in the hospital for your COPD exacerbation.  A COPD exacerbation is characterized by dyspnea and/or cough and sputum that worsens over 2 weeks.; it may be accompanied by increased work of breathing or high heart rate associated with increased local and systemic inflammation caused by airway infection, pulmonary embolism, pollution, or other airway insult. You were managed with antibiotics, steroids, nebulizers to open up your airways.  You will continue prednisone course with tapered doses as instructed on medication. . Blood work also revealed signs of liver injury and imaging unremarkable. However simvastatin was held as liver injury can be a side effect. Follow up with primary doctor about continuing medication and with hepatology for further evaluation. Imaging also reveal small amount on the left side of the lung. Please follow up with primary doctor for repeat chest xray  After discharge, you will need to:   - Follow up with your primary care doctor within 1-2 weeks  - Follow up with oncology within 1-2 weeks  -follow up with Hepatologist Dr Witt within 1 week  - Take all the medications you were discharged with, unless otherwise instructed by your healthcare provider(s).   Please follow up with your providers by calling them to make an appointment so that you can see them in 1-2weeks; bring your paperwork from this hospital stay to that visit. You can access your visit information by signing up for an account for the patient portal at [email or web address]   Seek immediate medical attention if you develop fevers, chills, chest pain, shortness of breath, nausea and vomiting, abdominal pain, passing out, weakness or numbness or tingling on one side of your body, or any other concerning signs or symptoms.        SECONDARY DISCHARGE DIAGNOSES  Diagnosis: SOB (shortness of breath)  Assessment and Plan of Treatment:      PRINCIPAL DISCHARGE DIAGNOSIS  Diagnosis: COPD exacerbation  Assessment and Plan of Treatment: You were evaluated in the hospital for your COPD exacerbation.  A COPD exacerbation is characterized by dyspnea and/or cough and sputum that worsens over 2 weeks.; it may be accompanied by increased work of breathing or high heart rate associated with increased local and systemic inflammation caused by airway infection, pulmonary embolism, pollution, or other airway insult. You were managed with antibiotics, steroids, nebulizers to open up your airways.  You will continue prednisone course with tapered doses as instructed on medication. . Blood work also revealed signs of liver injury and imaging unremarkable. However simvastatin was held as liver injury can be a side effect. Follow up with primary doctor about continuing medication and with hepatology for further evaluation. Imaging also reveal small amount on the left side of the lung. Please follow up with primary doctor for repeat chest xray  After discharge, you will need to:   - Follow up with your primary care doctor within 1-2 weeks  - Follow up with oncology within 1-2 weeks  -follow up with Hepatologist Dr Witt within 1 week  - Take all the medications you were discharged with, unless otherwise instructed by your healthcare provider(s).   Please follow up with your providers by calling them to make an appointment so that you can see them in 1-2weeks; bring your paperwork from this hospital stay to that visit. You can access your visit information by signing up for an account for the patient portal at [email or web address]   Seek immediate medical attention if you develop fevers, chills, chest pain, shortness of breath, nausea and vomiting, abdominal pain, passing out, weakness or numbness or tingling on one side of your body, or any other concerning signs or symptoms.        SECONDARY DISCHARGE DIAGNOSES  Diagnosis: Transaminitis  Assessment and Plan of Treatment:     Diagnosis: Lung cancer  Assessment and Plan of Treatment:     Diagnosis: Pleural effusion  Assessment and Plan of Treatment:

## 2023-11-20 NOTE — PROGRESS NOTE ADULT - SUBJECTIVE AND OBJECTIVE BOX
SUBJECTIVE:    Patient is a 78y old Male who presents with a chief complaint of Shortness of breath (19 Nov 2023 10:56)    Currently admitted to medicine with the primary diagnosis of Pneumonia       Today is hospital day 3d.     PAST MEDICAL & SURGICAL HISTORY  COPD, mild    Smoker    Melanoma of skin    Basal cell carcinoma    Lung cancer    Pneumothorax, post biopsy, left    H/O carpal tunnel repair    H/O basal cell carcinoma excision    H/O melanoma excision    After cataract, bilateral    S/P chest tube placement      ALLERGIES:  No Known Allergies    MEDICATIONS:  STANDING MEDICATIONS  albuterol/ipratropium for Nebulization 3 milliLiter(s) Nebulizer every 6 hours  azithromycin  IVPB 500 milliGRAM(s) IV Intermittent every 24 hours  budesonide 160 MICROgram(s)/formoterol 4.5 MICROgram(s) Inhaler 2 Puff(s) Inhalation two times a day  cefepime   IVPB 2000 milliGRAM(s) IV Intermittent every 8 hours  cholecalciferol 2000 Unit(s) Oral daily  enoxaparin Injectable 40 milliGRAM(s) SubCutaneous every 24 hours  folic acid 1 milliGRAM(s) Oral daily  furosemide    Tablet 20 milliGRAM(s) Oral daily  guaiFENesin  milliGRAM(s) Oral every 12 hours  methylPREDNISolone sodium succinate Injectable 60 milliGRAM(s) IV Push every 24 hours  multivitamin/minerals 1 Tablet(s) Oral daily  pantoprazole    Tablet 40 milliGRAM(s) Oral before breakfast  polyethylene glycol 3350 17 Gram(s) Oral daily  senna 2 Tablet(s) Oral at bedtime    PRN MEDICATIONS  albuterol/ipratropium for Nebulization 3 milliLiter(s) Nebulizer every 4 hours PRN  aluminum hydroxide/magnesium hydroxide/simethicone Suspension 30 milliLiter(s) Oral every 4 hours PRN  melatonin 3 milliGRAM(s) Oral at bedtime PRN  ondansetron Injectable 4 milliGRAM(s) IV Push every 8 hours PRN    VITALS:   T(F): 96.8  HR: 91  BP: 136/67  RR: 16  SpO2: 94%    LABS:                        9.6    10.51 )-----------( 177      ( 20 Nov 2023 07:34 )             29.9     11-20    140  |  105  |  31<H>  ----------------------------<  123<H>  4.2   |  21  |  1.1    Ca    9.1      20 Nov 2023 07:34  Mg     1.9     11-20    TPro  6.4  /  Alb  3.2<L>  /  TBili  <0.2  /  DBili  x   /  AST  142<H>  /  ALT  207<H>  /  AlkPhos  146<H>  11-20      Urinalysis Basic - ( 20 Nov 2023 07:34 )    Color: x / Appearance: x / SG: x / pH: x  Gluc: 123 mg/dL / Ketone: x  / Bili: x / Urobili: x   Blood: x / Protein: x / Nitrite: x   Leuk Esterase: x / RBC: x / WBC x   Sq Epi: x / Non Sq Epi: x / Bacteria: x            Culture - Blood (collected 17 Nov 2023 17:00)  Source: .Blood Blood-Peripheral  Preliminary Report (20 Nov 2023 04:01):    No growth at 48 Hours    Culture - Blood (collected 17 Nov 2023 17:00)  Source: .Blood Blood-Peripheral  Preliminary Report (20 Nov 2023 04:01):    No growth at 48 Hours          RADIOLOGY:    PHYSICAL EXAM:  GEN: No acute distress, hoarse voice  LUNGS: Clear to auscultation bilaterally   HEART: S1/S2 present. RRR.   ABD/ GI: Soft, non-tender, non-distended. Bowel sounds present  EXT: NC/NC/NE/2+PP/[place]  NEURO: AAOX3

## 2023-11-20 NOTE — PROGRESS NOTE ADULT - SUBJECTIVE AND OBJECTIVE BOX
24H events:    Patient is a 78y old Male who presents with a chief complaint of Shortness of breath (20 Nov 2023 14:01)    Primary diagnosis of Pneumonia    Today is hospital day 3d. This morning patient was seen and examined at bedside, resting comfortably in bed.    No acute or major events overnight.    Code Status:      PAST MEDICAL & SURGICAL HISTORY  COPD, mild    Smoker    Melanoma of skin    Basal cell carcinoma    Lung cancer    Pneumothorax, post biopsy, left    H/O carpal tunnel repair    H/O basal cell carcinoma excision    H/O melanoma excision    After cataract, bilateral    S/P chest tube placement      SOCIAL HISTORY:  Social History:  Former smoker (18 Nov 2023 04:53)      ALLERGIES:  No Known Allergies    MEDICATIONS:  STANDING MEDICATIONS  albuterol/ipratropium for Nebulization 3 milliLiter(s) Nebulizer every 6 hours  azithromycin  IVPB 500 milliGRAM(s) IV Intermittent every 24 hours  budesonide 160 MICROgram(s)/formoterol 4.5 MICROgram(s) Inhaler 2 Puff(s) Inhalation two times a day  cholecalciferol 2000 Unit(s) Oral daily  enoxaparin Injectable 40 milliGRAM(s) SubCutaneous every 24 hours  folic acid 1 milliGRAM(s) Oral daily  furosemide    Tablet 20 milliGRAM(s) Oral daily  guaiFENesin  milliGRAM(s) Oral every 12 hours  multivitamin/minerals 1 Tablet(s) Oral daily  pantoprazole    Tablet 40 milliGRAM(s) Oral before breakfast  polyethylene glycol 3350 17 Gram(s) Oral daily  senna 2 Tablet(s) Oral at bedtime    PRN MEDICATIONS  albuterol/ipratropium for Nebulization 3 milliLiter(s) Nebulizer every 4 hours PRN  aluminum hydroxide/magnesium hydroxide/simethicone Suspension 30 milliLiter(s) Oral every 4 hours PRN  melatonin 3 milliGRAM(s) Oral at bedtime PRN  ondansetron Injectable 4 milliGRAM(s) IV Push every 8 hours PRN    VITALS:   T(F): 96.8  HR: 91  BP: 136/67  RR: 16  SpO2: 94%    PHYSICAL EXAM:    LABS:                        9.6    10.51 )-----------( 177      ( 20 Nov 2023 07:34 )             29.9     11-20    140  |  105  |  31<H>  ----------------------------<  123<H>  4.2   |  21  |  1.1    Ca    9.1      20 Nov 2023 07:34  Mg     1.9     11-20    TPro  6.4  /  Alb  3.2<L>  /  TBili  <0.2  /  DBili  x   /  AST  142<H>  /  ALT  207<H>  /  AlkPhos  146<H>  11-20      Urinalysis Basic - ( 20 Nov 2023 07:34 )    Color: x / Appearance: x / SG: x / pH: x  Gluc: 123 mg/dL / Ketone: x  / Bili: x / Urobili: x   Blood: x / Protein: x / Nitrite: x   Leuk Esterase: x / RBC: x / WBC x   Sq Epi: x / Non Sq Epi: x / Bacteria: x            Culture - Blood (collected 17 Nov 2023 17:00)  Source: .Blood Blood-Peripheral  Preliminary Report (20 Nov 2023 04:01):    No growth at 48 Hours    Culture - Blood (collected 17 Nov 2023 17:00)  Source: .Blood Blood-Peripheral  Preliminary Report (20 Nov 2023 04:01):    No growth at 48 Hours          RADIOLOGY:           24H events:    Patient is a 78y old Male who presents with a chief complaint of Shortness of breath (20 Nov 2023 14:01)    Primary diagnosis of Pneumonia    Today is hospital day 3d. This morning patient was seen and examined at bedside, resting comfortably in bed.    No acute or major events overnight.    Code Status: Full code      PAST MEDICAL & SURGICAL HISTORY  COPD, mild    Smoker    Melanoma of skin    Basal cell carcinoma    Lung cancer    Pneumothorax, post biopsy, left    H/O carpal tunnel repair    H/O basal cell carcinoma excision    H/O melanoma excision    After cataract, bilateral    S/P chest tube placement      SOCIAL HISTORY:  Social History:  Former smoker (18 Nov 2023 04:53)      ALLERGIES:  No Known Allergies    MEDICATIONS:  STANDING MEDICATIONS  albuterol/ipratropium for Nebulization 3 milliLiter(s) Nebulizer every 6 hours  azithromycin  IVPB 500 milliGRAM(s) IV Intermittent every 24 hours  budesonide 160 MICROgram(s)/formoterol 4.5 MICROgram(s) Inhaler 2 Puff(s) Inhalation two times a day  cholecalciferol 2000 Unit(s) Oral daily  enoxaparin Injectable 40 milliGRAM(s) SubCutaneous every 24 hours  folic acid 1 milliGRAM(s) Oral daily  furosemide    Tablet 20 milliGRAM(s) Oral daily  guaiFENesin  milliGRAM(s) Oral every 12 hours  multivitamin/minerals 1 Tablet(s) Oral daily  pantoprazole    Tablet 40 milliGRAM(s) Oral before breakfast  polyethylene glycol 3350 17 Gram(s) Oral daily  senna 2 Tablet(s) Oral at bedtime    PRN MEDICATIONS  albuterol/ipratropium for Nebulization 3 milliLiter(s) Nebulizer every 4 hours PRN  aluminum hydroxide/magnesium hydroxide/simethicone Suspension 30 milliLiter(s) Oral every 4 hours PRN  melatonin 3 milliGRAM(s) Oral at bedtime PRN  ondansetron Injectable 4 milliGRAM(s) IV Push every 8 hours PRN    VITALS:   T(F): 96.8  HR: 91  BP: 136/67  RR: 16  SpO2: 94%    PHYSICAL EXAM:  CONSTITUTIONAL: NAD, 2L NC  HEENT: AT, NC  RESP: No respiratory distress, no use of accessory muscles, poor airway entry b/l,   CV: RRR, +S1S2, +2 LE edema  GI: Soft, NT, ND, no rebound, no guarding  MSK: Normal muscle strength/tone  SKIN: LE chronic venous stasis changes  NEURO: Sensation intact in upper and lower extremities b/l to light touch   PSYCH:   LABS:                        9.6    10.51 )-----------( 177      ( 20 Nov 2023 07:34 )             29.9     11-20    140  |  105  |  31<H>  ----------------------------<  123<H>  4.2   |  21  |  1.1    Ca    9.1      20 Nov 2023 07:34  Mg     1.9     11-20    TPro  6.4  /  Alb  3.2<L>  /  TBili  <0.2  /  DBili  x   /  AST  142<H>  /  ALT  207<H>  /  AlkPhos  146<H>  11-20      Urinalysis Basic - ( 20 Nov 2023 07:34 )    Color: x / Appearance: x / SG: x / pH: x  Gluc: 123 mg/dL / Ketone: x  / Bili: x / Urobili: x   Blood: x / Protein: x / Nitrite: x   Leuk Esterase: x / RBC: x / WBC x   Sq Epi: x / Non Sq Epi: x / Bacteria: x            Culture - Blood (collected 17 Nov 2023 17:00)  Source: .Blood Blood-Peripheral  Preliminary Report (20 Nov 2023 04:01):    No growth at 48 Hours    Culture - Blood (collected 17 Nov 2023 17:00)  Source: .Blood Blood-Peripheral  Preliminary Report (20 Nov 2023 04:01):    No growth at 48 Hours          RADIOLOGY:           24H events:    Patient is a 78y old Male who presents with a chief complaint of Shortness of breath (20 Nov 2023 14:01)    Primary diagnosis of Pneumonia    Today is hospital day 3d. This morning patient was seen and examined at bedside, resting comfortably in bed.    No acute or major events overnight.    Code Status: Full code      PAST MEDICAL & SURGICAL HISTORY  COPD, mild    Smoker    Melanoma of skin    Basal cell carcinoma    Lung cancer    Pneumothorax, post biopsy, left    H/O carpal tunnel repair    H/O basal cell carcinoma excision    H/O melanoma excision    After cataract, bilateral    S/P chest tube placement      SOCIAL HISTORY:  Social History:  Former smoker (18 Nov 2023 04:53)      ALLERGIES:  No Known Allergies    MEDICATIONS:  STANDING MEDICATIONS  albuterol/ipratropium for Nebulization 3 milliLiter(s) Nebulizer every 6 hours  azithromycin  IVPB 500 milliGRAM(s) IV Intermittent every 24 hours  budesonide 160 MICROgram(s)/formoterol 4.5 MICROgram(s) Inhaler 2 Puff(s) Inhalation two times a day  cholecalciferol 2000 Unit(s) Oral daily  enoxaparin Injectable 40 milliGRAM(s) SubCutaneous every 24 hours  folic acid 1 milliGRAM(s) Oral daily  furosemide    Tablet 20 milliGRAM(s) Oral daily  guaiFENesin  milliGRAM(s) Oral every 12 hours  multivitamin/minerals 1 Tablet(s) Oral daily  pantoprazole    Tablet 40 milliGRAM(s) Oral before breakfast  polyethylene glycol 3350 17 Gram(s) Oral daily  senna 2 Tablet(s) Oral at bedtime    PRN MEDICATIONS  albuterol/ipratropium for Nebulization 3 milliLiter(s) Nebulizer every 4 hours PRN  aluminum hydroxide/magnesium hydroxide/simethicone Suspension 30 milliLiter(s) Oral every 4 hours PRN  melatonin 3 milliGRAM(s) Oral at bedtime PRN  ondansetron Injectable 4 milliGRAM(s) IV Push every 8 hours PRN    VITALS:   T(F): 96.8  HR: 91  BP: 136/67  RR: 16  SpO2: 94%    PHYSICAL EXAM:  CONSTITUTIONAL: NAD, RA  HEENT: AT, NC  RESP: No respiratory distress, no use of accessory muscles, poor airway entry b/l,   CV: RRR, +S1S2,  GI: Soft, NT, ND, no rebound, no guarding  MSK: Normal muscle strength/tone  SKIN: LE chronic venous stasis changes  NEURO: Sensation intact in upper and lower extremities b/l to light touch     LABS:                        9.6    10.51 )-----------( 177      ( 20 Nov 2023 07:34 )             29.9     11-20    140  |  105  |  31<H>  ----------------------------<  123<H>  4.2   |  21  |  1.1    Ca    9.1      20 Nov 2023 07:34  Mg     1.9     11-20    TPro  6.4  /  Alb  3.2<L>  /  TBili  <0.2  /  DBili  x   /  AST  142<H>  /  ALT  207<H>  /  AlkPhos  146<H>  11-20      Urinalysis Basic - ( 20 Nov 2023 07:34 )    Color: x / Appearance: x / SG: x / pH: x  Gluc: 123 mg/dL / Ketone: x  / Bili: x / Urobili: x   Blood: x / Protein: x / Nitrite: x   Leuk Esterase: x / RBC: x / WBC x   Sq Epi: x / Non Sq Epi: x / Bacteria: x            Culture - Blood (collected 17 Nov 2023 17:00)  Source: .Blood Blood-Peripheral  Preliminary Report (20 Nov 2023 04:01):    No growth at 48 Hours    Culture - Blood (collected 17 Nov 2023 17:00)  Source: .Blood Blood-Peripheral  Preliminary Report (20 Nov 2023 04:01):    No growth at 48 Hours          RADIOLOGY:

## 2023-11-20 NOTE — PROGRESS NOTE ADULT - ASSESSMENT
Pt is a 79 y/o M PMHx of COPD, lung ca on chemotherapy not on home O2, HLD, LE edema presents c/o cough/fever/SOB for 2 days.      1. Acute Hypoxic Respiratory Failure likely secondary to Acute COPD Exacerbation and aspiration pneumonia   -  RVP: negative                  -continue azithromycin   -- Urine leg:pending              - Procalcitonin: mildly elevated  - Speech and swallow cleared  - Pulse ox q8 hrs  - Nebs q 6 hrs and q4h PRN + Symbicort  - Solumedrol change to prednisone  - Supplemental Oxygen PRN, titrate to SPO2 goal 88-92%   - Mucinex bid   - CT angio chest:  a) No acute pulmonary emboli.  b) Evidence of small airways infection/inflammation in bilateral posterior upper lobes and posterior lower lobes, correlate for history of aspiration.  c) Improved mediastinal adenopathy.  d)  Unchanged bilateral hilar adenopathy and left anterior upper lobe nodule.    2. Lung Ca  - Oncologist Dr Malcolm - next chemo scheduled 11/30      3. HLD  - C/w home meds    4. Thrombocytopenia seems chronic   - Observe     5. macrocytic anemia with elevated  - iron profile:low    6. Elevated LFTS-- US liver pending suspect could be due to cefepime-- bilirubin is normal  monitor-- off cefepime--  DC plan am if lfts trending down

## 2023-11-20 NOTE — DISCHARGE NOTE PROVIDER - NSDCMRMEDTOKEN_GEN_ALL_CORE_FT
Albuterol (Eqv-ProAir HFA) 90 mcg/inh inhalation aerosol: 2 puff(s) inhaled 4 times a day as needed for  bronchospasm  budesonide-formoterol 160 mcg-4.5 mcg/inh inhalation aerosol: 2 puff(s) inhaled 2 times a day  Centrum Adults oral tablet: 1 tab(s) orally once a day  Colace 50 mg oral capsule: 1 cap(s) orally 2 times a day  folic acid 1 mg oral tablet: 1 tab(s) orally once a day  Lasix 20 mg oral tablet: 1 tab(s) orally once a day  simvastatin 10 mg oral tablet: 1 tab(s) orally once a day (at bedtime)  Tylenol 325 mg oral tablet: 2 tab(s) orally every 4 hours, As Needed  Vitamin D2 50 mcg (2000 intl units) oral capsule: 1 cap(s) orally once a day   Albuterol (Eqv-ProAir HFA) 90 mcg/inh inhalation aerosol: 2 puff(s) inhaled 4 times a day as needed for  bronchospasm  budesonide-formoterol 160 mcg-4.5 mcg/inh inhalation aerosol: 2 puff(s) inhaled 2 times a day  Centrum Adults oral tablet: 1 tab(s) orally once a day  Colace 50 mg oral capsule: 1 cap(s) orally 2 times a day  folic acid 1 mg oral tablet: 1 tab(s) orally once a day  furosemide 40 mg oral tablet: 1 tab(s) orally once a day take 40mg once per day for five days then continue taking 20mg once per day  senna leaf extract oral tablet: 2 tab(s) orally once a day (at bedtime) as needed for  constipation can stop medication if having more than 3 bowel movements per day  Vitamin D2 50 mcg (2000 intl units) oral capsule: 1 cap(s) orally once a day   Albuterol (Eqv-ProAir HFA) 90 mcg/inh inhalation aerosol: 2 puff(s) inhaled 4 times a day as needed for  bronchospasm  budesonide-formoterol 160 mcg-4.5 mcg/inh inhalation aerosol: 2 puff(s) inhaled 2 times a day  Centrum Adults oral tablet: 1 tab(s) orally once a day  Colace 50 mg oral capsule: 1 cap(s) orally 2 times a day  folic acid 1 mg oral tablet: 1 tab(s) orally once a day  furosemide 40 mg oral tablet: 1 tab(s) orally once a day take 40mg once per day for five days then continue taking 20mg once per day  predniSONE 10 mg oral tablet: 1 tab(s) orally once a day take 4 tabs for 3 days then 2 tabs for 3 days then 1 tab for three days then stop  senna leaf extract oral tablet: 2 tab(s) orally once a day (at bedtime) as needed for  constipation can stop medication if having more than 3 bowel movements per day  Vitamin D2 50 mcg (2000 intl units) oral capsule: 1 cap(s) orally once a day

## 2023-11-20 NOTE — DISCHARGE NOTE PROVIDER - HOSPITAL COURSE
Pt is a 77 y/o M PMHx of COPD, lung ca on chemotherapy not on home O2, HLD, LE edema presents c/o cough/fever/SOB for 2 days. Pt reports a Tmax 101F at home. States he went to  who advised him to come to ED. Denies headache, recent illness/travel, abdominal pain, and chest pain. In ED vitals /78 HR 83 RR 22 T98F sat 94% on 2L NC. CTA PE Protocol w/ IV Cont 1. No acute pulmonary emboli but with Evidence of small airways infection/inflammation in bilateral posterior upper lobes and posterior lower lobes, correlate for history of aspiration. VBG unremarkable Admitted for COPD exacerbation/ PNA. Patient managed with duonebs, symbicort,solumedrol daily. and started on cefepime and azithromycin. Procal 0.19. Patient also developed transaminitis, possibly due to cefepime which was discontinued. RUQ US showed____________. Patient is on RA, completed __ days of abx. Patient medically stable for discharge. Pt is a 77 y/o M PMHx of COPD, lung ca on chemotherapy not on home O2, HLD, LE edema presents c/o cough/fever/SOB for 2 days. Pt reports a Tmax 101F at home. States he went to  who advised him to come to ED. Denies headache, recent illness/travel, abdominal pain, and chest pain. In ED vitals /78 HR 83 RR 22 T98F sat 94% on 2L NC. CTA PE Protocol w/ IV Cont 1. No acute pulmonary emboli but with Evidence of small airways infection/inflammation in bilateral posterior upper lobes and posterior lower lobes, small left pleural effusion. VBG unremarkable Admitted for COPD exacerbation. Patient managed with duonebs, symbicort, solumedrol daily, and started on cefepime and azithromycin. Procal 0.19. Patient also developed transaminitis, possibly due to cefepime which was discontinued. RUQ US unremarkable. Patient is on room air, to finish five days of azithromycin. Patient medically stable for discharge. Please repeat Chest Xray with primary doctor Pt is a 77 y/o M PMHx of COPD, lung ca on chemotherapy not on home O2, HLD, LE edema presents c/o cough/fever/SOB for 2 days. Pt reports a Tmax 101F at home. States he went to  who advised him to come to ED. Denies headache, recent illness/travel, abdominal pain, and chest pain. In ED vitals /78 HR 83 RR 22 T98F sat 94% on 2L NC. CTA PE Protocol w/ IV Cont 1. No acute pulmonary emboli but with Evidence of small airways infection/inflammation in bilateral posterior upper lobes and posterior lower lobes, small left pleural effusion. VBG unremarkable Admitted for COPD exacerbation. Patient managed with duonebs, symbicort, solumedrol daily, and started on cefepime and azithromycin. Procal 0.19. Patient also developed transaminitis, possibly due to cefepime which was discontinued. RUQ US unremarkable. Patient needs to follow up with Hepatology. Patient is on room air, completed course of azithromycin. Patient medically stable for discharge. Please repeat Chest Xray with primary doctor

## 2023-11-20 NOTE — DISCHARGE NOTE PROVIDER - ATTENDING DISCHARGE PHYSICAL EXAMINATION:
GENERAL: NAD, lying in bed comfortably  CHEST/LUNG: Clear to auscultation bilaterally; No rales, rhonchi, wheezing, or rubs. Unlabored respirations  HEART: Regular rate and rhythm; No murmurs, rubs, or gallops  ABDOMEN: Bowel sounds present; Soft, Nontender, Nondistended. No hepatomegally  EXTREMITIES:  2+ Peripheral Pulses, brisk capillary refill. No clubbing, cyanosis, or edema  NERVOUS SYSTEM:  Alert & Oriented X3, speech clear. No deficits

## 2023-11-20 NOTE — PROGRESS NOTE ADULT - ASSESSMENT
Pt is a 79 y/o M PMHx of COPD, lung ca on chemotherapy not on home O2, HLD, LE edema presents c/o cough/fever/SOB for 2 days.      # Acute Hypoxic Respiratory Failure likely secondary to Acute COPD Exacerbation   - He desaturated to 88% on ambulation (he is not chronic hypercapnic). Will continue IV steroids  - Admit to medicine            -LLL opacity                   - d/c cefepime, Cont azithromycin   - Procalcitonin: 0.19  - Nebs q 6 hrs and q4h PRN + Symbicort  - Solumedrol 60 daily   - Mucinex bid   - CT angio chest: Evidence of small airways infection/inflammation in bilateral posterior upper lobes and posterior lower lobes, correlate for history of aspiration.    # Increasing elevated LFTs  - monitor, f/u US RUQ    # Lung Ca  - Oncologist Dr Malcolm - next chemo scheduled 11/30      #. HLD  - C/w home meds    #. Thrombocytopenia seems chronic   - Observe     # macrocytic anemia with elevated RDW  - iron profile: anemia of chronic disease  - vit b12:normal - Folat: normal Pt is a 79 y/o M PMHx of COPD, lung ca on chemotherapy not on home O2, HLD, LE edema presents c/o cough/fever/SOB for 2 days.      # Acute Hypoxic Respiratory Failure likely secondary to Acute COPD Exacerbation   - He desaturated to 88% on ambulation (he is not chronic hypercapnic). Will continue IV steroids  - Admit to medicine            -LLL opacity                   - d/c cefepime, Cont azithromycin   - Procalcitonin: 0.19 f/u rpt  - Nebs q 6 hrs and q4h PRN + Symbicort  - Solumedrol 60 daily switch to prednisone 50mg   - Mucinex bid   - CT angio chest: Evidence of small airways infection/inflammation in bilateral posterior upper lobes and posterior lower lobes, correlate for history of aspiration.    # Increasing elevated LFTs  -f/u US RUQ  -hold simvastatin  -trend lfts     # Lung Ca  - Oncologist Dr Malcolm - next chemo scheduled 11/30      #. HLD  - C/w home meds    #. Thrombocytopenia seems chronic   - Observe     # macrocytic anemia with elevated RDW  - iron profile: anemia of chronic disease  - vit b12:normal - Folat: normal Pt is a 77 y/o M PMHx of COPD, lung ca on chemotherapy not on home O2, HLD, LE edema presents c/o cough/fever/SOB for 2 days.      # Acute Hypoxic Respiratory Failure likely secondary to Acute COPD Exacerbation   - He desaturated to 88% on ambulation (he is not chronic hypercapnic). Will continue IV steroids  - Admit to medicine            -LLL opacity                   - d/c cefepime, Cont azithromycin   - Procalcitonin: 0.19 f/u rpt  - Nebs q 6 hrs and q4h PRN + Symbicort  - Solumedrol 60 daily switch to prednisone 50mg   - Mucinex bid   - CT angio chest: Evidence of small airways infection/inflammation in bilateral posterior upper lobes and posterior lower lobes, correlate for history of aspiration.    # Increasing elevated LFTs  -f/u US RUQ  -hold simvastatin  -trend lfts     # Lung Ca  - Oncologist Dr Malcolm - next chemo scheduled 11/30      #. HLD  - C/w home meds    #. Thrombocytopenia seems chronic   - Observe     # macrocytic anemia with elevated RDW  - iron profile: anemia of chronic disease  - vit b12:normal - Folat: normal    Pending: RUQ US, legionella urine, rpt procal

## 2023-11-20 NOTE — DISCHARGE NOTE PROVIDER - NSFOLLOWUPCLINICS_GEN_ALL_ED_FT
[facility]  Medicine  [address], [place]   Phone: [phone]  Fax:   Follow Up Time: 1 week

## 2023-11-20 NOTE — DISCHARGE NOTE PROVIDER - PROVIDER TOKENS
PROVIDER:[TOKEN:[[id number]],FOLLOWUP:[1 week]],PROVIDER:[TOKEN:[[id number]],FOLLOWUP:[1 week]] PROVIDER:[TOKEN:[[id number]],FOLLOWUP:[1 week]],PROVIDER:[TOKEN:[[id number]],FOLLOWUP:[1 week]],PROVIDER:[TOKEN:[[id number]],FOLLOWUP:[1 week]]

## 2023-11-21 ENCOUNTER — TRANSCRIPTION ENCOUNTER (OUTPATIENT)
Age: 78
End: 2023-11-21

## 2023-11-21 VITALS
TEMPERATURE: 96 F | OXYGEN SATURATION: 99 % | DIASTOLIC BLOOD PRESSURE: 71 MMHG | SYSTOLIC BLOOD PRESSURE: 135 MMHG | HEART RATE: 71 BPM

## 2023-11-21 LAB
ALBUMIN SERPL ELPH-MCNC: 2.7 G/DL — LOW (ref 3.5–5.2)
ALBUMIN SERPL ELPH-MCNC: 2.7 G/DL — LOW (ref 3.5–5.2)
ALP SERPL-CCNC: 129 U/L — HIGH (ref 30–115)
ALP SERPL-CCNC: 129 U/L — HIGH (ref 30–115)
ALT FLD-CCNC: 170 U/L — HIGH (ref 0–41)
ALT FLD-CCNC: 170 U/L — HIGH (ref 0–41)
ANION GAP SERPL CALC-SCNC: 12 MMOL/L — SIGNIFICANT CHANGE UP (ref 7–14)
ANION GAP SERPL CALC-SCNC: 12 MMOL/L — SIGNIFICANT CHANGE UP (ref 7–14)
AST SERPL-CCNC: 100 U/L — HIGH (ref 0–41)
AST SERPL-CCNC: 100 U/L — HIGH (ref 0–41)
BASOPHILS # BLD AUTO: 0.02 K/UL — SIGNIFICANT CHANGE UP (ref 0–0.2)
BASOPHILS # BLD AUTO: 0.02 K/UL — SIGNIFICANT CHANGE UP (ref 0–0.2)
BASOPHILS NFR BLD AUTO: 0.3 % — SIGNIFICANT CHANGE UP (ref 0–1)
BASOPHILS NFR BLD AUTO: 0.3 % — SIGNIFICANT CHANGE UP (ref 0–1)
BILIRUB SERPL-MCNC: <0.2 MG/DL — SIGNIFICANT CHANGE UP (ref 0.2–1.2)
BILIRUB SERPL-MCNC: <0.2 MG/DL — SIGNIFICANT CHANGE UP (ref 0.2–1.2)
BUN SERPL-MCNC: 27 MG/DL — HIGH (ref 10–20)
BUN SERPL-MCNC: 27 MG/DL — HIGH (ref 10–20)
CALCIUM SERPL-MCNC: 8.8 MG/DL — SIGNIFICANT CHANGE UP (ref 8.4–10.5)
CALCIUM SERPL-MCNC: 8.8 MG/DL — SIGNIFICANT CHANGE UP (ref 8.4–10.5)
CHLORIDE SERPL-SCNC: 106 MMOL/L — SIGNIFICANT CHANGE UP (ref 98–110)
CHLORIDE SERPL-SCNC: 106 MMOL/L — SIGNIFICANT CHANGE UP (ref 98–110)
CO2 SERPL-SCNC: 25 MMOL/L — SIGNIFICANT CHANGE UP (ref 17–32)
CO2 SERPL-SCNC: 25 MMOL/L — SIGNIFICANT CHANGE UP (ref 17–32)
CREAT SERPL-MCNC: 0.9 MG/DL — SIGNIFICANT CHANGE UP (ref 0.7–1.5)
CREAT SERPL-MCNC: 0.9 MG/DL — SIGNIFICANT CHANGE UP (ref 0.7–1.5)
EGFR: 86 ML/MIN/1.73M2 — SIGNIFICANT CHANGE UP
EGFR: 86 ML/MIN/1.73M2 — SIGNIFICANT CHANGE UP
EOSINOPHIL # BLD AUTO: 0.07 K/UL — SIGNIFICANT CHANGE UP (ref 0–0.7)
EOSINOPHIL # BLD AUTO: 0.07 K/UL — SIGNIFICANT CHANGE UP (ref 0–0.7)
EOSINOPHIL NFR BLD AUTO: 0.9 % — SIGNIFICANT CHANGE UP (ref 0–8)
EOSINOPHIL NFR BLD AUTO: 0.9 % — SIGNIFICANT CHANGE UP (ref 0–8)
GLUCOSE SERPL-MCNC: 103 MG/DL — HIGH (ref 70–99)
GLUCOSE SERPL-MCNC: 103 MG/DL — HIGH (ref 70–99)
HCT VFR BLD CALC: 30.1 % — LOW (ref 42–52)
HCT VFR BLD CALC: 30.1 % — LOW (ref 42–52)
HGB BLD-MCNC: 9.4 G/DL — LOW (ref 14–18)
HGB BLD-MCNC: 9.4 G/DL — LOW (ref 14–18)
IMM GRANULOCYTES NFR BLD AUTO: 1.6 % — HIGH (ref 0.1–0.3)
IMM GRANULOCYTES NFR BLD AUTO: 1.6 % — HIGH (ref 0.1–0.3)
LEGIONELLA AG UR QL: NEGATIVE — SIGNIFICANT CHANGE UP
LEGIONELLA AG UR QL: NEGATIVE — SIGNIFICANT CHANGE UP
LYMPHOCYTES # BLD AUTO: 2.64 K/UL — SIGNIFICANT CHANGE UP (ref 1.2–3.4)
LYMPHOCYTES # BLD AUTO: 2.64 K/UL — SIGNIFICANT CHANGE UP (ref 1.2–3.4)
LYMPHOCYTES # BLD AUTO: 34.2 % — SIGNIFICANT CHANGE UP (ref 20.5–51.1)
LYMPHOCYTES # BLD AUTO: 34.2 % — SIGNIFICANT CHANGE UP (ref 20.5–51.1)
MAGNESIUM SERPL-MCNC: 2 MG/DL — SIGNIFICANT CHANGE UP (ref 1.8–2.4)
MAGNESIUM SERPL-MCNC: 2 MG/DL — SIGNIFICANT CHANGE UP (ref 1.8–2.4)
MCHC RBC-ENTMCNC: 31.2 G/DL — LOW (ref 32–37)
MCHC RBC-ENTMCNC: 31.2 G/DL — LOW (ref 32–37)
MCHC RBC-ENTMCNC: 32 PG — HIGH (ref 27–31)
MCHC RBC-ENTMCNC: 32 PG — HIGH (ref 27–31)
MCV RBC AUTO: 102.4 FL — HIGH (ref 80–94)
MCV RBC AUTO: 102.4 FL — HIGH (ref 80–94)
MONOCYTES # BLD AUTO: 0.94 K/UL — HIGH (ref 0.1–0.6)
MONOCYTES # BLD AUTO: 0.94 K/UL — HIGH (ref 0.1–0.6)
MONOCYTES NFR BLD AUTO: 12.2 % — HIGH (ref 1.7–9.3)
MONOCYTES NFR BLD AUTO: 12.2 % — HIGH (ref 1.7–9.3)
MRSA PCR RESULT.: NEGATIVE — SIGNIFICANT CHANGE UP
MRSA PCR RESULT.: NEGATIVE — SIGNIFICANT CHANGE UP
NEUTROPHILS # BLD AUTO: 3.92 K/UL — SIGNIFICANT CHANGE UP (ref 1.4–6.5)
NEUTROPHILS # BLD AUTO: 3.92 K/UL — SIGNIFICANT CHANGE UP (ref 1.4–6.5)
NEUTROPHILS NFR BLD AUTO: 50.8 % — SIGNIFICANT CHANGE UP (ref 42.2–75.2)
NEUTROPHILS NFR BLD AUTO: 50.8 % — SIGNIFICANT CHANGE UP (ref 42.2–75.2)
NRBC # BLD: 0 /100 WBCS — SIGNIFICANT CHANGE UP (ref 0–0)
NRBC # BLD: 0 /100 WBCS — SIGNIFICANT CHANGE UP (ref 0–0)
PLATELET # BLD AUTO: 165 K/UL — SIGNIFICANT CHANGE UP (ref 130–400)
PLATELET # BLD AUTO: 165 K/UL — SIGNIFICANT CHANGE UP (ref 130–400)
PMV BLD: 9.5 FL — SIGNIFICANT CHANGE UP (ref 7.4–10.4)
PMV BLD: 9.5 FL — SIGNIFICANT CHANGE UP (ref 7.4–10.4)
POTASSIUM SERPL-MCNC: 5 MMOL/L — SIGNIFICANT CHANGE UP (ref 3.5–5)
POTASSIUM SERPL-MCNC: 5 MMOL/L — SIGNIFICANT CHANGE UP (ref 3.5–5)
POTASSIUM SERPL-SCNC: 5 MMOL/L — SIGNIFICANT CHANGE UP (ref 3.5–5)
POTASSIUM SERPL-SCNC: 5 MMOL/L — SIGNIFICANT CHANGE UP (ref 3.5–5)
PROCALCITONIN SERPL-MCNC: 0.13 NG/ML — HIGH (ref 0.02–0.1)
PROCALCITONIN SERPL-MCNC: 0.13 NG/ML — HIGH (ref 0.02–0.1)
PROT SERPL-MCNC: 5.8 G/DL — LOW (ref 6–8)
PROT SERPL-MCNC: 5.8 G/DL — LOW (ref 6–8)
RBC # BLD: 2.94 M/UL — LOW (ref 4.7–6.1)
RBC # BLD: 2.94 M/UL — LOW (ref 4.7–6.1)
RBC # FLD: 16.4 % — HIGH (ref 11.5–14.5)
RBC # FLD: 16.4 % — HIGH (ref 11.5–14.5)
SODIUM SERPL-SCNC: 143 MMOL/L — SIGNIFICANT CHANGE UP (ref 135–146)
SODIUM SERPL-SCNC: 143 MMOL/L — SIGNIFICANT CHANGE UP (ref 135–146)
WBC # BLD: 7.71 K/UL — SIGNIFICANT CHANGE UP (ref 4.8–10.8)
WBC # BLD: 7.71 K/UL — SIGNIFICANT CHANGE UP (ref 4.8–10.8)
WBC # FLD AUTO: 7.71 K/UL — SIGNIFICANT CHANGE UP (ref 4.8–10.8)
WBC # FLD AUTO: 7.71 K/UL — SIGNIFICANT CHANGE UP (ref 4.8–10.8)

## 2023-11-21 PROCEDURE — 99239 HOSP IP/OBS DSCHRG MGMT >30: CPT

## 2023-11-21 RX ORDER — ALBUTEROL 90 UG/1
3 AEROSOL, METERED ORAL
Qty: 99 | Refills: 0
Start: 2023-11-21 | End: 2023-12-20

## 2023-11-21 RX ORDER — ACETAMINOPHEN 500 MG
2 TABLET ORAL
Qty: 0 | Refills: 0 | DISCHARGE

## 2023-11-21 RX ORDER — SIMVASTATIN 20 MG/1
1 TABLET, FILM COATED ORAL
Qty: 0 | Refills: 0 | DISCHARGE

## 2023-11-21 RX ORDER — FUROSEMIDE 40 MG
1 TABLET ORAL
Qty: 5 | Refills: 0
Start: 2023-11-21 | End: 2023-11-25

## 2023-11-21 RX ORDER — FUROSEMIDE 40 MG
1 TABLET ORAL
Refills: 0 | DISCHARGE

## 2023-11-21 RX ORDER — SENNA PLUS 8.6 MG/1
2 TABLET ORAL
Qty: 60 | Refills: 0
Start: 2023-11-21 | End: 2023-12-20

## 2023-11-21 RX ADMIN — PANTOPRAZOLE SODIUM 40 MILLIGRAM(S): 20 TABLET, DELAYED RELEASE ORAL at 05:30

## 2023-11-21 RX ADMIN — Medication 600 MILLIGRAM(S): at 05:30

## 2023-11-21 RX ADMIN — Medication 20 MILLIGRAM(S): at 05:30

## 2023-11-21 RX ADMIN — Medication 50 MILLIGRAM(S): at 05:30

## 2023-11-21 RX ADMIN — Medication 1 MILLIGRAM(S): at 12:30

## 2023-11-21 RX ADMIN — Medication 2000 UNIT(S): at 12:29

## 2023-11-21 RX ADMIN — Medication 3 MILLILITER(S): at 08:05

## 2023-11-21 RX ADMIN — Medication 1 TABLET(S): at 12:29

## 2023-11-21 NOTE — DISCHARGE NOTE NURSING/CASE MANAGEMENT/SOCIAL WORK - NSDCPEFALRISK_GEN_ALL_CORE
For information on Fall & Injury Prevention, visit: [email or web address] OR  [email or web address] OR  https://www.cdc.gov/steadi/patient.html

## 2023-11-21 NOTE — DISCHARGE NOTE NURSING/CASE MANAGEMENT/SOCIAL WORK - PATIENT PORTAL LINK FT
You can access the FollowMyHealth Patient Portal offered by [facility] by registering at the following website: [email or web address]. By joining [employer]’s FollowMyHealth portal, you will also be able to view your health information using other applications (apps) compatible with our system.

## 2023-11-22 ENCOUNTER — APPOINTMENT (OUTPATIENT)
Dept: CARE COORDINATION | Facility: HOME HEALTH | Age: 78
End: 2023-11-22

## 2023-11-22 ENCOUNTER — TRANSCRIPTION ENCOUNTER (OUTPATIENT)
Age: 78
End: 2023-11-22

## 2023-11-22 LAB
HCOV PNL SPEC NAA+PROBE: DETECTED
HCOV PNL SPEC NAA+PROBE: DETECTED
RAPID RVP RESULT: DETECTED
RAPID RVP RESULT: DETECTED
SARS-COV-2 RNA SPEC QL NAA+PROBE: SIGNIFICANT CHANGE UP
SARS-COV-2 RNA SPEC QL NAA+PROBE: SIGNIFICANT CHANGE UP

## 2023-11-23 LAB
CULTURE RESULTS: SIGNIFICANT CHANGE UP
SPECIMEN SOURCE: SIGNIFICANT CHANGE UP

## 2023-11-24 ENCOUNTER — TRANSCRIPTION ENCOUNTER (OUTPATIENT)
Age: 78
End: 2023-11-24
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
## 2023-11-27 DIAGNOSIS — D63.0 ANEMIA IN NEOPLASTIC DISEASE: ICD-10-CM
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
## 2023-11-27 DIAGNOSIS — J90 PLEURAL EFFUSION, NOT ELSEWHERE CLASSIFIED: ICD-10-CM

## 2023-11-27 DIAGNOSIS — T36.1X5A ADVERSE EFFECT OF CEPHALOSPORINS AND OTHER BETA-LACTAM ANTIBIOTICS, INITIAL ENCOUNTER: ICD-10-CM

## 2023-11-27 DIAGNOSIS — T36.3X5A ADVERSE EFFECT OF MACROLIDES, INITIAL ENCOUNTER: ICD-10-CM

## 2023-11-27 DIAGNOSIS — C34.02 MALIGNANT NEOPLASM OF LEFT MAIN BRONCHUS: ICD-10-CM

## 2023-11-27 DIAGNOSIS — J44.1 CHRONIC OBSTRUCTIVE PULMONARY DISEASE WITH (ACUTE) EXACERBATION: ICD-10-CM

## 2023-11-27 DIAGNOSIS — Z87.891 PERSONAL HISTORY OF NICOTINE DEPENDENCE: ICD-10-CM

## 2023-11-27 DIAGNOSIS — R59.0 LOCALIZED ENLARGED LYMPH NODES: ICD-10-CM

## 2023-11-27 DIAGNOSIS — D69.6 THROMBOCYTOPENIA, UNSPECIFIED: ICD-10-CM

## 2023-11-27 DIAGNOSIS — Z85.828 PERSONAL HISTORY OF OTHER MALIGNANT NEOPLASM OF SKIN: ICD-10-CM

## 2023-11-27 DIAGNOSIS — J96.01 ACUTE RESPIRATORY FAILURE WITH HYPOXIA: ICD-10-CM

## 2023-11-27 DIAGNOSIS — Z79.899 OTHER LONG TERM (CURRENT) DRUG THERAPY: ICD-10-CM

## 2023-11-27 DIAGNOSIS — Z85.820 PERSONAL HISTORY OF MALIGNANT MELANOMA OF SKIN: ICD-10-CM

## 2023-11-27 DIAGNOSIS — K71.10 TOXIC LIVER DISEASE WITH HEPATIC NECROSIS, WITHOUT COMA: ICD-10-CM

## 2023-11-27 DIAGNOSIS — J69.0 PNEUMONITIS DUE TO INHALATION OF FOOD AND VOMIT: ICD-10-CM

## 2023-12-01 ENCOUNTER — TRANSCRIPTION ENCOUNTER (OUTPATIENT)
Age: 78
End: 2023-12-01

## 2023-12-06 ENCOUNTER — TRANSCRIPTION ENCOUNTER (OUTPATIENT)
Age: 78
End: 2023-12-06

## 2023-12-15 ENCOUNTER — TRANSCRIPTION ENCOUNTER (OUTPATIENT)
Age: 78
End: 2023-12-15

## 2024-01-01 NOTE — ASU PREOP CHECKLIST - TEMPERATURE IN CELSIUS (DEGREES C)
[Today's Date] : [unfilled] [Name] : Name: [unfilled] [] : : ~~ [Today's Date:] : [unfilled] [Dear  ___:] : Dear Dr. [unfilled]: [Consult] : I had the pleasure of evaluating your patient, [unfilled]. My full evaluation follows. [Consult - Single Provider] : Thank you very much for allowing me to participate in the care of this patient. If you have any questions, please do not hesitate to contact me. [Sincerely,] : Sincerely, [FreeTextEntry4] : Diana Chappell MD [FreeTextEntry5] : [address] [address] [FreeTextEntry6] : [place] [address] [de-identified] : Wesley Connolly MD, FAAP, FACC, FASE  Pediatric Cardiologist [facility] 36.2

## 2024-12-28 ENCOUNTER — INPATIENT (INPATIENT)
Facility: HOSPITAL | Age: 79
LOS: 3 days | Discharge: HOME | DRG: 871 | End: 2025-01-01
Attending: STUDENT IN AN ORGANIZED HEALTH CARE EDUCATION/TRAINING PROGRAM | Admitting: FAMILY MEDICINE
Payer: COMMERCIAL

## 2024-12-28 VITALS
SYSTOLIC BLOOD PRESSURE: 117 MMHG | RESPIRATION RATE: 30 BRPM | OXYGEN SATURATION: 87 % | WEIGHT: 175.05 LBS | TEMPERATURE: 99 F | DIASTOLIC BLOOD PRESSURE: 67 MMHG | HEART RATE: 109 BPM

## 2024-12-28 DIAGNOSIS — Z98.890 OTHER SPECIFIED POSTPROCEDURAL STATES: Chronic | ICD-10-CM

## 2024-12-28 DIAGNOSIS — H26.40 UNSPECIFIED SECONDARY CATARACT: Chronic | ICD-10-CM

## 2024-12-28 DIAGNOSIS — Z93.8 OTHER ARTIFICIAL OPENING STATUS: Chronic | ICD-10-CM

## 2024-12-28 LAB
ALBUMIN SERPL ELPH-MCNC: 3.3 G/DL — LOW (ref 3.5–5.2)
ALP SERPL-CCNC: 103 U/L — SIGNIFICANT CHANGE UP (ref 30–115)
ALT FLD-CCNC: 18 U/L — SIGNIFICANT CHANGE UP (ref 0–41)
ANION GAP SERPL CALC-SCNC: 9 MMOL/L — SIGNIFICANT CHANGE UP (ref 7–14)
AST SERPL-CCNC: 21 U/L — SIGNIFICANT CHANGE UP (ref 0–41)
BASOPHILS # BLD AUTO: 0.05 K/UL — SIGNIFICANT CHANGE UP (ref 0–0.2)
BASOPHILS NFR BLD AUTO: 0.4 % — SIGNIFICANT CHANGE UP (ref 0–1)
BILIRUB SERPL-MCNC: 0.3 MG/DL — SIGNIFICANT CHANGE UP (ref 0.2–1.2)
BUN SERPL-MCNC: 19 MG/DL — SIGNIFICANT CHANGE UP (ref 10–20)
CALCIUM SERPL-MCNC: 8.5 MG/DL — SIGNIFICANT CHANGE UP (ref 8.4–10.5)
CHLORIDE SERPL-SCNC: 102 MMOL/L — SIGNIFICANT CHANGE UP (ref 98–110)
CO2 SERPL-SCNC: 27 MMOL/L — SIGNIFICANT CHANGE UP (ref 17–32)
CREAT SERPL-MCNC: 0.9 MG/DL — SIGNIFICANT CHANGE UP (ref 0.7–1.5)
EGFR: 86 ML/MIN/1.73M2 — SIGNIFICANT CHANGE UP
EOSINOPHIL # BLD AUTO: 0.08 K/UL — SIGNIFICANT CHANGE UP (ref 0–0.7)
EOSINOPHIL NFR BLD AUTO: 0.7 % — SIGNIFICANT CHANGE UP (ref 0–8)
FLUAV AG NPH QL: SIGNIFICANT CHANGE UP
FLUBV AG NPH QL: SIGNIFICANT CHANGE UP
GAS PNL BLDV: SIGNIFICANT CHANGE UP
GLUCOSE SERPL-MCNC: 110 MG/DL — HIGH (ref 70–99)
HCT VFR BLD CALC: 39.2 % — LOW (ref 42–52)
HGB BLD-MCNC: 12.5 G/DL — LOW (ref 14–18)
IMM GRANULOCYTES NFR BLD AUTO: 0.4 % — HIGH (ref 0.1–0.3)
LYMPHOCYTES # BLD AUTO: 3.74 K/UL — HIGH (ref 1.2–3.4)
LYMPHOCYTES # BLD AUTO: 33.2 % — SIGNIFICANT CHANGE UP (ref 20.5–51.1)
MCHC RBC-ENTMCNC: 29.8 PG — SIGNIFICANT CHANGE UP (ref 27–31)
MCHC RBC-ENTMCNC: 31.9 G/DL — LOW (ref 32–37)
MCV RBC AUTO: 93.3 FL — SIGNIFICANT CHANGE UP (ref 80–94)
MONOCYTES # BLD AUTO: 1.16 K/UL — HIGH (ref 0.1–0.6)
MONOCYTES NFR BLD AUTO: 10.3 % — HIGH (ref 1.7–9.3)
NEUTROPHILS # BLD AUTO: 6.17 K/UL — SIGNIFICANT CHANGE UP (ref 1.4–6.5)
NEUTROPHILS NFR BLD AUTO: 55 % — SIGNIFICANT CHANGE UP (ref 42.2–75.2)
NRBC # BLD: 0 /100 WBCS — SIGNIFICANT CHANGE UP (ref 0–0)
PLATELET # BLD AUTO: 270 K/UL — SIGNIFICANT CHANGE UP (ref 130–400)
PMV BLD: 8.3 FL — SIGNIFICANT CHANGE UP (ref 7.4–10.4)
POTASSIUM SERPL-MCNC: 4.7 MMOL/L — SIGNIFICANT CHANGE UP (ref 3.5–5)
POTASSIUM SERPL-SCNC: 4.7 MMOL/L — SIGNIFICANT CHANGE UP (ref 3.5–5)
PROT SERPL-MCNC: 6.4 G/DL — SIGNIFICANT CHANGE UP (ref 6–8)
RBC # BLD: 4.2 M/UL — LOW (ref 4.7–6.1)
RBC # FLD: 15 % — HIGH (ref 11.5–14.5)
RSV RNA NPH QL NAA+NON-PROBE: SIGNIFICANT CHANGE UP
SARS-COV-2 RNA SPEC QL NAA+PROBE: SIGNIFICANT CHANGE UP
SODIUM SERPL-SCNC: 138 MMOL/L — SIGNIFICANT CHANGE UP (ref 135–146)
WBC # BLD: 11.25 K/UL — HIGH (ref 4.8–10.8)
WBC # FLD AUTO: 11.25 K/UL — HIGH (ref 4.8–10.8)

## 2024-12-28 PROCEDURE — 71275 CT ANGIOGRAPHY CHEST: CPT | Mod: 26,MC

## 2024-12-28 PROCEDURE — 99285 EMERGENCY DEPT VISIT HI MDM: CPT | Mod: FS

## 2024-12-28 PROCEDURE — 93010 ELECTROCARDIOGRAM REPORT: CPT

## 2024-12-28 PROCEDURE — 71045 X-RAY EXAM CHEST 1 VIEW: CPT | Mod: 26

## 2024-12-28 RX ORDER — METHYLPREDNISOLONE 4 MG/1
125 TABLET ORAL ONCE
Refills: 0 | Status: COMPLETED | OUTPATIENT
Start: 2024-12-28 | End: 2024-12-28

## 2024-12-28 RX ORDER — ACETAMINOPHEN 80 MG/.8ML
650 SOLUTION/ DROPS ORAL ONCE
Refills: 0 | Status: COMPLETED | OUTPATIENT
Start: 2024-12-28 | End: 2024-12-28

## 2024-12-28 RX ORDER — IPRATROPIUM BROMIDE AND ALBUTEROL SULFATE .5; 2.5 MG/3ML; MG/3ML
3 SOLUTION RESPIRATORY (INHALATION)
Refills: 0 | Status: COMPLETED | OUTPATIENT
Start: 2024-12-28 | End: 2024-12-28

## 2024-12-28 RX ORDER — SODIUM CHLORIDE 9 MG/ML
1000 INJECTION, SOLUTION INTRAVENOUS ONCE
Refills: 0 | Status: COMPLETED | OUTPATIENT
Start: 2024-12-28 | End: 2024-12-28

## 2024-12-28 RX ORDER — AZITHROMYCIN MONOHYDRATE 200 MG/5ML
500 POWDER, FOR SUSPENSION ORAL ONCE
Refills: 0 | Status: COMPLETED | OUTPATIENT
Start: 2024-12-28 | End: 2024-12-28

## 2024-12-28 RX ORDER — CEFTRIAXONE SODIUM 1 G/1
1000 INJECTION, POWDER, FOR SOLUTION INTRAMUSCULAR; INTRAVENOUS ONCE
Refills: 0 | Status: COMPLETED | OUTPATIENT
Start: 2024-12-28 | End: 2024-12-28

## 2024-12-28 RX ADMIN — SODIUM CHLORIDE 1000 MILLILITER(S): 9 INJECTION, SOLUTION INTRAVENOUS at 23:21

## 2024-12-28 RX ADMIN — ACETAMINOPHEN 650 MILLIGRAM(S): 80 SOLUTION/ DROPS ORAL at 19:17

## 2024-12-28 RX ADMIN — METHYLPREDNISOLONE 125 MILLIGRAM(S): 4 TABLET ORAL at 20:00

## 2024-12-28 RX ADMIN — IPRATROPIUM BROMIDE AND ALBUTEROL SULFATE 3 MILLILITER(S): .5; 2.5 SOLUTION RESPIRATORY (INHALATION) at 19:19

## 2024-12-28 RX ADMIN — AZITHROMYCIN MONOHYDRATE 255 MILLIGRAM(S): 200 POWDER, FOR SUSPENSION ORAL at 23:42

## 2024-12-28 RX ADMIN — IPRATROPIUM BROMIDE AND ALBUTEROL SULFATE 3 MILLILITER(S): .5; 2.5 SOLUTION RESPIRATORY (INHALATION) at 19:16

## 2024-12-28 RX ADMIN — IPRATROPIUM BROMIDE AND ALBUTEROL SULFATE 3 MILLILITER(S): .5; 2.5 SOLUTION RESPIRATORY (INHALATION) at 20:01

## 2024-12-28 NOTE — ED ADULT TRIAGE NOTE - SOURCE OF INFORMATION
Entered by Lia Wilburn CMA on February 04, 2021 12:34:58 PM CST  ---------------------  From: Lia Wilburn CMA   To: [employer] #[id number]    [employer] #[id number]    Signed by Lia Wilburn Lia Wilburn CMA            ------------------------------------------  From: [facility] #[id number]  To: Paul Mendoza Patient

## 2024-12-28 NOTE — ED ADULT TRIAGE NOTE - WEIGHT IN KG
Yojana Maldonado, APRN - CNP  [facility] Clinical Staff  Caller: Unspecified (2 days ago,  2:37 PM)  Please notify patient, if agreeable to set up transportation, we can put a referral into an endocrinologist out of town. Please let me know what he prefers.   79.4

## 2024-12-29 DIAGNOSIS — A41.9 SEPSIS, UNSPECIFIED ORGANISM: ICD-10-CM

## 2024-12-29 LAB
ALBUMIN SERPL ELPH-MCNC: 3.2 G/DL — LOW (ref 3.5–5.2)
ALP SERPL-CCNC: 100 U/L — SIGNIFICANT CHANGE UP (ref 30–115)
ALT FLD-CCNC: 17 U/L — SIGNIFICANT CHANGE UP (ref 0–41)
ANION GAP SERPL CALC-SCNC: 8 MMOL/L — SIGNIFICANT CHANGE UP (ref 7–14)
AST SERPL-CCNC: 21 U/L — SIGNIFICANT CHANGE UP (ref 0–41)
BILIRUB SERPL-MCNC: <0.2 MG/DL — SIGNIFICANT CHANGE UP (ref 0.2–1.2)
BUN SERPL-MCNC: 17 MG/DL — SIGNIFICANT CHANGE UP (ref 10–20)
CALCIUM SERPL-MCNC: 8.8 MG/DL — SIGNIFICANT CHANGE UP (ref 8.4–10.5)
CHLORIDE SERPL-SCNC: 104 MMOL/L — SIGNIFICANT CHANGE UP (ref 98–110)
CO2 SERPL-SCNC: 28 MMOL/L — SIGNIFICANT CHANGE UP (ref 17–32)
CREAT SERPL-MCNC: 0.8 MG/DL — SIGNIFICANT CHANGE UP (ref 0.7–1.5)
EGFR: 89 ML/MIN/1.73M2 — SIGNIFICANT CHANGE UP
GLUCOSE SERPL-MCNC: 144 MG/DL — HIGH (ref 70–99)
GRAM STN FLD: ABNORMAL
HCT VFR BLD CALC: 38.8 % — LOW (ref 42–52)
HGB BLD-MCNC: 12.1 G/DL — LOW (ref 14–18)
MCHC RBC-ENTMCNC: 29.7 PG — SIGNIFICANT CHANGE UP (ref 27–31)
MCHC RBC-ENTMCNC: 31.2 G/DL — LOW (ref 32–37)
MCV RBC AUTO: 95.1 FL — HIGH (ref 80–94)
MRSA PCR RESULT.: NEGATIVE — SIGNIFICANT CHANGE UP
NRBC # BLD: 0 /100 WBCS — SIGNIFICANT CHANGE UP (ref 0–0)
NT-PROBNP SERPL-SCNC: 1439 PG/ML — HIGH (ref 0–300)
PLATELET # BLD AUTO: 260 K/UL — SIGNIFICANT CHANGE UP (ref 130–400)
PMV BLD: 9 FL — SIGNIFICANT CHANGE UP (ref 7.4–10.4)
POTASSIUM SERPL-MCNC: 5.6 MMOL/L — HIGH (ref 3.5–5)
POTASSIUM SERPL-SCNC: 5.6 MMOL/L — HIGH (ref 3.5–5)
PROT SERPL-MCNC: 6.2 G/DL — SIGNIFICANT CHANGE UP (ref 6–8)
RBC # BLD: 4.08 M/UL — LOW (ref 4.7–6.1)
RBC # FLD: 14.8 % — HIGH (ref 11.5–14.5)
SODIUM SERPL-SCNC: 140 MMOL/L — SIGNIFICANT CHANGE UP (ref 135–146)
SPECIMEN SOURCE: SIGNIFICANT CHANGE UP
WBC # BLD: 6.26 K/UL — SIGNIFICANT CHANGE UP (ref 4.8–10.8)
WBC # FLD AUTO: 6.26 K/UL — SIGNIFICANT CHANGE UP (ref 4.8–10.8)

## 2024-12-29 PROCEDURE — 80048 BASIC METABOLIC PNL TOTAL CA: CPT

## 2024-12-29 PROCEDURE — 87640 STAPH A DNA AMP PROBE: CPT

## 2024-12-29 PROCEDURE — 87205 SMEAR GRAM STAIN: CPT

## 2024-12-29 PROCEDURE — 87449 NOS EACH ORGANISM AG IA: CPT

## 2024-12-29 PROCEDURE — 80053 COMPREHEN METABOLIC PANEL: CPT

## 2024-12-29 PROCEDURE — 87641 MR-STAPH DNA AMP PROBE: CPT

## 2024-12-29 PROCEDURE — 99223 1ST HOSP IP/OBS HIGH 75: CPT

## 2024-12-29 PROCEDURE — 0225U NFCT DS DNA&RNA 21 SARSCOV2: CPT

## 2024-12-29 PROCEDURE — 36415 COLL VENOUS BLD VENIPUNCTURE: CPT

## 2024-12-29 PROCEDURE — 83880 ASSAY OF NATRIURETIC PEPTIDE: CPT

## 2024-12-29 PROCEDURE — 93307 TTE W/O DOPPLER COMPLETE: CPT

## 2024-12-29 PROCEDURE — 94640 AIRWAY INHALATION TREATMENT: CPT

## 2024-12-29 PROCEDURE — 87899 AGENT NOS ASSAY W/OPTIC: CPT

## 2024-12-29 PROCEDURE — 87070 CULTURE OTHR SPECIMN AEROBIC: CPT

## 2024-12-29 PROCEDURE — 85027 COMPLETE CBC AUTOMATED: CPT

## 2024-12-29 RX ORDER — SODIUM CHLORIDE 9 MG/ML
1000 INJECTION, SOLUTION INTRAVENOUS ONCE
Refills: 0 | Status: COMPLETED | OUTPATIENT
Start: 2024-12-29 | End: 2024-12-29

## 2024-12-29 RX ORDER — PANTOPRAZOLE 40 MG/1
40 TABLET, DELAYED RELEASE ORAL
Refills: 0 | Status: DISCONTINUED | OUTPATIENT
Start: 2024-12-29 | End: 2025-01-01

## 2024-12-29 RX ORDER — VITAMIN A 10000 UNIT
1 TABLET ORAL DAILY
Refills: 0 | Status: DISCONTINUED | OUTPATIENT
Start: 2024-12-29 | End: 2025-01-01

## 2024-12-29 RX ORDER — FLUTICASONE PROPIONATE AND SALMETEROL 50; 500 UG/1; UG/1
1 POWDER ORAL; RESPIRATORY (INHALATION)
Refills: 0 | Status: DISCONTINUED | OUTPATIENT
Start: 2024-12-29 | End: 2024-12-29

## 2024-12-29 RX ORDER — AZITHROMYCIN MONOHYDRATE 200 MG/5ML
500 POWDER, FOR SUSPENSION ORAL EVERY 24 HOURS
Refills: 0 | Status: DISCONTINUED | OUTPATIENT
Start: 2024-12-29 | End: 2025-01-01

## 2024-12-29 RX ORDER — BUDESONIDE AND FORMOTEROL FUMARATE 160; 4.5 UG/1; UG/1
2 AEROSOL, METERED RESPIRATORY (INHALATION)
Refills: 0 | Status: DISCONTINUED | OUTPATIENT
Start: 2024-12-29 | End: 2025-01-01

## 2024-12-29 RX ORDER — IPRATROPIUM BROMIDE AND ALBUTEROL SULFATE .5; 2.5 MG/3ML; MG/3ML
3 SOLUTION RESPIRATORY (INHALATION) EVERY 6 HOURS
Refills: 0 | Status: DISCONTINUED | OUTPATIENT
Start: 2024-12-29 | End: 2025-01-01

## 2024-12-29 RX ORDER — MONTELUKAST SODIUM 10 MG/1
10 TABLET, FILM COATED ORAL DAILY
Refills: 0 | Status: DISCONTINUED | OUTPATIENT
Start: 2024-12-29 | End: 2025-01-01

## 2024-12-29 RX ORDER — HEPARIN SODIUM 1000 [USP'U]/ML
5000 INJECTION, SOLUTION INTRAVENOUS; SUBCUTANEOUS EVERY 12 HOURS
Refills: 0 | Status: DISCONTINUED | OUTPATIENT
Start: 2024-12-29 | End: 2025-01-01

## 2024-12-29 RX ORDER — ACETAMINOPHEN 80 MG/.8ML
650 SOLUTION/ DROPS ORAL EVERY 6 HOURS
Refills: 0 | Status: DISCONTINUED | OUTPATIENT
Start: 2024-12-29 | End: 2025-01-01

## 2024-12-29 RX ORDER — ALBUTEROL SULFATE 90 UG/1
2 INHALANT RESPIRATORY (INHALATION) EVERY 4 HOURS
Refills: 0 | Status: DISCONTINUED | OUTPATIENT
Start: 2024-12-29 | End: 2025-01-01

## 2024-12-29 RX ORDER — SENNOSIDES 8.6 MG/1
2 TABLET, FILM COATED ORAL AT BEDTIME
Refills: 0 | Status: DISCONTINUED | OUTPATIENT
Start: 2024-12-29 | End: 2025-01-01

## 2024-12-29 RX ORDER — SODIUM ZIRCONIUM CYCLOSILICATE 10 G/10G
10 POWDER, FOR SUSPENSION ORAL ONCE
Refills: 0 | Status: COMPLETED | OUTPATIENT
Start: 2024-12-29 | End: 2024-12-29

## 2024-12-29 RX ORDER — INFLUENZA A VIRUS A/WISCONSIN/588/2019 (H1N1) RECOMBINANT HEMAGGLUTININ ANTIGEN, INFLUENZA A VIRUS A/DARWIN/6/2021 (H3N2) RECOMBINANT HEMAGGLUTININ ANTIGEN, INFLUENZA B VIRUS B/AUSTRIA/1359417/2021 RECOMBINANT HEMAGGLUTININ ANTIGEN, AND INFLUENZA B VIRUS B/PHUKET/3073/2013 RECOMBINANT HEMAGGLUTININ ANTIGEN 45; 45; 45; 45 UG/.5ML; UG/.5ML; UG/.5ML; UG/.5ML
0.5 INJECTION INTRAMUSCULAR ONCE
Refills: 0 | Status: DISCONTINUED | OUTPATIENT
Start: 2024-12-29 | End: 2025-01-01

## 2024-12-29 RX ORDER — ONDANSETRON 4 MG/1
4 TABLET ORAL EVERY 6 HOURS
Refills: 0 | Status: DISCONTINUED | OUTPATIENT
Start: 2024-12-29 | End: 2025-01-01

## 2024-12-29 RX ADMIN — HEPARIN SODIUM 5000 UNIT(S): 1000 INJECTION, SOLUTION INTRAVENOUS; SUBCUTANEOUS at 17:36

## 2024-12-29 RX ADMIN — Medication 100 MILLIGRAM(S): at 02:29

## 2024-12-29 RX ADMIN — PANTOPRAZOLE 40 MILLIGRAM(S): 40 TABLET, DELAYED RELEASE ORAL at 05:44

## 2024-12-29 RX ADMIN — Medication 100 MILLIGRAM(S): at 14:21

## 2024-12-29 RX ADMIN — SODIUM ZIRCONIUM CYCLOSILICATE 10 GRAM(S): 10 POWDER, FOR SUSPENSION ORAL at 14:20

## 2024-12-29 RX ADMIN — IPRATROPIUM BROMIDE AND ALBUTEROL SULFATE 3 MILLILITER(S): .5; 2.5 SOLUTION RESPIRATORY (INHALATION) at 07:56

## 2024-12-29 RX ADMIN — MONTELUKAST SODIUM 10 MILLIGRAM(S): 10 TABLET, FILM COATED ORAL at 16:29

## 2024-12-29 RX ADMIN — AZITHROMYCIN MONOHYDRATE 255 MILLIGRAM(S): 200 POWDER, FOR SUSPENSION ORAL at 02:29

## 2024-12-29 RX ADMIN — SODIUM CHLORIDE 1000 MILLILITER(S): 9 INJECTION, SOLUTION INTRAVENOUS at 01:38

## 2024-12-29 RX ADMIN — BUDESONIDE AND FORMOTEROL FUMARATE 2 PUFF(S): 160; 4.5 AEROSOL, METERED RESPIRATORY (INHALATION) at 17:36

## 2024-12-29 RX ADMIN — IPRATROPIUM BROMIDE AND ALBUTEROL SULFATE 3 MILLILITER(S): .5; 2.5 SOLUTION RESPIRATORY (INHALATION) at 02:30

## 2024-12-29 RX ADMIN — Medication 100 MILLIGRAM(S): at 22:07

## 2024-12-29 RX ADMIN — IPRATROPIUM BROMIDE AND ALBUTEROL SULFATE 3 MILLILITER(S): .5; 2.5 SOLUTION RESPIRATORY (INHALATION) at 20:42

## 2024-12-29 RX ADMIN — HEPARIN SODIUM 5000 UNIT(S): 1000 INJECTION, SOLUTION INTRAVENOUS; SUBCUTANEOUS at 05:45

## 2024-12-29 RX ADMIN — IPRATROPIUM BROMIDE AND ALBUTEROL SULFATE 3 MILLILITER(S): .5; 2.5 SOLUTION RESPIRATORY (INHALATION) at 14:39

## 2024-12-29 RX ADMIN — SODIUM CHLORIDE 1000 MILLILITER(S): 9 INJECTION, SOLUTION INTRAVENOUS at 01:14

## 2024-12-29 RX ADMIN — Medication 100 MILLIGRAM(S): at 05:45

## 2024-12-29 RX ADMIN — CEFTRIAXONE SODIUM 100 MILLIGRAM(S): 1 INJECTION, POWDER, FOR SOLUTION INTRAMUSCULAR; INTRAVENOUS at 01:17

## 2024-12-29 NOTE — CONSULT NOTE ADULT - ASSESSMENT
JAMEL WATERS is a 79y man with a medical history significant for COPD on home oxygen and lung cancer on immunotherapy who presented initially with shortness of breath and cough for 3 days, now admitted with hypoxemia and pneumonia.  Pulmonary has been consulted for further evaluation.      IMPRESSION:    # Acute on chronic respiratory failure  # Sepsis POA  # COPD  # Lung cancer on immunotherapy  - no evidence of drug pneumonitis on CT    RECOMMENDATIONS:    - Check expanded RVP  - Agree with empiric coverage for CAP  - Wean FiO2 as tolerated to maintain spO2 92-96%   - Check troponin & BNP, TTE if elevated  - Advair BID, duonebs standing      ********************************* INCOMPLETE NOTE ********************************   JAMEL WATERS is a 79y man with a medical history significant for COPD on home oxygen and lung cancer on immunotherapy who presented initially with shortness of breath and cough for 3 days, now admitted with hypoxemia and pneumonia.  Pulmonary has been consulted for further evaluation.      IMPRESSION:    # Acute on chronic respiratory failure  # Sepsis POA  # COPD  # Lung cancer on immunotherapy  - no evidence of drug pneumonitis on CT, patient denies using checkpoint inhibitor therapy    RECOMMENDATIONS:    - Check expanded RVP  - Legionella, strep antigen, sputum culture  - Agree with empiric coverage for CAP for now, follow-up ID recommendations  - Wean FiO2 as tolerated to maintain spO2 92-96%   - Check troponin & BNP, obtain TTE if elevated  - Advair BID, nick standing

## 2024-12-29 NOTE — PROGRESS NOTE ADULT - ASSESSMENT
79-year-old male past medical history of COPD with home O2 as needed, active lung CA on immunotherapy, recently finished chemo, presents with worsening shortness of breath and cough x 3 days, with associated chills. Endorses shortness of breath with exertion. Admitted for acute on chronic hypoxic respiratory failure 2/2 PNA.    #Acute on chronic hypoxic respiratory failure  #Sepsis on presentation 2/2 PNA  #hx of COPD on home O2 as needed  #Lung Ca on immunotherapy  - CTA chest: No PE. Bilateral scattered patchy and nodular airspace opacities with more confluent left lower lobe consolidative opacification. Small left pleural effusion. No evidence of acute pulmonary embolism.   - no evidence of drug pneumonitis on CT, patient denies using checkpoint inhibitor therapy  - on 3L NC  - Flu/RSV/COVID negative  - MRSA negative  - c/w cefepime, azithromycin  - c/w duonebs, advair (or symbicort if patient uses his own)  - f/u blood cultures, sputum cultures, legionella/strep  - check RVP, BNP  - wean O2 as tolerated    Misc:  DVT ppx: heparin subQ  GI ppx: Protonix  Diet: DASH/TLC  Activity: IAT  Code: Full Code  Dispo: Acute     Pending/Handoff: f/u infectious workup (cultures, RVP), pending improvement in O2 79-year-old male past medical history of COPD with home O2 as needed, active lung CA on immunotherapy, recently finished chemo, presents with worsening shortness of breath and cough x 3 days, with associated chills. Endorses shortness of breath with exertion. Admitted for acute on chronic hypoxic respiratory failure 2/2 PNA.    #Acute on chronic hypoxic respiratory failure  #Sepsis on presentation 2/2 PNA  #hx of COPD on home O2 as needed  #Lung Ca on immunotherapy  - CTA chest: No PE. Bilateral scattered patchy and nodular airspace opacities with more confluent left lower lobe consolidative opacification. Small left pleural effusion. No evidence of acute pulmonary embolism.   - no evidence of drug pneumonitis on CT, patient denies using checkpoint inhibitor therapy  - on 3L NC  - Flu/RSV/COVID negative  - MRSA negative  - c/w cefepime, azithromycin  - c/w duonebs, advair (or symbicort if patient uses his own), singulair  - f/u blood cultures, sputum cultures, legionella/strep  - check RVP, BNP  - wean O2 as tolerated    Misc:  DVT ppx: heparin subQ  GI ppx: Protonix  Diet: DASH/TLC  Activity: IAT  Code: Full Code  Dispo: Acute     Pending/Handoff: f/u infectious workup (cultures, RVP), pending improvement in O2 79-year-old male past medical history of COPD with home O2 as needed, active lung CA on immunotherapy, recently finished chemo, presents with worsening shortness of breath and cough x 3 days, with associated chills. Endorses shortness of breath with exertion. Admitted for acute on chronic hypoxic respiratory failure 2/2 PNA.    #Acute on chronic hypoxic respiratory failure  #Sepsis on presentation 2/2 PNA  #hx of COPD on home O2 as needed  #Lung Ca on immunotherapy  - CTA chest: No PE. Bilateral scattered patchy and nodular airspace opacities with more confluent left lower lobe consolidative opacification. Small left pleural effusion. No evidence of acute pulmonary embolism.   - no evidence of drug pneumonitis on CT, patient denies using checkpoint inhibitor therapy  - on 3L NC  - Flu/RSV/COVID negative  - MRSA negative  - c/w cefepime, azithromycin  - c/w duonebs, advair (or symbicort if patient uses his own), singulair  - f/u blood cultures, sputum cultures, legionella/strep  - check RVP, BNP  - wean O2 as tolerated    Pt covered by [facility] insurance for melanoma, basal cell carcinoma, COPD, and Lung cancer.    Misc:  DVT ppx: heparin subQ  GI ppx: Protonix  Diet: DASH/TLC  Activity: IAT  Code: Full Code  Dispo: Acute     Pending/Handoff: f/u infectious workup (cultures, RVP), pending improvement in O2

## 2024-12-29 NOTE — CONSULT NOTE ADULT - SUBJECTIVE AND OBJECTIVE BOX
Patient is a 79y old  Male who presents with a chief complaint of Cough (29 Dec 2024 01:00)      HPI:  79-year-old male past medical history of COPD with home O2 as needed, active lung CA on immunotherapy, recently finished chemo, presents with worsening shortness of breath and cough X 3 days.  Patient reports chills, did not take his temperature.  Patient and grandson report patient becomes profoundly more short of breath when walking.  Denies chest pain, back pain, abdominal pain, vomiting.  Denies leg pain/swelling, history of blood clots. (29 Dec 2024 01:00)      PAST MEDICAL & SURGICAL HISTORY:  COPD, mild      Smoker      Melanoma of skin      Basal cell carcinoma      Lung cancer      Pneumothorax, post biopsy, left      H/O carpal tunnel repair      H/O basal cell carcinoma excision      H/O melanoma excision      After cataract, bilateral      S/P chest tube placement          SOCIAL HX:   Smoking                         ETOH                            Other    FAMILY HISTORY:  .  No cardiovascular or pulmonary family history     REVIEW OF SYSTEMS:    All ROS are negative exept per HPI       Allergies    No Known Allergies    Intolerances          PHYSICAL EXAM  Vital Signs Last 24 Hrs  T(C): 36.4 (29 Dec 2024 04:20), Max: 37.2 (28 Dec 2024 16:33)  T(F): 97.6 (29 Dec 2024 04:20), Max: 99 (28 Dec 2024 16:33)  HR: 94 (29 Dec 2024 04:20) (92 - 109)  BP: 116/69 (29 Dec 2024 04:20) (116/69 - 176/81)  BP(mean): --  RR: 18 (29 Dec 2024 04:20) (18 - 30)  SpO2: 98% (29 Dec 2024 04:20) (87% - 98%)    Parameters below as of 29 Dec 2024 04:20  Patient On (Oxygen Delivery Method): nasal cannula  O2 Flow (L/min): 3      Constitutional: no acute distress, well nourished well developed  Neuro: moving all 4 limbs spontaneously, no facial droop or dysarthria  HEENT: NCAT, anicteric  Neck: no visible lymphadenopathy or goiter  Pulm: no respiratory distress. clear to auscultation bilaterally  Cardiac: extremities appear pink and well-perfused.  regular rhythm and rate, no murmur detected  Abdomen: non-distended  Extremities: no peripheral edema  Skin: no visible rashes          LABS:                          12.5   11.25 )-----------( 270      ( 28 Dec 2024 19:50 )             39.2                                               12-28    138  |  102  |  19  ----------------------------<  110[H]  4.7   |  27  |  0.9    Ca    8.5      28 Dec 2024 19:50    TPro  6.4  /  Alb  3.3[L]  /  TBili  0.3  /  DBili  x   /  AST  21  /  ALT  18  /  AlkPhos  103  12-28                                             Urinalysis Basic - ( 28 Dec 2024 19:50 )    Color: x / Appearance: x / SG: x / pH: x  Gluc: 110 mg/dL / Ketone: x  / Bili: x / Urobili: x   Blood: x / Protein: x / Nitrite: x   Leuk Esterase: x / RBC: x / WBC x   Sq Epi: x / Non Sq Epi: x / Bacteria: x                                                  LIVER FUNCTIONS - ( 28 Dec 2024 19:50 )  Alb: 3.3 g/dL / Pro: 6.4 g/dL / ALK PHOS: 103 U/L / ALT: 18 U/L / AST: 21 U/L / GGT: x                                                                                                MEDICATIONS  (STANDING):  albuterol/ipratropium for Nebulization 3 milliLiter(s) Nebulizer every 6 hours  azithromycin  IVPB 500 milliGRAM(s) IV Intermittent every 24 hours  cefepime   IVPB      cefepime   IVPB 2000 milliGRAM(s) IV Intermittent every 8 hours  fluticasone propionate/ salmeterol 250-50 MICROgram(s) Diskus 1 Dose(s) Inhalation two times a day  folic acid 1 milliGRAM(s) Oral daily  heparin   Injectable 5000 Unit(s) SubCutaneous every 12 hours  influenza  Vaccine (HIGH DOSE) 0.5 milliLiter(s) IntraMuscular once  pantoprazole    Tablet 40 milliGRAM(s) Oral before breakfast    MEDICATIONS  (PRN):  acetaminophen     Tablet .. 650 milliGRAM(s) Oral every 6 hours PRN Temp greater or equal to 38C (100.4F), Mild Pain (1 - 3)  albuterol    90 MICROgram(s) HFA Inhaler 2 Puff(s) Inhalation every 4 hours PRN Shortness of Breath and/or Wheezing  ondansetron Injectable 4 milliGRAM(s) IV Push every 6 hours PRN Nausea  senna 2 Tablet(s) Oral at bedtime PRN Constipation      X-Rays reviewed:    CXR interpreted by me:  CT scan performed 12/28 images and radiologist read reviewed, compared to prior CT from November.  By my read demonstrating tree-in-bud opacities throughout the lateral left lung fields.  Stable left lower lobe atelectasis versus scarring and trace pleural effusion.  Small area of consolidation in the right basilar lung.  No PE.

## 2024-12-29 NOTE — CONSULT NOTE ADULT - SUBJECTIVE AND OBJECTIVE BOX
INFECTIOUS DISEASE CONSULT NOTE    Patient is a 79y old  Male who presents with a chief complaint of Cough (29 Dec 2024 08:05)    HPI:  79-year-old male past medical history of COPD with home O2 as needed, active lung CA on immunotherapy, recently finished chemo, presents with worsening shortness of breath and cough X 3 days.  Patient reports chills, did not take his temperature.  Patient and grandson report patient becomes profoundly more short of breath when walking.  Denies chest pain, back pain, abdominal pain, vomiting.  Denies leg pain/swelling, history of blood clots. (29 Dec 2024 01:00)         Prior hospital charts reviewed [Yes]  Primary team notes reviewed [Yes]  Other consultant notes reviewed [Yes]    REVIEW OF SYSTEMS:      PAST MEDICAL & SURGICAL HISTORY:  COPD, mild      Smoker      Melanoma of skin      Basal cell carcinoma      Lung cancer      Pneumothorax, post biopsy, left      H/O carpal tunnel repair      H/O basal cell carcinoma excision      H/O melanoma excision      After cataract, bilateral      S/P chest tube placement          SOCIAL HISTORY:  - Born in _____, [place] to  in 19[place]  - Currently working as / Retired  - Lives with _____; no pets  - No recent travel  - Denies tobacco use  - Denies alcohol use  - Denies illicit drug use  - Currently sexually active, has one male/female sexual partner    FAMILY HISTORY:      Allergies:  No Known Allergies      ANTIMICROBIALS:  azithromycin  IVPB 500 every 24 hours  cefepime   IVPB    cefepime   IVPB 2000 every 8 hours      ANTIMICROBIALS (past 90 days):  MEDICATIONS  (STANDING):  azithromycin  IVPB   255 mL/Hr IV Intermittent (12-28-24 @ 23:42)    azithromycin  IVPB   255 mL/Hr IV Intermittent (12-29-24 @ 02:29)    cefepime   IVPB   100 mL/Hr IV Intermittent (12-29-24 @ 02:29)    cefepime   IVPB   100 mL/Hr IV Intermittent (12-29-24 @ 05:45)    cefTRIAXone   IVPB   100 mL/Hr IV Intermittent (12-29-24 @ 01:17)        OTHER MEDS:   MEDICATIONS  (STANDING):  acetaminophen     Tablet .. 650 every 6 hours PRN  albuterol    90 MICROgram(s) HFA Inhaler 2 every 4 hours PRN  albuterol/ipratropium for Nebulization 3 every 6 hours  fluticasone propionate/ salmeterol 250-50 MICROgram(s) Diskus 1 two times a day  heparin   Injectable 5000 every 12 hours  influenza  Vaccine (HIGH DOSE) 0.5 once  ondansetron Injectable 4 every 6 hours PRN  pantoprazole    Tablet 40 before breakfast  senna 2 at bedtime PRN      VITALS:  Vital Signs Last 24 Hrs  T(F): 97.6 (12-29-24 @ 04:20), Max: 99 (12-28-24 @ 16:33)    Vital Signs Last 24 Hrs  HR: 94 (12-29-24 @ 04:20) (92 - 109)  BP: 116/69 (12-29-24 @ 04:20) (116/69 - 176/81)  RR: 18 (12-29-24 @ 04:20)  SpO2: 98% (12-29-24 @ 04:20) (87% - 98%)  Wt(kg): --    EXAM:    Labs:                        12.5   11.25 )-----------( 270      ( 28 Dec 2024 19:50 )             39.2     12-28    138  |  102  |  19  ----------------------------<  110[H]  4.7   |  27  |  0.9    Ca    8.5      28 Dec 2024 19:50    TPro  6.4  /  Alb  3.3[L]  /  TBili  0.3  /  DBili  x   /  AST  21  /  ALT  18  /  AlkPhos  103  12-28      WBC Trend:  WBC Count: 11.25 (12-28-24 @ 19:50)      Auto Neutrophil #: 6.17 K/uL (12-28-24 @ 19:50)      Creatine Trend:  Creatinine: 0.9 (12-28)      Liver Biochemical Testing Trend:  Alanine Aminotransferase (ALT/SGPT): 18 (12-28)  Aspartate Aminotransferase (AST/SGOT): 21 (12-28-24 @ 19:50)  Bilirubin Total: 0.3 (12-28)      Trend LDH      Auto Eosinophil %: 0.7 % (12-28-24 @ 19:50)      Urinalysis Basic - ( 28 Dec 2024 19:50 )    Color: x / Appearance: x / SG: x / pH: x  Gluc: 110 mg/dL / Ketone: x  / Bili: x / Urobili: x   Blood: x / Protein: x / Nitrite: x   Leuk Esterase: x / RBC: x / WBC x   Sq Epi: x / Non Sq Epi: x / Bacteria: x          MICROBIOLOGY:      Blood Gas Venous - Lactate: 1.4 (12-28 @ 18:40)          RADIOLOGY:  imaging below personally reviewed    < from: CT Angio Chest PE Protocol w/ IV Cont (12.28.24 @ 22:19) >  IMPRESSION:  Bilateral scattered patchy and nodular airspace opacities with more   confluent left lower lobe consolidative opacification, consistent with   pneumonia in the appropriate clinical setting.  Small left pleural effusion.  No evidence of acute pulmonary embolism.  Evaluation for pulmonary nodules, treated lung neoplasm limited by   patient condition.    < end of copied text >    < from: Xray Chest 1 View-PORTABLE IMMEDIATE (12.28.24 @ 19:00) >    IMPRESSION:    Basilar opacifications. Support devices as described. Stable.    < end of copied text >   INFECTIOUS DISEASE CONSULT NOTE    Patient is a 79y old  Male who presents with a chief complaint of Cough (29 Dec 2024 08:05)    HPI:  79-year-old male past medical history of COPD with home O2 as needed, active lung CA on immunotherapy, recently finished chemo, presents with worsening shortness of breath and cough X 3 days.  Patient reports chills, did not take his temperature.  Patient and grandson report patient becomes profoundly more short of breath when walking.  Denies chest pain, back pain, abdominal pain, vomiting.  Denies leg pain/swelling, history of blood clots. (29 Dec 2024 01:00)       Prior hospital charts reviewed [Yes]  Primary team notes reviewed [Yes]  Other consultant notes reviewed [Yes]    REVIEW OF SYSTEMS:  CONSTITUTIONAL: No fever or chills  HEAD: No lesion on scalp  EYES: No visual disturbance  ENT: No sore throat  RESPIRATORY: + cough, + shortness of breath  CARDIOVASCULAR: No chest pain or palpitations  GASTROINTESTINAL: No abdominal or epigastric pain  GENITOURINARY: No dysuria  NEUROLOGICAL: No headache/dizziness  MUSCULOSKELETAL: No joint pain, erythema, or swelling; no back pain  SKIN: No itching, rashes  All other ROS negative except noted above      PAST MEDICAL & SURGICAL HISTORY:  COPD, mild      Smoker      Melanoma of skin      Basal cell carcinoma      Lung cancer      Pneumothorax, post biopsy, left      H/O carpal tunnel repair      H/O basal cell carcinoma excision      H/O melanoma excision      After cataract, bilateral      S/P chest tube placement          SOCIAL HISTORY:  - No recent travel  - Denies tobacco use  - Denies alcohol use  - Denies illicit drug use    FAMILY HISTORY:  No family history of lung cancer    Allergies:  No Known Allergies      ANTIMICROBIALS:  azithromycin  IVPB 500 every 24 hours  cefepime   IVPB    cefepime   IVPB 2000 every 8 hours      ANTIMICROBIALS (past 90 days):  MEDICATIONS  (STANDING):  azithromycin  IVPB   255 mL/Hr IV Intermittent (12-28-24 @ 23:42)    azithromycin  IVPB   255 mL/Hr IV Intermittent (12-29-24 @ 02:29)    cefepime   IVPB   100 mL/Hr IV Intermittent (12-29-24 @ 02:29)    cefepime   IVPB   100 mL/Hr IV Intermittent (12-29-24 @ 05:45)    cefTRIAXone   IVPB   100 mL/Hr IV Intermittent (12-29-24 @ 01:17)        OTHER MEDS:   MEDICATIONS  (STANDING):  acetaminophen     Tablet .. 650 every 6 hours PRN  albuterol    90 MICROgram(s) HFA Inhaler 2 every 4 hours PRN  albuterol/ipratropium for Nebulization 3 every 6 hours  fluticasone propionate/ salmeterol 250-50 MICROgram(s) Diskus 1 two times a day  heparin   Injectable 5000 every 12 hours  influenza  Vaccine (HIGH DOSE) 0.5 once  ondansetron Injectable 4 every 6 hours PRN  pantoprazole    Tablet 40 before breakfast  senna 2 at bedtime PRN      VITALS:  Vital Signs Last 24 Hrs  T(F): 97.6 (12-29-24 @ 04:20), Max: 99 (12-28-24 @ 16:33)    Vital Signs Last 24 Hrs  HR: 94 (12-29-24 @ 04:20) (92 - 109)  BP: 116/69 (12-29-24 @ 04:20) (116/69 - 176/81)  RR: 18 (12-29-24 @ 04:20)  SpO2: 98% (12-29-24 @ 04:20) (87% - 98%)  Wt(kg): --    EXAM:  GENERAL: NAD, lying in bed  HEAD: No head lesions  EYES: Conjunctiva pink and cornea white  EAR, NOSE, MOUTH, THROAT: Normal external ears and nose, no discharges; moist mucous membranes  NECK: Supple, nontender to palpation; no JVD  RESPIRATORY: Bilateral rhronchi  CARDIOVASCULAR: S1 S2  GASTROINTESTINAL: Soft, nontender, nondistended; normoactive bowel sounds  GENITOURINARY: No henry catheter, no CVA tenderness  EXTREMITIES: No clubbing, cyanosis, or petal edema  NERVOUS SYSTEM: Alert and oriented to person, time, place and situation, speech clear. No focal deficits   MUSCULOSKELETAL: No joint erythema, swelling or pain  SKIN: No rashes or lesions, no superficial thrombophlebitis  PSYCH: Normal affect      Labs:                        12.5   11.25 )-----------( 270      ( 28 Dec 2024 19:50 )             39.2     12-28    138  |  102  |  19  ----------------------------<  110[H]  4.7   |  27  |  0.9    Ca    8.5      28 Dec 2024 19:50    TPro  6.4  /  Alb  3.3[L]  /  TBili  0.3  /  DBili  x   /  AST  21  /  ALT  18  /  AlkPhos  103  12-28      WBC Trend:  WBC Count: 11.25 (12-28-24 @ 19:50)      Auto Neutrophil #: 6.17 K/uL (12-28-24 @ 19:50)      Creatine Trend:  Creatinine: 0.9 (12-28)      Liver Biochemical Testing Trend:  Alanine Aminotransferase (ALT/SGPT): 18 (12-28)  Aspartate Aminotransferase (AST/SGOT): 21 (12-28-24 @ 19:50)  Bilirubin Total: 0.3 (12-28)      Trend LDH      Auto Eosinophil %: 0.7 % (12-28-24 @ 19:50)      Urinalysis Basic - ( 28 Dec 2024 19:50 )    Color: x / Appearance: x / SG: x / pH: x  Gluc: 110 mg/dL / Ketone: x  / Bili: x / Urobili: x   Blood: x / Protein: x / Nitrite: x   Leuk Esterase: x / RBC: x / WBC x   Sq Epi: x / Non Sq Epi: x / Bacteria: x          MICROBIOLOGY:      Blood Gas Venous - Lactate: 1.4 (12-28 @ 18:40)          RADIOLOGY:  imaging below personally reviewed    < from: CT Angio Chest PE Protocol w/ IV Cont (12.28.24 @ 22:19) >  IMPRESSION:  Bilateral scattered patchy and nodular airspace opacities with more   confluent left lower lobe consolidative opacification, consistent with   pneumonia in the appropriate clinical setting.  Small left pleural effusion.  No evidence of acute pulmonary embolism.  Evaluation for pulmonary nodules, treated lung neoplasm limited by   patient condition.    < end of copied text >    < from: Xray Chest 1 View-PORTABLE IMMEDIATE (12.28.24 @ 19:00) >    IMPRESSION:    Basilar opacifications. Support devices as described. Stable.    < end of copied text >

## 2024-12-29 NOTE — ED PROVIDER NOTE - CLINICAL SUMMARY MEDICAL DECISION MAKING FREE TEXT BOX
Labs and imaging obtained.  Patient found to have pneumonia.  Results reviewed and discussed with patient and family.  Will admit at this time.  Patient has oxygen requirement.

## 2024-12-29 NOTE — ED PROVIDER NOTE - PHYSICAL EXAMINATION
Vital Signs: I have reviewed the initial vital signs.  CONSTITUTIONAL: Pt with increase WOB and hypoxia on RA, improved on NC  SKIN: Skin exam is warm and dry, no acute rash.  HEAD: Normocephalic; atraumatic.  EYES: PERRL, EOM intact; conjunctiva and sclera clear.  ENT: No nasal discharge; airway clear.   NECK: Supple;   CARD: S1, S2 normal; no murmurs, gallops, or rubs. Regular rate and rhythm.  RESP: +coarse breath sounds b/l with wheezing  ABD: soft; non-distended; non-tender; no hepatosplenomegaly.  MSK: Normal ROM. No clubbing, cyanosis or edema.

## 2024-12-29 NOTE — CONSULT NOTE ADULT - ASSESSMENT
79-year-old male past medical history of COPD with home O2 as needed, active lung CA on immunotherapy, recently finished chemo, presents with worsening shortness of breath and cough X 3 days.      ID is consulted for PNA  Afebrile  WBC 11.25  On r3L NC  COVID/flu/RSV negative  BCx pending  Urine Legionella Ag pending  Urine Strep Ag pending    CTA chest 12/28  Bilateral scattered patchy and nodular airspace opacities with more   confluent left lower lobe consolidative opacification, consistent with   pneumonia in the appropriate clinical setting.  Small left pleural effusion.  No evidence of acute pulmonary embolism.  Evaluation for pulmonary nodules, treated lung neoplasm limited by   patient condition.    Antibiotics:  Azithromycin 12/28 ->  ceftriaxone 12/29  Cefepime 12/29 ->      IMPRESSION:      RECOMMENDATIONS:        * THIS IS AN INCOMPLETE NOTE. FINAL RECOMMENDATION IS PENDING *     79-year-old male past medical history of COPD with home O2 as needed, active lung CA on immunotherapy, recently finished chemo, presents with worsening shortness of breath and cough X 3 days.      ID is consulted for PNA  Afebrile  WBC 11.25  On r3L NC  COVID/flu/RSV negative  BCx pending  Urine Legionella Ag pending  Urine Strep Ag pending    CTA chest 12/28  Bilateral scattered patchy and nodular airspace opacities with more   confluent left lower lobe consolidative opacification, consistent with   pneumonia in the appropriate clinical setting.  Small left pleural effusion.  No evidence of acute pulmonary embolism.  Evaluation for pulmonary nodules, treated lung neoplasm limited by   patient condition.    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  Attending Physician  Division of Infectious Diseases  [facility]  Please contact me via [employer] Teams

## 2024-12-29 NOTE — ED PROVIDER NOTE - SECONDARY DIAGNOSIS.
02-Mar-2017 23:15 02-Mar-2017 23:45 03-Mar-2017 08:30 05-Mar-2017 07:40 02-Mar-2017 20:01 Hypoxia COPD exacerbation Pneumonia

## 2024-12-29 NOTE — H&P ADULT - ASSESSMENT
79-year-old male past medical history of COPD with home O2 as needed, active lung CA on immunotherapy, recently finished chemo, presents with    #Acute on chronic respiratory failure  #PNA  -Chest CTA shows Bilateral scattered patchy and nodular airspace opacities with more confluent left lower lobe consolidative opacification, consistent with   pneumonia in the appropriate clinical setting.  Small left pleural effusion.  No evidence of acute pulmonary embolism.  Evaluation for pulmonary nodules, treated lung neoplasm limited by   patient condition.  -obtain sputum culture, legionella, strep, MRSA swab  -Oxygen  -IV antibiotic  -duoneb neb tx  -ID and pulmonary consult     #COPD  -stable     #Lung cancer  -Outpatient follow up with oncology      #Progress Note Handoff  Pending (specify):  as above   Family discussion:  plan of care was discussed with patient and family in details.  all questions were answered.  seems to understand, and in agreement  Disposition:  home

## 2024-12-29 NOTE — H&P ADULT - NSHPPHYSICALEXAM_GEN_ALL_CORE
Vital Signs Last 24 Hrs  T(C): 36.9 (28 Dec 2024 22:39), Max: 37.2 (28 Dec 2024 16:33)  T(F): 98.5 (28 Dec 2024 22:39), Max: 99 (28 Dec 2024 16:33)  HR: 99 (28 Dec 2024 22:39) (92 - 109)  BP: 128/75 (28 Dec 2024 22:39) (117/67 - 128/75)  BP(mean): --  RR: 24 (28 Dec 2024 22:39) (24 - 30)  SpO2: 95% (28 Dec 2024 22:39) (87% - 96%)    Parameters below as of 28 Dec 2024 22:39  Patient On (Oxygen Delivery Method): nasal cannula  O2 Flow (L/min): 3    PHYSICAL EXAM-  GENERAL: NAD,   HEAD:  Atraumatic, Normocephalic  EYES: EOMI, PERRLA, conjunctiva and sclera clear  NECK: Supple, No JVD, Normal thyroid  NERVOUS SYSTEM:  Alert & Oriented X3, Motor Strength 5/5 B/L upper and lower extremities; DTRs 2+ intact and symmetric  CHEST/LUNG:+ rhonchi with decrease air entry  HEART: Regular rate and rhythm; No murmurs, rubs, or gallops  ABDOMEN: Soft, Nontender, Nondistended; Bowel sounds present  EXTREMITIES:  2+ Peripheral Pulses, No clubbing, cyanosis, or edema  SKIN: No rashes or lesions

## 2024-12-29 NOTE — PATIENT PROFILE ADULT - FALL HARM RISK - HARM RISK INTERVENTIONS

## 2024-12-29 NOTE — ED PROVIDER NOTE - ATTENDING APP SHARED VISIT CONTRIBUTION OF CARE
79-year-old male past medical history noted including history of lung cancer, status postchemotherapy, currently on immunotherapy history of COPD presents with family member for evaluation of increasing shortness of breath and cough for the last 3 days.  Patient with dyspnea on exertion.  Does have oxygen at home which she does not need to use on a daily basis.  Family put it on him and found little improvement so brought him here.  On exam patient in NAD, AAOx3, lungs with crackles bilateral, abdomen soft, no edema, no calf tenderness

## 2024-12-29 NOTE — ED PROVIDER NOTE - CARE PLAN
1 Principal Discharge DX:	Sepsis  Secondary Diagnosis:	Pneumonia  Secondary Diagnosis:	Hypoxia  Secondary Diagnosis:	COPD exacerbation

## 2024-12-29 NOTE — PATIENT PROFILE ADULT - NS PRO AD NO ADVANCE DIRECTIVE
Patient states he has DNR/DNI in place, but copy is not available at this time, awaiting attending to complete H&P and sign advanced directive./No

## 2024-12-29 NOTE — PROGRESS NOTE ADULT - SUBJECTIVE AND OBJECTIVE BOX
JAMEL WATERS 79y Male  MRN#: [id number]     SUBJECTIVE  Patient is a 79y old Male who presents with a chief complaint of Cough (29 Dec 2024 08:55)    Interval/Overnight Events:    Today is hospital day , and this morning he is lying in bed without distress.   No acute overnight events.     OBJECTIVE  PAST MEDICAL & SURGICAL HISTORY  COPD, mild    Smoker    Melanoma of skin    Basal cell carcinoma    Lung cancer    Pneumothorax, post biopsy, left    H/O carpal tunnel repair    H/O basal cell carcinoma excision    H/O melanoma excision    After cataract, bilateral    S/P chest tube placement      ALLERGIES:  No Known Allergies    MEDICATIONS:  STANDING MEDICATIONS  albuterol/ipratropium for Nebulization 3 milliLiter(s) Nebulizer every 6 hours  azithromycin  IVPB 500 milliGRAM(s) IV Intermittent every 24 hours  cefepime   IVPB      cefepime   IVPB 2000 milliGRAM(s) IV Intermittent every 8 hours  fluticasone propionate/ salmeterol 250-50 MICROgram(s) Diskus 1 Dose(s) Inhalation two times a day  folic acid 1 milliGRAM(s) Oral daily  heparin   Injectable 5000 Unit(s) SubCutaneous every 12 hours  influenza  Vaccine (HIGH DOSE) 0.5 milliLiter(s) IntraMuscular once  pantoprazole    Tablet 40 milliGRAM(s) Oral before breakfast  sodium zirconium cyclosilicate 10 Gram(s) Oral once    PRN MEDICATIONS  acetaminophen     Tablet .. 650 milliGRAM(s) Oral every 6 hours PRN  albuterol    90 MICROgram(s) HFA Inhaler 2 Puff(s) Inhalation every 4 hours PRN  ondansetron Injectable 4 milliGRAM(s) IV Push every 6 hours PRN  senna 2 Tablet(s) Oral at bedtime PRN    HOME MEDICATIONS  Home Medications:  Albuterol (Eqv-ProAir HFA) 90 mcg/inh inhalation aerosol: 2 puff(s) inhaled 4 times a day as needed for  bronchospasm (18 Nov 2023 04:52)  budesonide-formoterol 160 mcg-4.5 mcg/inh inhalation aerosol: 2 puff(s) inhaled 2 times a day (18 Nov 2023 04:52)  Centrum Adults oral tablet: 1 tab(s) orally once a day (18 Nov 2023 04:52)  Colace 50 mg oral capsule: 1 cap(s) orally 2 times a day (18 Nov 2023 04:52)  folic acid 1 mg oral tablet: 1 tab(s) orally once a day (18 Nov 2023 04:52)  Vitamin D2 50 mcg (2000 intl units) oral capsule: 1 cap(s) orally once a day (18 Nov 2023 04:52)      LABS:                        12.1   6.26  )-----------( 260      ( 29 Dec 2024 08:43 )             38.8     12-29    140  |  104  |  17  ----------------------------<  144[H]  5.6[H]   |  28  |  0.8    Ca    8.8      29 Dec 2024 08:43    TPro  6.2  /  Alb  3.2[L]  /  TBili  <0.2  /  DBili  x   /  AST  21  /  ALT  17  /  AlkPhos  100  12-29    LIVER FUNCTIONS - ( 29 Dec 2024 08:43 )  Alb: 3.2 g/dL / Pro: 6.2 g/dL / ALK PHOS: 100 U/L / ALT: 17 U/L / AST: 21 U/L / GGT: x             Urinalysis Basic - ( 29 Dec 2024 08:43 )    Color: x / Appearance: x / SG: x / pH: x  Gluc: 144 mg/dL / Ketone: x  / Bili: x / Urobili: x   Blood: x / Protein: x / Nitrite: x   Leuk Esterase: x / RBC: x / WBC x   Sq Epi: x / Non Sq Epi: x / Bacteria: x                CAPILLARY BLOOD GLUCOSE          PHYSICAL EXAM:  T(C): 36.7 (12-29-24 @ 13:08), Max: 37.2 (12-28-24 @ 16:33)  HR: 93 (12-29-24 @ 13:08) (92 - 109)  BP: 110/66 (12-29-24 @ 13:08) (110/66 - 176/81)  RR: 18 (12-29-24 @ 13:08) (18 - 30)  SpO2: 93% (12-29-24 @ 13:08) (87% - 98%)    HEAD:  Atraumatic, Normocephalic  EYES: EOMI, PERRLA, conjunctiva and sclera clear  NECK: Supple, No JVD, Normal thyroid  NERVOUS SYSTEM:  A&O x3, Motor Strength 5/5 B/L upper and lower extremities  CHEST/LUNG:+ rhonchi with decrease air entry  HEART: Regular rate and rhythm; No murmurs, rubs, or gallops  ABDOMEN: Soft, Nontender, Nondistended; Bowel sounds present  EXTREMITIES:  2+ Peripheral Pulses, No clubbing, cyanosis, or edema  SKIN: No rashes or lesions    ADMISSION SUMMARY  Patient is a 79y old Male who presents with a chief complaint of Cough (29 Dec 2024 08:55)

## 2024-12-29 NOTE — ED PROVIDER NOTE - OBJECTIVE STATEMENT
79-year-old male past medical history of COPD with home O2 as needed, active lung CA on immunotherapy, recently finished chemo, presents with worsening shortness of breath and cough X 3 days.  Patient reports chills, did not take his temperature.  Patient and grandson report patient becomes profoundly more short of breath when walking.  Denies chest pain, back pain, abdominal pain, vomiting.  Denies leg pain/swelling, history of blood clots.

## 2024-12-30 ENCOUNTER — RESULT REVIEW (OUTPATIENT)
Age: 79
End: 2024-12-30

## 2024-12-30 LAB
ANION GAP SERPL CALC-SCNC: 9 MMOL/L — SIGNIFICANT CHANGE UP (ref 7–14)
BUN SERPL-MCNC: 26 MG/DL — HIGH (ref 10–20)
CALCIUM SERPL-MCNC: 8.6 MG/DL — SIGNIFICANT CHANGE UP (ref 8.4–10.5)
CHLORIDE SERPL-SCNC: 103 MMOL/L — SIGNIFICANT CHANGE UP (ref 98–110)
CO2 SERPL-SCNC: 28 MMOL/L — SIGNIFICANT CHANGE UP (ref 17–32)
CREAT SERPL-MCNC: 1.1 MG/DL — SIGNIFICANT CHANGE UP (ref 0.7–1.5)
EGFR: 67 ML/MIN/1.73M2 — SIGNIFICANT CHANGE UP
GLUCOSE SERPL-MCNC: 84 MG/DL — SIGNIFICANT CHANGE UP (ref 70–99)
HCT VFR BLD CALC: 35.1 % — LOW (ref 42–52)
HGB BLD-MCNC: 11 G/DL — LOW (ref 14–18)
LEGIONELLA AG UR QL: NEGATIVE — SIGNIFICANT CHANGE UP
MCHC RBC-ENTMCNC: 29.8 PG — SIGNIFICANT CHANGE UP (ref 27–31)
MCHC RBC-ENTMCNC: 31.3 G/DL — LOW (ref 32–37)
MCV RBC AUTO: 95.1 FL — HIGH (ref 80–94)
NRBC # BLD: 0 /100 WBCS — SIGNIFICANT CHANGE UP (ref 0–0)
PLATELET # BLD AUTO: 302 K/UL — SIGNIFICANT CHANGE UP (ref 130–400)
PMV BLD: 9 FL — SIGNIFICANT CHANGE UP (ref 7.4–10.4)
POTASSIUM SERPL-MCNC: 4.6 MMOL/L — SIGNIFICANT CHANGE UP (ref 3.5–5)
POTASSIUM SERPL-SCNC: 4.6 MMOL/L — SIGNIFICANT CHANGE UP (ref 3.5–5)
RAPID RVP RESULT: SIGNIFICANT CHANGE UP
RBC # BLD: 3.69 M/UL — LOW (ref 4.7–6.1)
RBC # FLD: 14.9 % — HIGH (ref 11.5–14.5)
S PNEUM AG UR QL: NEGATIVE — SIGNIFICANT CHANGE UP
SARS-COV-2 RNA SPEC QL NAA+PROBE: SIGNIFICANT CHANGE UP
SODIUM SERPL-SCNC: 140 MMOL/L — SIGNIFICANT CHANGE UP (ref 135–146)
WBC # BLD: 8.68 K/UL — SIGNIFICANT CHANGE UP (ref 4.8–10.8)
WBC # FLD AUTO: 8.68 K/UL — SIGNIFICANT CHANGE UP (ref 4.8–10.8)

## 2024-12-30 PROCEDURE — 99232 SBSQ HOSP IP/OBS MODERATE 35: CPT

## 2024-12-30 PROCEDURE — 93307 TTE W/O DOPPLER COMPLETE: CPT | Mod: 26

## 2024-12-30 RX ORDER — GUAIFENESIN/DEXTROMETHORPHAN 200-10MG/5
10 LIQUID (ML) ORAL EVERY 6 HOURS
Refills: 0 | Status: DISCONTINUED | OUTPATIENT
Start: 2024-12-30 | End: 2025-01-01

## 2024-12-30 RX ADMIN — Medication 1 MILLIGRAM(S): at 12:27

## 2024-12-30 RX ADMIN — IPRATROPIUM BROMIDE AND ALBUTEROL SULFATE 3 MILLILITER(S): .5; 2.5 SOLUTION RESPIRATORY (INHALATION) at 14:16

## 2024-12-30 RX ADMIN — Medication 100 MILLIGRAM(S): at 21:12

## 2024-12-30 RX ADMIN — IPRATROPIUM BROMIDE AND ALBUTEROL SULFATE 3 MILLILITER(S): .5; 2.5 SOLUTION RESPIRATORY (INHALATION) at 07:53

## 2024-12-30 RX ADMIN — HEPARIN SODIUM 5000 UNIT(S): 1000 INJECTION, SOLUTION INTRAVENOUS; SUBCUTANEOUS at 16:49

## 2024-12-30 RX ADMIN — IPRATROPIUM BROMIDE AND ALBUTEROL SULFATE 3 MILLILITER(S): .5; 2.5 SOLUTION RESPIRATORY (INHALATION) at 19:42

## 2024-12-30 RX ADMIN — Medication 100 MILLIGRAM(S): at 05:39

## 2024-12-30 RX ADMIN — PANTOPRAZOLE 40 MILLIGRAM(S): 40 TABLET, DELAYED RELEASE ORAL at 05:45

## 2024-12-30 RX ADMIN — Medication 10 MILLILITER(S): at 13:15

## 2024-12-30 RX ADMIN — MONTELUKAST SODIUM 10 MILLIGRAM(S): 10 TABLET, FILM COATED ORAL at 12:29

## 2024-12-30 RX ADMIN — HEPARIN SODIUM 5000 UNIT(S): 1000 INJECTION, SOLUTION INTRAVENOUS; SUBCUTANEOUS at 05:40

## 2024-12-30 RX ADMIN — Medication 100 MILLIGRAM(S): at 12:27

## 2024-12-30 RX ADMIN — BUDESONIDE AND FORMOTEROL FUMARATE 2 PUFF(S): 160; 4.5 AEROSOL, METERED RESPIRATORY (INHALATION) at 12:27

## 2024-12-30 RX ADMIN — Medication 10 MILLILITER(S): at 21:17

## 2024-12-30 RX ADMIN — AZITHROMYCIN MONOHYDRATE 255 MILLIGRAM(S): 200 POWDER, FOR SUSPENSION ORAL at 02:01

## 2024-12-30 NOTE — PROGRESS NOTE ADULT - ASSESSMENT
79-year-old male past medical history of COPD with home O2 as needed, active lung CA on immunotherapy, recently finished chemo, presents with worsening shortness of breath and cough x 3 days, with associated chills. Endorses shortness of breath with exertion. Admitted for acute on chronic hypoxic respiratory failure 2/2 PNA.    #Acute on chronic hypoxic respiratory failure  #Sepsis on presentation 2/2 PNA  #hx of COPD on home O2 as needed  #Lung Ca on immunotherapy  - CTA chest: No PE. Bilateral scattered patchy and nodular airspace opacities with more confluent left lower lobe consolidative opacification. Small left pleural effusion. No evidence of acute pulmonary embolism.   - no evidence of drug pneumonitis on CT, patient denies using checkpoint inhibitor therapy  - on 3L NC  - Flu/RSV/COVID negative, expanded RVP also expectantly negative  - strep/legionella negative, MRSA negative  - c/w cefepime, azithromycin  - c/w duonebs, advair (or symbicort if patient uses his own), singulair  - f/u blood cultures, sputum cultures  - BNP elevated, check TTE  - wean O2 as tolerated    Pt covered by [facility] insurance for melanoma, basal cell carcinoma, COPD, and Lung cancer.    Misc:  DVT ppx: heparin subQ  GI ppx: Protonix  Diet: DASH/TLC  Activity: IAT  Code: Full Code  Dispo: Acute     Pending/Handoff: f/u infectious workup, TTE, pending improvement in O2

## 2024-12-30 NOTE — PROGRESS NOTE ADULT - SUBJECTIVE AND OBJECTIVE BOX
JAMEL WATERS 79y Male  MRN#: [id number]     SUBJECTIVE  Patient is a 79y old Male who presents with a chief complaint of Cough (29 Dec 2024 14:06)    Interval/Overnight Events:    Today is hospital day 1d, and this morning he is lying in bed without distress.   No acute overnight events.     OBJECTIVE  PAST MEDICAL & SURGICAL HISTORY  COPD, mild    Smoker    Melanoma of skin    Basal cell carcinoma    Lung cancer    Pneumothorax, post biopsy, left    H/O carpal tunnel repair    H/O basal cell carcinoma excision    H/O melanoma excision    After cataract, bilateral    S/P chest tube placement      ALLERGIES:  No Known Allergies    MEDICATIONS:  STANDING MEDICATIONS  albuterol/ipratropium for Nebulization 3 milliLiter(s) Nebulizer every 6 hours  azithromycin  IVPB 500 milliGRAM(s) IV Intermittent every 24 hours  budesonide 160 MICROgram(s)/formoterol 4.5 MICROgram(s) Inhaler 2 Puff(s) Inhalation two times a day  cefepime   IVPB      cefepime   IVPB 2000 milliGRAM(s) IV Intermittent every 8 hours  folic acid 1 milliGRAM(s) Oral daily  heparin   Injectable 5000 Unit(s) SubCutaneous every 12 hours  influenza  Vaccine (HIGH DOSE) 0.5 milliLiter(s) IntraMuscular once  montelukast 10 milliGRAM(s) Oral daily  pantoprazole    Tablet 40 milliGRAM(s) Oral before breakfast    PRN MEDICATIONS  acetaminophen     Tablet .. 650 milliGRAM(s) Oral every 6 hours PRN  albuterol    90 MICROgram(s) HFA Inhaler 2 Puff(s) Inhalation every 4 hours PRN  guaifenesin/dextromethorphan Oral Liquid 10 milliLiter(s) Oral every 6 hours PRN  ondansetron Injectable 4 milliGRAM(s) IV Push every 6 hours PRN  senna 2 Tablet(s) Oral at bedtime PRN    HOME MEDICATIONS  Home Medications:  Albuterol (Eqv-ProAir HFA) 90 mcg/inh inhalation aerosol: 2 puff(s) inhaled 4 times a day as needed for  bronchospasm (18 Nov 2023 04:52)  budesonide-formoterol 160 mcg-4.5 mcg/inh inhalation aerosol: 2 puff(s) inhaled 2 times a day (18 Nov 2023 04:52)  Centrum Adults oral tablet: 1 tab(s) orally once a day (18 Nov 2023 04:52)  Colace 50 mg oral capsule: 1 cap(s) orally 2 times a day (18 Nov 2023 04:52)  folic acid 1 mg oral tablet: 1 tab(s) orally once a day (18 Nov 2023 04:52)  Vitamin D2 50 mcg (2000 intl units) oral capsule: 1 cap(s) orally once a day (18 Nov 2023 04:52)      LABS:                        11.0   8.68  )-----------( 302      ( 30 Dec 2024 06:14 )             35.1     12-30    140  |  103  |  26[H]  ----------------------------<  84  4.6   |  28  |  1.1    Ca    8.6      30 Dec 2024 06:14    TPro  6.2  /  Alb  3.2[L]  /  TBili  <0.2  /  DBili  x   /  AST  21  /  ALT  17  /  AlkPhos  100  12-29    LIVER FUNCTIONS - ( 29 Dec 2024 08:43 )  Alb: 3.2 g/dL / Pro: 6.2 g/dL / ALK PHOS: 100 U/L / ALT: 17 U/L / AST: 21 U/L / GGT: x             Urinalysis Basic - ( 30 Dec 2024 06:14 )    Color: x / Appearance: x / SG: x / pH: x  Gluc: 84 mg/dL / Ketone: x  / Bili: x / Urobili: x   Blood: x / Protein: x / Nitrite: x   Leuk Esterase: x / RBC: x / WBC x   Sq Epi: x / Non Sq Epi: x / Bacteria: x            Culture - Sputum (collected 29 Dec 2024 10:34)  Source: .Sputum Sputum  Gram Stain (29 Dec 2024 22:14):    Numerous polymorphonuclear leukocytes per low power field    No Squamous epithelial cells per low power field    Few Gram Positive Cocci in Pairs and Chains and clusters per oil power    field  Preliminary Report (30 Dec 2024 13:04):    Commensal dyana consistent with body site          CAPILLARY BLOOD GLUCOSE          PHYSICAL EXAM:  T(C): 36.8 (12-30-24 @ 13:14), Max: 36.8 (12-30-24 @ 13:14)  HR: 88 (12-30-24 @ 13:14) (75 - 90)  BP: 126/74 (12-30-24 @ 13:14) (115/69 - 126/74)  RR: 18 (12-30-24 @ 13:14) (18 - 18)  SpO2: 94% (12-30-24 @ 13:14) (94% - 96%)    HEAD:  Atraumatic, Normocephalic  EYES: EOMI, PERRLA, conjunctiva and sclera clear  NECK: Supple, No JVD, Normal thyroid  NERVOUS SYSTEM:  A&O x3, Motor Strength 5/5 B/L upper and lower extremities  CHEST/LUNG:+ rhonchi with decrease air entry  HEART: Regular rate and rhythm; No murmurs, rubs, or gallops  ABDOMEN: Soft, Nontender, Nondistended; Bowel sounds present  EXTREMITIES:  2+ Peripheral Pulses, No clubbing, cyanosis, or edema  SKIN: No rashes or lesions    ADMISSION SUMMARY  Patient is a 79y old Male who presents with a chief complaint of Cough (29 Dec 2024 14:06)

## 2024-12-31 LAB
CULTURE RESULTS: ABNORMAL
SPECIMEN SOURCE: SIGNIFICANT CHANGE UP

## 2024-12-31 PROCEDURE — 99232 SBSQ HOSP IP/OBS MODERATE 35: CPT

## 2024-12-31 RX ADMIN — IPRATROPIUM BROMIDE AND ALBUTEROL SULFATE 3 MILLILITER(S): .5; 2.5 SOLUTION RESPIRATORY (INHALATION) at 07:50

## 2024-12-31 RX ADMIN — Medication 10 MILLILITER(S): at 11:20

## 2024-12-31 RX ADMIN — Medication 1 MILLIGRAM(S): at 11:17

## 2024-12-31 RX ADMIN — HEPARIN SODIUM 5000 UNIT(S): 1000 INJECTION, SOLUTION INTRAVENOUS; SUBCUTANEOUS at 05:12

## 2024-12-31 RX ADMIN — Medication 10 MILLILITER(S): at 05:12

## 2024-12-31 RX ADMIN — BUDESONIDE AND FORMOTEROL FUMARATE 2 PUFF(S): 160; 4.5 AEROSOL, METERED RESPIRATORY (INHALATION) at 11:17

## 2024-12-31 RX ADMIN — HEPARIN SODIUM 5000 UNIT(S): 1000 INJECTION, SOLUTION INTRAVENOUS; SUBCUTANEOUS at 16:19

## 2024-12-31 RX ADMIN — IPRATROPIUM BROMIDE AND ALBUTEROL SULFATE 3 MILLILITER(S): .5; 2.5 SOLUTION RESPIRATORY (INHALATION) at 14:56

## 2024-12-31 RX ADMIN — PANTOPRAZOLE 40 MILLIGRAM(S): 40 TABLET, DELAYED RELEASE ORAL at 05:14

## 2024-12-31 RX ADMIN — MONTELUKAST SODIUM 10 MILLIGRAM(S): 10 TABLET, FILM COATED ORAL at 11:17

## 2024-12-31 RX ADMIN — Medication 100 MILLIGRAM(S): at 21:21

## 2024-12-31 RX ADMIN — Medication 100 MILLIGRAM(S): at 05:12

## 2024-12-31 RX ADMIN — AZITHROMYCIN MONOHYDRATE 255 MILLIGRAM(S): 200 POWDER, FOR SUSPENSION ORAL at 02:11

## 2024-12-31 RX ADMIN — IPRATROPIUM BROMIDE AND ALBUTEROL SULFATE 3 MILLILITER(S): .5; 2.5 SOLUTION RESPIRATORY (INHALATION) at 21:10

## 2024-12-31 RX ADMIN — Medication 100 MILLIGRAM(S): at 11:18

## 2024-12-31 NOTE — PROGRESS NOTE ADULT - SUBJECTIVE AND OBJECTIVE BOX
JAMEL WATERS 79y Male  MRN#: [id number]     SUBJECTIVE  Patient is a 79y old Male who presents with a chief complaint of Cough (30 Dec 2024 16:44)    Interval/Overnight Events:    Today is hospital day 2d, and this morning he is lying in bed without distress.   No acute overnight events.     OBJECTIVE  PAST MEDICAL & SURGICAL HISTORY  COPD, mild    Smoker    Melanoma of skin    Basal cell carcinoma    Lung cancer    Pneumothorax, post biopsy, left    H/O carpal tunnel repair    H/O basal cell carcinoma excision    H/O melanoma excision    After cataract, bilateral    S/P chest tube placement      ALLERGIES:  No Known Allergies    MEDICATIONS:  STANDING MEDICATIONS  albuterol/ipratropium for Nebulization 3 milliLiter(s) Nebulizer every 6 hours  azithromycin  IVPB 500 milliGRAM(s) IV Intermittent every 24 hours  budesonide 160 MICROgram(s)/formoterol 4.5 MICROgram(s) Inhaler 2 Puff(s) Inhalation two times a day  cefepime   IVPB      cefepime   IVPB 2000 milliGRAM(s) IV Intermittent every 8 hours  folic acid 1 milliGRAM(s) Oral daily  heparin   Injectable 5000 Unit(s) SubCutaneous every 12 hours  influenza  Vaccine (HIGH DOSE) 0.5 milliLiter(s) IntraMuscular once  montelukast 10 milliGRAM(s) Oral daily  pantoprazole    Tablet 40 milliGRAM(s) Oral before breakfast    PRN MEDICATIONS  acetaminophen     Tablet .. 650 milliGRAM(s) Oral every 6 hours PRN  albuterol    90 MICROgram(s) HFA Inhaler 2 Puff(s) Inhalation every 4 hours PRN  guaifenesin/dextromethorphan Oral Liquid 10 milliLiter(s) Oral every 6 hours PRN  ondansetron Injectable 4 milliGRAM(s) IV Push every 6 hours PRN  senna 2 Tablet(s) Oral at bedtime PRN    HOME MEDICATIONS  Home Medications:  Albuterol (Eqv-ProAir HFA) 90 mcg/inh inhalation aerosol: 2 puff(s) inhaled 4 times a day as needed for  bronchospasm (18 Nov 2023 04:52)  budesonide-formoterol 160 mcg-4.5 mcg/inh inhalation aerosol: 2 puff(s) inhaled 2 times a day (18 Nov 2023 04:52)  Centrum Adults oral tablet: 1 tab(s) orally once a day (18 Nov 2023 04:52)  Colace 50 mg oral capsule: 1 cap(s) orally 2 times a day (18 Nov 2023 04:52)  folic acid 1 mg oral tablet: 1 tab(s) orally once a day (18 Nov 2023 04:52)  Vitamin D2 50 mcg (2000 intl units) oral capsule: 1 cap(s) orally once a day (18 Nov 2023 04:52)      LABS:                        11.0   8.68  )-----------( 302      ( 30 Dec 2024 06:14 )             35.1     12-30    140  |  103  |  26[H]  ----------------------------<  84  4.6   |  28  |  1.1    Ca    8.6      30 Dec 2024 06:14          Urinalysis Basic - ( 30 Dec 2024 06:14 )    Color: x / Appearance: x / SG: x / pH: x  Gluc: 84 mg/dL / Ketone: x  / Bili: x / Urobili: x   Blood: x / Protein: x / Nitrite: x   Leuk Esterase: x / RBC: x / WBC x   Sq Epi: x / Non Sq Epi: x / Bacteria: x            Culture - Sputum (collected 29 Dec 2024 10:34)  Source: .Sputum Sputum  Gram Stain (29 Dec 2024 22:14):    Numerous polymorphonuclear leukocytes per low power field    No Squamous epithelial cells per low power field    Few Gram Positive Cocci in Pairs and Chains and clusters per oil power    field  Final Report (31 Dec 2024 11:14):    Commensal dyana consistent with body site    Culture - Blood (collected 28 Dec 2024 23:22)  Source: .Blood BLOOD  Preliminary Report (30 Dec 2024 22:02):    No growth at 24 hours    Culture - Blood (collected 28 Dec 2024 23:22)  Source: .Blood BLOOD  Preliminary Report (30 Dec 2024 22:02):    No growth at 24 hours          CAPILLARY BLOOD GLUCOSE          PHYSICAL EXAM:  T(C): 36.2 (12-31-24 @ 14:33), Max: 36.8 (12-31-24 @ 05:15)  HR: 103 (12-31-24 @ 14:33) (87 - 110)  BP: 137/78 (12-31-24 @ 14:33) (114/67 - 160/83)  RR: 16 (12-31-24 @ 14:33) (16 - 18)  SpO2: 93% (12-31-24 @ 14:33) (93% - 95%)    HEAD:  Atraumatic, Normocephalic  EYES: EOMI, PERRLA, conjunctiva and sclera clear  NECK: Supple, No JVD, Normal thyroid  NERVOUS SYSTEM:  A&O x3, Motor Strength 5/5 B/L upper and lower extremities  CHEST/LUNG:+ rhonchi with decrease air entry  HEART: Regular rate and rhythm; No murmurs, rubs, or gallops  ABDOMEN: Soft, Nontender, Nondistended; Bowel sounds present  EXTREMITIES:  2+ Peripheral Pulses, No clubbing, cyanosis, or edema  SKIN: No rashes or lesions      ADMISSION SUMMARY  Patient is a 79y old Male who presents with a chief complaint of Cough (30 Dec 2024 16:44)

## 2024-12-31 NOTE — PROGRESS NOTE ADULT - ASSESSMENT
79-year-old male past medical history of COPD with home O2 as needed, active lung CA on immunotherapy, recently finished chemo, presents with worsening shortness of breath and cough X 3 days.      ID is consulted for PNA  Afebrile  WBC 11.25  On r3L NC  COVID/flu/RSV negative  BCx pending  Urine Legionella Ag pending  Urine Strep Ag pending    CTA chest 12/28  Bilateral scattered patchy and nodular airspace opacities with more   confluent left lower lobe consolidative opacification, consistent with   pneumonia in the appropriate clinical setting.  Small left pleural effusion.  No evidence of acute pulmonary embolism.  Evaluation for pulmonary nodules, treated lung neoplasm limited by   patient condition.    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  Attending Physician  Division of Infectious Diseases  [facility]  Please contact me via [employer] Teams

## 2024-12-31 NOTE — PROGRESS NOTE ADULT - SUBJECTIVE AND OBJECTIVE BOX
INFECTIOUS DISEASE FOLLOW UP NOTE:    Interval History/ROS: Patient is a 79y old  Male who presents with a chief complaint of Cough (31 Dec 2024 17:20)    Overnight events: Breathing is improved. less cough.     REVIEW OF SYSTEMS:  CONSTITUTIONAL: No fever or chills  HEAD: No lesion on scalp  EYES: No visual disturbance  ENT: No sore throat  RESPIRATORY: + cough, + shortness of breath  CARDIOVASCULAR: No chest pain or palpitations  GASTROINTESTINAL: No abdominal or epigastric pain  GENITOURINARY: No dysuria  NEUROLOGICAL: No headache/dizziness  MUSCULOSKELETAL: No joint pain, erythema, or swelling; no back pain  SKIN: No itching, rashes  All other ROS negative except noted above      Prior hospital charts reviewed [Yes]  Primary team notes reviewed [Yes]  Other consultant notes reviewed [Yes]    Allergies:  No Known Allergies      ANTIMICROBIALS:   azithromycin  IVPB 500 every 24 hours  cefepime   IVPB 2000 every 8 hours  cefepime   IVPB        OTHER MEDS: MEDICATIONS  (STANDING):  acetaminophen     Tablet .. 650 every 6 hours PRN  albuterol    90 MICROgram(s) HFA Inhaler 2 every 4 hours PRN  albuterol/ipratropium for Nebulization 3 every 6 hours  budesonide 160 MICROgram(s)/formoterol 4.5 MICROgram(s) Inhaler 2 two times a day  guaifenesin/dextromethorphan Oral Liquid 10 every 6 hours PRN  heparin   Injectable 5000 every 12 hours  influenza  Vaccine (HIGH DOSE) 0.5 once  montelukast 10 daily  ondansetron Injectable 4 every 6 hours PRN  pantoprazole    Tablet 40 before breakfast  senna 2 at bedtime PRN      Vital Signs Last 24 Hrs  T(F): 98.7 (12-31-24 @ 20:15), Max: 99 (12-28-24 @ 16:33)    Vital Signs Last 24 Hrs  HR: 80 (12-31-24 @ 20:15) (80 - 103)  BP: 138/78 (12-31-24 @ 20:15) (114/67 - 138/78)  RR: 18 (12-31-24 @ 20:15)  SpO2: 95% (12-31-24 @ 20:15) (93% - 95%)  Wt(kg): --    EXAM:  GENERAL: NAD, lying in bed  HEAD: No head lesions  EYES: Conjunctiva pink and cornea white  EAR, NOSE, MOUTH, THROAT: Normal external ears and nose, no discharges; moist mucous membranes  NECK: Supple, nontender to palpation; no JVD  RESPIRATORY: Bilateral rhronchi, slightly improved  CARDIOVASCULAR: S1 S2  GASTROINTESTINAL: Soft, nontender, nondistended; normoactive bowel sounds  GENITOURINARY: No henry catheter, no CVA tenderness  EXTREMITIES: No clubbing, cyanosis, or petal edema  NERVOUS SYSTEM: Alert and oriented to person, time, place and situation, speech clear. No focal deficits   MUSCULOSKELETAL: No joint erythema, swelling or pain  SKIN: No rashes or lesions, no superficial thrombophlebitis  PSYCH: Normal affect    Labs:                        11.0   8.68  )-----------( 302      ( 30 Dec 2024 06:14 )             35.1     12-30    140  |  103  |  26[H]  ----------------------------<  84  4.6   |  28  |  1.1    Ca    8.6      30 Dec 2024 06:14        WBC Trend:  WBC Count: 8.68 (12-30-24 @ 06:14)  WBC Count: 6.26 (12-29-24 @ 08:43)  WBC Count: 11.25 (12-28-24 @ 19:50)      Creatine Trend:  Creatinine: 1.1 (12-30)  Creatinine: 0.8 (12-29)  Creatinine: 0.9 (12-28)      Liver Biochemical Testing Trend:  Alanine Aminotransferase (ALT/SGPT): 17 (12-29)  Alanine Aminotransferase (ALT/SGPT): 18 (12-28)  Aspartate Aminotransferase (AST/SGOT): 21 (12-29-24 @ 08:43)  Aspartate Aminotransferase (AST/SGOT): 21 (12-28-24 @ 19:50)  Bilirubin Total: <0.2 (12-29)  Bilirubin Total: 0.3 (12-28)      Trend LDH      Urinalysis Basic - ( 30 Dec 2024 06:14 )    Color: x / Appearance: x / SG: x / pH: x  Gluc: 84 mg/dL / Ketone: x  / Bili: x / Urobili: x   Blood: x / Protein: x / Nitrite: x   Leuk Esterase: x / RBC: x / WBC x   Sq Epi: x / Non Sq Epi: x / Bacteria: x        MICROBIOLOGY:    MRSA PCR Result.: Negative (12-29-24 @ 11:08)      Culture - Sputum (collected 29 Dec 2024 10:34)  Source: .Sputum Sputum  Final Report:    Commensal dyana consistent with body site    Culture - Blood (collected 28 Dec 2024 23:22)  Source: .Blood BLOOD  Preliminary Report:    No growth at 48 Hours    Culture - Blood (collected 28 Dec 2024 23:22)  Source: .Blood BLOOD  Preliminary Report:    No growth at 48 Hours    Culture - Blood (collected 17 Nov 2023 17:00)  Source: .Blood Blood-Peripheral  Final Report:    No growth at 5 days    Culture - Blood (collected 17 Nov 2023 17:00)  Source: .Blood Blood-Peripheral  Final Report:    No growth at 5 days    Culture - Urine (collected 17 Feb 2023 22:12)  Source: Clean Catch Clean Catch (Midstream)  Final Report:    No growth    Culture - Blood (collected 17 Feb 2023 19:18)  Source: .Blood Blood-Peripheral  Final Report:    No Growth Final    Culture - Blood (collected 17 Feb 2023 19:16)  Source: .Blood Blood-Peripheral  Final Report:    No Growth Final    Rapid RVP Result: NotDetec (12-29 @ 16:00)  Legionella Antigen, Urine: Negative (12-29 @ 05:40)      RADIOLOGY:  imaging below personally reviewed    < from: CT Angio Chest PE Protocol w/ IV Cont (12.28.24 @ 22:19) >  IMPRESSION:  Bilateral scattered patchy and nodular airspace opacities with more   confluent left lower lobe consolidative opacification, consistent with   pneumonia in the appropriate clinical setting.  Small left pleural effusion.  No evidence of acute pulmonary embolism.  Evaluation for pulmonary nodules, treated lung neoplasm limited by   patient condition.    < end of copied text >

## 2024-12-31 NOTE — PROGRESS NOTE ADULT - ASSESSMENT
79-year-old male past medical history of COPD with home O2 as needed, active lung CA on immunotherapy, recently finished chemo, presents with worsening shortness of breath and cough x 3 days, with associated chills. Endorses shortness of breath with exertion. Admitted for acute on chronic hypoxic respiratory failure 2/2 PNA.    #Acute on chronic hypoxic respiratory failure  #Sepsis on presentation 2/2 PNA  #hx of COPD on home O2 as needed  #Lung Ca on immunotherapy  - CTA chest: No PE. Bilateral scattered patchy and nodular airspace opacities with more confluent left lower lobe consolidative opacification. Small left pleural effusion. No evidence of acute pulmonary embolism.   - no evidence of drug pneumonitis on CT, patient denies using checkpoint inhibitor therapy  - on 3L NC  - Flu/RSV/COVID negative, expanded RVP also expectantly negative  - BNP elevated  - TTE 12/30/24: LVEF 55-60%, mod/severe MR  - strep/legionella negative, MRSA negative  - blood culture and sputum cultures negative  - c/w cefepime, azithromycin  - c/w duonebs, advair (or symbicort if patient uses his own), singulair  - wean O2 as tolerated    Pt covered by [facility] insurance for melanoma, basal cell carcinoma, COPD, and Lung cancer.    Misc:  DVT ppx: heparin subQ  GI ppx: Protonix  Diet: DASH/TLC  Activity: IAT  Code: Full Code  Dispo: Acute     Pending/Handoff: wean O2, f/u Id recs, possible dc in 24-48 hours

## 2025-01-01 ENCOUNTER — TRANSCRIPTION ENCOUNTER (OUTPATIENT)
Age: 80
End: 2025-01-01

## 2025-01-01 VITALS
TEMPERATURE: 98 F | SYSTOLIC BLOOD PRESSURE: 129 MMHG | OXYGEN SATURATION: 94 % | DIASTOLIC BLOOD PRESSURE: 70 MMHG | HEART RATE: 83 BPM | RESPIRATION RATE: 18 BRPM

## 2025-01-01 PROCEDURE — 99239 HOSP IP/OBS DSCHRG MGMT >30: CPT

## 2025-01-01 RX ORDER — BUDESONIDE AND FORMOTEROL FUMARATE 160; 4.5 UG/1; UG/1
2 AEROSOL, METERED RESPIRATORY (INHALATION)
Qty: 1 | Refills: 0
Start: 2025-01-01

## 2025-01-01 RX ORDER — ALBUTEROL SULFATE 90 UG/1
2 INHALANT RESPIRATORY (INHALATION)
Qty: 1 | Refills: 0
Start: 2025-01-01

## 2025-01-01 RX ORDER — LEVOFLOXACIN 250 MG
1 TABLET ORAL
Qty: 6 | Refills: 0
Start: 2025-01-01 | End: 2025-01-06

## 2025-01-01 RX ADMIN — HEPARIN SODIUM 5000 UNIT(S): 1000 INJECTION, SOLUTION INTRAVENOUS; SUBCUTANEOUS at 05:25

## 2025-01-01 RX ADMIN — BUDESONIDE AND FORMOTEROL FUMARATE 2 PUFF(S): 160; 4.5 AEROSOL, METERED RESPIRATORY (INHALATION) at 12:46

## 2025-01-01 RX ADMIN — Medication 100 MILLIGRAM(S): at 05:25

## 2025-01-01 RX ADMIN — MONTELUKAST SODIUM 10 MILLIGRAM(S): 10 TABLET, FILM COATED ORAL at 12:47

## 2025-01-01 RX ADMIN — Medication 1 MILLIGRAM(S): at 12:48

## 2025-01-01 RX ADMIN — Medication 100 MILLIGRAM(S): at 12:47

## 2025-01-01 RX ADMIN — Medication 10 MILLILITER(S): at 12:47

## 2025-01-01 RX ADMIN — PANTOPRAZOLE 40 MILLIGRAM(S): 40 TABLET, DELAYED RELEASE ORAL at 05:25

## 2025-01-01 RX ADMIN — IPRATROPIUM BROMIDE AND ALBUTEROL SULFATE 3 MILLILITER(S): .5; 2.5 SOLUTION RESPIRATORY (INHALATION) at 07:53

## 2025-01-01 RX ADMIN — AZITHROMYCIN MONOHYDRATE 255 MILLIGRAM(S): 200 POWDER, FOR SUSPENSION ORAL at 01:34

## 2025-01-01 NOTE — DISCHARGE NOTE NURSING/CASE MANAGEMENT/SOCIAL WORK - FINANCIAL ASSISTANCE
[facility] provides services at a reduced cost to those who are determined to be eligible through [facility]’s financial assistance program. Information regarding [facility]’s financial assistance program can be found by going to [email or web address] or by calling [phone].

## 2025-01-01 NOTE — DISCHARGE NOTE PROVIDER - HOSPITAL COURSE
79-year-old male past medical history of COPD with home O2 as needed, active lung CA on immunotherapy, recently finished chemo, presents with worsening shortness of breath and cough X 3 days.        #Acute on chronic hypoxic respiratory failure  #Sepsis on presentation 2/2 PNA  #hx of COPD on home O2 as needed  #Lung Ca on immunotherapy  #Immunocompromised host  - CTA chest: No PE. Bilateral scattered patchy and nodular airspace opacities with more confluent left lower lobe consolidative opacification. Small left pleural effusion. No evidence of acute pulmonary embolism.   - no evidence of drug pneumonitis on CT, patient denies using checkpoint inhibitor therapy  - on 3L NC  - Flu/RSV/COVID negative, expanded RVP also expectantly negative  - BNP elevated  - TTE 12/30/24: LVEF 55-60%, mod/severe MR  - strep/legionella negative, MRSA negative  - blood culture and sputum cultures negative  - c/w cefepime, azithromycin-->DC on Levaquin 750mg daily to complete 10 days of treatment (until 1/6/2025) as per ID recs  - c/w duoneanselmo, advair (or symbicort if patient uses his own), singulair  - wean O2 as tolerated, patient has home O2 that he wears intermittently. Prefers to go home today and use his O2 as he feels much better. Refused home care. Clear for d/c. If patient starts worsening in the next few days then CAP less likely and will need to consider IRAE pneumonitis as another differential. Stable for d/c currently.    Patient advised to follow with medical oncology and PCP (outside [facility]).    Pt covered by [facility] insurance for melanoma, basal cell carcinoma, COPD, and Lung cancer.

## 2025-01-01 NOTE — DISCHARGE NOTE PROVIDER - DISCHARGE SERVICE FOR PATIENT
on the discharge service for the patient. I have reviewed and made amendments to the documentation where necessary.
darnell all pertinent systems normal

## 2025-01-01 NOTE — DISCHARGE NOTE NURSING/CASE MANAGEMENT/SOCIAL WORK - PATIENT PORTAL LINK FT
You can access the FollowMyHealth Patient Portal offered by [facility] by registering at the following website: [email or web address]. By joining [employer]’s FollowMyHealth portal, you will also be able to view your health information using other applications (apps) compatible with our system.

## 2025-01-01 NOTE — DISCHARGE NOTE PROVIDER - NSDCCPCAREPLAN_GEN_ALL_CORE_FT
PRINCIPAL DISCHARGE DIAGNOSIS  Diagnosis: Pneumonia  Assessment and Plan of Treatment:       SECONDARY DISCHARGE DIAGNOSES  Diagnosis: Pneumonia  Assessment and Plan of Treatment:     Diagnosis: Hypoxia  Assessment and Plan of Treatment:     Diagnosis: COPD exacerbation  Assessment and Plan of Treatment:

## 2025-01-01 NOTE — PROGRESS NOTE ADULT - SUBJECTIVE AND OBJECTIVE BOX
JAMEL WATERS  [facility] 4I 021 A ([facility]-S 4I)            Patient was evaluated and examined  by bedside,                 REVIEW OF SYSTEMS:  please see pertinent positives mentioned above, all other 12 ROS negative      T(C): , Max: 37.1 (12-31-24 @ 20:15)  HR: 83 (01-01-25 @ 04:10)  BP: 117/65 (01-01-25 @ 04:10)  RR: 18 (01-01-25 @ 04:10)  SpO2: 96% (01-01-25 @ 04:10)  CAPILLARY BLOOD GLUCOSE          PHYSICAL EXAM:  General: NAD, AAOX3, patient is laying comfortably in bed  HEENT: AT, NC, Supple, NO JVD, NO CB  Lungs: CTA B/L, no wheezing, no rhonchi  CVS: normal S1, S2, RRR, NO M/G/R  Abdomen: soft, bowel sounds present, non-tender, non-distended  Extremities: no edema, no clubbing, no cyanosis, positive peripheral pulses b/l  Neuro: no acute focal neurological deficits  Skin: no rush, no ecchymosis      LAB  CBC  Date: 12-30-24 @ 06:14  Mean cell Ainfygqzcp40.8  Mean cell Hemoglobin Conc31.3  Mean cell Volum 95.1  Platelet count-Automate 302  RBC Count 3.69  Red Cell Distrib Width14.9  WBC Count8.68  % Albumin, Urine--  Hematocrit 35.1  Hemoglobin 11.0  CBC  Date: 12-29-24 @ 08:43  Mean cell Pvzhlxzjgq19.7  Mean cell Hemoglobin Conc31.2  Mean cell Volum 95.1  Platelet count-Automate 260  RBC Count 4.08  Red Cell Distrib Width14.8  WBC Count6.26  % Albumin, Urine--  Hematocrit 38.8  Hemoglobin 12.1  CBC  Date: 12-28-24 @ 19:50  Mean cell Vltkosjivs37.8  Mean cell Hemoglobin Conc31.9  Mean cell Volum 93.3  Platelet count-Automate 270  RBC Count 4.20  Red Cell Distrib Width15.0  WBC Count11.25  % Albumin, Urine--  Hematocrit 39.2  Hemoglobin 12.5    BMP  12-30-24 @ 06:14  Blood Gas Arterial-Calcium,Ionized--  Blood Urea Nitrogen, Serum 26 mg/dL[H] [10 - 20]  Carbon Dioxide, Serum28 mmol/L [17 - 32]  Chloride, Lvmux830 mmol/L [98 - 110]  Creatinie, Serum1.1 mg/dL [0.7 - 1.5]  Glucose, Serum84 mg/dL [70 - 99]  Potassium, Serum4.6 mmol/L [3.5 - 5.0]  Sodium, Serum 140 mmol/L [135 - 146]  BMP  12-29-24 @ 08:43  Blood Gas Arterial-Calcium,Ionized--  Blood Urea Nitrogen, Serum 17 mg/dL [10 - 20]  Carbon Dioxide, Serum28 mmol/L [17 - 32]  Chloride, Ebsfr391 mmol/L [98 - 110]  Creatinie, Serum0.8 mg/dL [0.7 - 1.5]  Glucose, Byjdt706 mg/dL[H] [70 - 99]  Potassium, Serum5.6 mmol/L[H] [3.5 - 5.0]  Sodium, Serum 140 mmol/L [135 - 146]  BMP  12-28-24 @ 19:50  Blood Gas Arterial-Calcium,Ionized--  Blood Urea Nitrogen, Serum 19 mg/dL [10 - 20]  Carbon Dioxide, Serum27 mmol/L [17 - 32]  Chloride, Yqohr377 mmol/L [98 - 110]  Creatinie, Serum0.9 mg/dL [0.7 - 1.5]  Glucose, Snutm701 mg/dL[H] [70 - 99]  Potassium, Serum4.7 mmol/L [3.5 - 5.0]  Sodium, Serum 138 mmol/L [135 - 146]              Microbiology:    Culture - Sputum (collected 12-29-24 @ 10:34)  Source: .Sputum Sputum  Gram Stain (12-29-24 @ 22:14):    Numerous polymorphonuclear leukocytes per low power field    No Squamous epithelial cells per low power field    Few Gram Positive Cocci in Pairs and Chains and clusters per oil power    field  Final Report (12-31-24 @ 11:14):    Commensal dyana consistent with body site    Culture - Blood (collected 12-28-24 @ 23:22)  Source: .Blood BLOOD  Preliminary Report (12-31-24 @ 22:01):    No growth at 48 Hours    Culture - Blood (collected 12-28-24 @ 23:22)  Source: .Blood BLOOD  Preliminary Report (12-31-24 @ 22:01):    No growth at 48 Hours        RADIOLOGY & ADDITIONAL TESTS:        Medications:  acetaminophen     Tablet .. 650 milliGRAM(s) Oral every 6 hours PRN  albuterol    90 MICROgram(s) HFA Inhaler 2 Puff(s) Inhalation every 4 hours PRN  albuterol/ipratropium for Nebulization 3 milliLiter(s) Nebulizer every 6 hours  azithromycin  IVPB 500 milliGRAM(s) IV Intermittent every 24 hours  budesonide 160 MICROgram(s)/formoterol 4.5 MICROgram(s) Inhaler 2 Puff(s) Inhalation two times a day  cefepime   IVPB 2000 milliGRAM(s) IV Intermittent every 8 hours  cefepime   IVPB      folic acid 1 milliGRAM(s) Oral daily  guaifenesin/dextromethorphan Oral Liquid 10 milliLiter(s) Oral every 6 hours PRN  heparin   Injectable 5000 Unit(s) SubCutaneous every 12 hours  influenza  Vaccine (HIGH DOSE) 0.5 milliLiter(s) IntraMuscular once  montelukast 10 milliGRAM(s) Oral daily  ondansetron Injectable 4 milliGRAM(s) IV Push every 6 hours PRN  pantoprazole    Tablet 40 milliGRAM(s) Oral before breakfast  senna 2 Tablet(s) Oral at bedtime PRN        Assessment and Plan:  79-year-old male past medical history of COPD with home O2 as needed, active lung CA on immunotherapy, recently finished chemo, presents with worsening shortness of breath and cough X 3 days.        #Acute on chronic hypoxic respiratory failure  #Sepsis on presentation 2/2 PNA  #hx of COPD on home O2 as needed  #Lung Ca on immunotherapy  - CTA chest: No PE. Bilateral scattered patchy and nodular airspace opacities with more confluent left lower lobe consolidative opacification. Small left pleural effusion. No evidence of acute pulmonary embolism.   - no evidence of drug pneumonitis on CT, patient denies using checkpoint inhibitor therapy  - on 3L NC  - Flu/RSV/COVID negative, expanded RVP also expectantly negative  - BNP elevated  - TTE 12/30/24: LVEF 55-60%, mod/severe MR  - strep/legionella negative, MRSA negative  - blood culture and sputum cultures negative  - c/w cefepime, azithromycin  - c/w duonebs, advair (or symbicort if patient uses his own), singulair  - wean O2 as tolerated    Pt covered by [facility] insurance for melanoma, basal cell carcinoma, COPD, and Lung cancer.    Misc:  DVT ppx: heparin subQ  GI ppx: Protonix  Diet: DASH/TLC  Activity: IAT  Code: Full Code  Dispo: Acute     Pending/Handoff: wean O2, f/u Id recs, possible dc in 24-48 hours       JAMEL WATERS  [facility] 4I 021 A ([facility]-S 4I)            Patient was evaluated and examined  by bedside.                 REVIEW OF SYSTEMS:  please see pertinent positives mentioned above, all other 12 ROS negative      T(C): , Max: 37.1 (12-31-24 @ 20:15)  HR: 83 (01-01-25 @ 04:10)  BP: 117/65 (01-01-25 @ 04:10)  RR: 18 (01-01-25 @ 04:10)  SpO2: 96% (01-01-25 @ 04:10)  CAPILLARY BLOOD GLUCOSE          PHYSICAL EXAM:  General: NAD, comfortable in bed  HEENT: NCAT  Lungs: No increased WOB  CVS: RRR  Abdomen: , non-distended  Extremities: no edema      LAB  CBC  Date: 12-30-24 @ 06:14  Mean cell Zutcqydouu99.8  Mean cell Hemoglobin Conc31.3  Mean cell Volum 95.1  Platelet count-Automate 302  RBC Count 3.69  Red Cell Distrib Width14.9  WBC Count8.68  % Albumin, Urine--  Hematocrit 35.1  Hemoglobin 11.0  CBC  Date: 12-29-24 @ 08:43  Mean cell Nflmhjihdf61.7  Mean cell Hemoglobin Conc31.2  Mean cell Volum 95.1  Platelet count-Automate 260  RBC Count 4.08  Red Cell Distrib Width14.8  WBC Count6.26  % Albumin, Urine--  Hematocrit 38.8  Hemoglobin 12.1  CBC  Date: 12-28-24 @ 19:50  Mean cell Rbpvqgrgqh53.8  Mean cell Hemoglobin Conc31.9  Mean cell Volum 93.3  Platelet count-Automate 270  RBC Count 4.20  Red Cell Distrib Width15.0  WBC Count11.25  % Albumin, Urine--  Hematocrit 39.2  Hemoglobin 12.5    [facility]  12-30-24 @ 06:14  Blood Gas Arterial-Calcium,Ionized--  Blood Urea Nitrogen, Serum 26 mg/dL[H] [10 - 20]  Carbon Dioxide, Serum28 mmol/L [17 - 32]  Chloride, Vcmbz027 mmol/L [98 - 110]  Creatinie, Serum1.1 mg/dL [0.7 - 1.5]  Glucose, Serum84 mg/dL [70 - 99]  Potassium, Serum4.6 mmol/L [3.5 - 5.0]  Sodium, Serum 140 mmol/L [135 - 146]  [facility]  12-29-24 @ 08:43  Blood Gas Arterial-Calcium,Ionized--  Blood Urea Nitrogen, Serum 17 mg/dL [10 - 20]  Carbon Dioxide, Serum28 mmol/L [17 - 32]  Chloride, Roxyt197 mmol/L [98 - 110]  Creatinie, Serum0.8 mg/dL [0.7 - 1.5]  Glucose, Lfqgt858 mg/dL[H] [70 - 99]  Potassium, Serum5.6 mmol/L[H] [3.5 - 5.0]  Sodium, Serum 140 mmol/L [135 - 146]  BMP  12-28-24 @ 19:50  Blood Gas Arterial-Calcium,Ionized--  Blood Urea Nitrogen, Serum 19 mg/dL [10 - 20]  Carbon Dioxide, Serum27 mmol/L [17 - 32]  Chloride, Osnke732 mmol/L [98 - 110]  Creatinie, Serum0.9 mg/dL [0.7 - 1.5]  Glucose, Soglh225 mg/dL[H] [70 - 99]  Potassium, Serum4.7 mmol/L [3.5 - 5.0]  Sodium, Serum 138 mmol/L [135 - 146]              Microbiology:    Culture - Sputum (collected 12-29-24 @ 10:34)  Source: .Sputum Sputum  Gram Stain (12-29-24 @ 22:14):    Numerous polymorphonuclear leukocytes per low power field    No Squamous epithelial cells per low power field    Few Gram Positive Cocci in Pairs and Chains and clusters per oil power    field  Final Report (12-31-24 @ 11:14):    Commensal dyana consistent with body site    Culture - Blood (collected 12-28-24 @ 23:22)  Source: .Blood BLOOD  Preliminary Report (12-31-24 @ 22:01):    No growth at 48 Hours    Culture - Blood (collected 12-28-24 @ 23:22)  Source: .Blood BLOOD  Preliminary Report (12-31-24 @ 22:01):    No growth at 48 Hours        RADIOLOGY & ADDITIONAL TESTS:        Medications:  acetaminophen     Tablet .. 650 milliGRAM(s) Oral every 6 hours PRN  albuterol    90 MICROgram(s) HFA Inhaler 2 Puff(s) Inhalation every 4 hours PRN  albuterol/ipratropium for Nebulization 3 milliLiter(s) Nebulizer every 6 hours  azithromycin  IVPB 500 milliGRAM(s) IV Intermittent every 24 hours  budesonide 160 MICROgram(s)/formoterol 4.5 MICROgram(s) Inhaler 2 Puff(s) Inhalation two times a day  cefepime   IVPB 2000 milliGRAM(s) IV Intermittent every 8 hours  cefepime   IVPB      folic acid 1 milliGRAM(s) Oral daily  guaifenesin/dextromethorphan Oral Liquid 10 milliLiter(s) Oral every 6 hours PRN  heparin   Injectable 5000 Unit(s) SubCutaneous every 12 hours  influenza  Vaccine (HIGH DOSE) 0.5 milliLiter(s) IntraMuscular once  montelukast 10 milliGRAM(s) Oral daily  ondansetron Injectable 4 milliGRAM(s) IV Push every 6 hours PRN  pantoprazole    Tablet 40 milliGRAM(s) Oral before breakfast  senna 2 Tablet(s) Oral at bedtime PRN        Assessment and Plan:  79-year-old male past medical history of COPD with home O2 as needed, active lung CA on immunotherapy, recently finished chemo, presents with worsening shortness of breath and cough X 3 days.        #Acute on chronic hypoxic respiratory failure  #Sepsis on presentation 2/2 PNA  #hx of COPD on home O2 as needed  #Lung Ca on immunotherapy  #Immunocompromised host  - CTA chest: No PE. Bilateral scattered patchy and nodular airspace opacities with more confluent left lower lobe consolidative opacification. Small left pleural effusion. No evidence of acute pulmonary embolism.   - no evidence of drug pneumonitis on CT, patient denies using checkpoint inhibitor therapy  - on 3L NC  - Flu/RSV/COVID negative, expanded RVP also expectantly negative  - BNP elevated  - TTE 12/30/24: LVEF 55-60%, mod/severe MR  - strep/legionella negative, MRSA negative  - blood culture and sputum cultures negative  - c/w cefepime, azithromycin-->DC on Levaquin 750mg daily to complete 10 days of treatment (until 1/6/2025) as per ID recs  - c/w duonebs, advair (or symbicort if patient uses his own), singulair  - wean O2 as tolerated, patient has home O2 that he wears intermittently. Prefers to go home today and use his O2 as he feels much better. Refused home care. Clear for d/c. If patient starts worsening in the next few days then CAP less likely and will need to consider IRAE pneumonitis as another differential. Stable for d/c currently.    Pt covered by [facility] insurance for melanoma, basal cell carcinoma, COPD, and Lung cancer.    Misc:  DVT ppx: heparin subQ  GI ppx: Protonix  Diet: DASH/TLC  Activity: IAT  Code: Full Code  Dispo: Home today

## 2025-01-02 ENCOUNTER — TRANSCRIPTION ENCOUNTER (OUTPATIENT)
Age: 80
End: 2025-01-02

## 2025-01-03 ENCOUNTER — TRANSCRIPTION ENCOUNTER (OUTPATIENT)
Age: 80
End: 2025-01-03

## 2025-01-07 DIAGNOSIS — A41.9 SEPSIS, UNSPECIFIED ORGANISM: ICD-10-CM

## 2025-01-07 DIAGNOSIS — J96.21 ACUTE AND CHRONIC RESPIRATORY FAILURE WITH HYPOXIA: ICD-10-CM

## 2025-01-07 DIAGNOSIS — J44.1 CHRONIC OBSTRUCTIVE PULMONARY DISEASE WITH (ACUTE) EXACERBATION: ICD-10-CM

## 2025-01-07 DIAGNOSIS — Z79.52 LONG TERM (CURRENT) USE OF SYSTEMIC STEROIDS: ICD-10-CM

## 2025-01-07 DIAGNOSIS — C34.90 MALIGNANT NEOPLASM OF UNSPECIFIED PART OF UNSPECIFIED BRONCHUS OR LUNG: ICD-10-CM

## 2025-01-07 DIAGNOSIS — D84.9 IMMUNODEFICIENCY, UNSPECIFIED: ICD-10-CM

## 2025-01-07 DIAGNOSIS — Z92.21 PERSONAL HISTORY OF ANTINEOPLASTIC CHEMOTHERAPY: ICD-10-CM

## 2025-01-07 DIAGNOSIS — Z99.81 DEPENDENCE ON SUPPLEMENTAL OXYGEN: ICD-10-CM

## 2025-01-09 ENCOUNTER — EMERGENCY (EMERGENCY)
Facility: HOSPITAL | Age: 80
LOS: 0 days | Discharge: HOME | End: 2025-01-10
Attending: STUDENT IN AN ORGANIZED HEALTH CARE EDUCATION/TRAINING PROGRAM
Payer: MEDICARE

## 2025-01-09 VITALS
DIASTOLIC BLOOD PRESSURE: 79 MMHG | TEMPERATURE: 97 F | HEART RATE: 77 BPM | SYSTOLIC BLOOD PRESSURE: 156 MMHG | RESPIRATION RATE: 26 BRPM | OXYGEN SATURATION: 89 %

## 2025-01-09 DIAGNOSIS — R06.02 SHORTNESS OF BREATH: ICD-10-CM

## 2025-01-09 DIAGNOSIS — H26.40 UNSPECIFIED SECONDARY CATARACT: Chronic | ICD-10-CM

## 2025-01-09 DIAGNOSIS — Z98.890 OTHER SPECIFIED POSTPROCEDURAL STATES: Chronic | ICD-10-CM

## 2025-01-09 DIAGNOSIS — Z93.8 OTHER ARTIFICIAL OPENING STATUS: Chronic | ICD-10-CM

## 2025-01-09 DIAGNOSIS — R06.2 WHEEZING: ICD-10-CM

## 2025-01-09 DIAGNOSIS — Z85.118 PERSONAL HISTORY OF OTHER MALIGNANT NEOPLASM OF BRONCHUS AND LUNG: ICD-10-CM

## 2025-01-09 DIAGNOSIS — Z87.891 PERSONAL HISTORY OF NICOTINE DEPENDENCE: ICD-10-CM

## 2025-01-09 DIAGNOSIS — Z99.81 DEPENDENCE ON SUPPLEMENTAL OXYGEN: ICD-10-CM

## 2025-01-09 DIAGNOSIS — J44.1 CHRONIC OBSTRUCTIVE PULMONARY DISEASE WITH (ACUTE) EXACERBATION: ICD-10-CM

## 2025-01-09 LAB
ALBUMIN SERPL ELPH-MCNC: 3.8 G/DL — SIGNIFICANT CHANGE UP (ref 3.5–5.2)
ALP SERPL-CCNC: 114 U/L — SIGNIFICANT CHANGE UP (ref 30–115)
ALT FLD-CCNC: 15 U/L — SIGNIFICANT CHANGE UP (ref 0–41)
ANION GAP SERPL CALC-SCNC: 12 MMOL/L — SIGNIFICANT CHANGE UP (ref 7–14)
AST SERPL-CCNC: 26 U/L — SIGNIFICANT CHANGE UP (ref 0–41)
BASE EXCESS BLDV CALC-SCNC: 12.8 MMOL/L — HIGH (ref -2–3)
BASE EXCESS BLDV CALC-SCNC: 13.8 MMOL/L — HIGH (ref -2–3)
BASOPHILS # BLD AUTO: 0.06 K/UL — SIGNIFICANT CHANGE UP (ref 0–0.2)
BASOPHILS NFR BLD AUTO: 0.6 % — SIGNIFICANT CHANGE UP (ref 0–1)
BILIRUB SERPL-MCNC: 0.3 MG/DL — SIGNIFICANT CHANGE UP (ref 0.2–1.2)
BUN SERPL-MCNC: 22 MG/DL — HIGH (ref 10–20)
CA-I SERPL-SCNC: 1.2 MMOL/L — SIGNIFICANT CHANGE UP (ref 1.15–1.33)
CA-I SERPL-SCNC: 1.21 MMOL/L — SIGNIFICANT CHANGE UP (ref 1.15–1.33)
CALCIUM SERPL-MCNC: 8.9 MG/DL — SIGNIFICANT CHANGE UP (ref 8.4–10.5)
CHLORIDE SERPL-SCNC: 99 MMOL/L — SIGNIFICANT CHANGE UP (ref 98–110)
CO2 SERPL-SCNC: 31 MMOL/L — SIGNIFICANT CHANGE UP (ref 17–32)
CREAT SERPL-MCNC: 0.9 MG/DL — SIGNIFICANT CHANGE UP (ref 0.7–1.5)
EGFR: 86 ML/MIN/1.73M2 — SIGNIFICANT CHANGE UP
EOSINOPHIL # BLD AUTO: 0.18 K/UL — SIGNIFICANT CHANGE UP (ref 0–0.7)
EOSINOPHIL NFR BLD AUTO: 1.9 % — SIGNIFICANT CHANGE UP (ref 0–8)
FLUAV AG NPH QL: SIGNIFICANT CHANGE UP
FLUBV AG NPH QL: SIGNIFICANT CHANGE UP
GAS PNL BLDV: 135 MMOL/L — LOW (ref 136–145)
GAS PNL BLDV: 137 MMOL/L — SIGNIFICANT CHANGE UP (ref 136–145)
GAS PNL BLDV: SIGNIFICANT CHANGE UP
GAS PNL BLDV: SIGNIFICANT CHANGE UP
GLUCOSE SERPL-MCNC: 92 MG/DL — SIGNIFICANT CHANGE UP (ref 70–99)
HCO3 BLDV-SCNC: 42 MMOL/L — HIGH (ref 22–29)
HCO3 BLDV-SCNC: 42 MMOL/L — HIGH (ref 22–29)
HCT VFR BLD CALC: 44.4 % — SIGNIFICANT CHANGE UP (ref 42–52)
HCT VFR BLDA CALC: 36 % — LOW (ref 39–51)
HCT VFR BLDA CALC: 41 % — SIGNIFICANT CHANGE UP (ref 39–51)
HGB BLD CALC-MCNC: 12 G/DL — LOW (ref 12.6–17.4)
HGB BLD CALC-MCNC: 13.6 G/DL — SIGNIFICANT CHANGE UP (ref 12.6–17.4)
HGB BLD-MCNC: 13.2 G/DL — LOW (ref 14–18)
IMM GRANULOCYTES NFR BLD AUTO: 0.3 % — SIGNIFICANT CHANGE UP (ref 0.1–0.3)
LACTATE BLDV-MCNC: 0.7 MMOL/L — SIGNIFICANT CHANGE UP (ref 0.5–2)
LACTATE BLDV-MCNC: 1.6 MMOL/L — SIGNIFICANT CHANGE UP (ref 0.5–2)
LYMPHOCYTES # BLD AUTO: 2.57 K/UL — SIGNIFICANT CHANGE UP (ref 1.2–3.4)
LYMPHOCYTES # BLD AUTO: 26.5 % — SIGNIFICANT CHANGE UP (ref 20.5–51.1)
MCHC RBC-ENTMCNC: 29.6 PG — SIGNIFICANT CHANGE UP (ref 27–31)
MCHC RBC-ENTMCNC: 29.7 G/DL — LOW (ref 32–37)
MCV RBC AUTO: 99.6 FL — HIGH (ref 80–94)
MONOCYTES # BLD AUTO: 0.72 K/UL — HIGH (ref 0.1–0.6)
MONOCYTES NFR BLD AUTO: 7.4 % — SIGNIFICANT CHANGE UP (ref 1.7–9.3)
NEUTROPHILS # BLD AUTO: 6.14 K/UL — SIGNIFICANT CHANGE UP (ref 1.4–6.5)
NEUTROPHILS NFR BLD AUTO: 63.3 % — SIGNIFICANT CHANGE UP (ref 42.2–75.2)
NRBC # BLD: 0 /100 WBCS — SIGNIFICANT CHANGE UP (ref 0–0)
NT-PROBNP SERPL-SCNC: 373 PG/ML — HIGH (ref 0–300)
PCO2 BLDV: 75 MMHG — CRITICAL HIGH (ref 42–55)
PCO2 BLDV: 76 MMHG — CRITICAL HIGH (ref 42–55)
PH BLDV: 7.35 — SIGNIFICANT CHANGE UP (ref 7.32–7.43)
PH BLDV: 7.36 — SIGNIFICANT CHANGE UP (ref 7.32–7.43)
PLATELET # BLD AUTO: 208 K/UL — SIGNIFICANT CHANGE UP (ref 130–400)
PMV BLD: 9.2 FL — SIGNIFICANT CHANGE UP (ref 7.4–10.4)
PO2 BLDV: 33 MMHG — SIGNIFICANT CHANGE UP (ref 25–45)
PO2 BLDV: 38 MMHG — SIGNIFICANT CHANGE UP (ref 25–45)
POTASSIUM BLDV-SCNC: 4.9 MMOL/L — SIGNIFICANT CHANGE UP (ref 3.5–5.1)
POTASSIUM BLDV-SCNC: 5 MMOL/L — SIGNIFICANT CHANGE UP (ref 3.5–5.1)
POTASSIUM SERPL-MCNC: 5.2 MMOL/L — HIGH (ref 3.5–5)
POTASSIUM SERPL-SCNC: 5.2 MMOL/L — HIGH (ref 3.5–5)
PROT SERPL-MCNC: 6.8 G/DL — SIGNIFICANT CHANGE UP (ref 6–8)
RBC # BLD: 4.46 M/UL — LOW (ref 4.7–6.1)
RBC # FLD: 14.7 % — HIGH (ref 11.5–14.5)
RSV RNA NPH QL NAA+NON-PROBE: SIGNIFICANT CHANGE UP
SAO2 % BLDV: 54.8 % — LOW (ref 67–88)
SAO2 % BLDV: 62.4 % — LOW (ref 67–88)
SARS-COV-2 RNA SPEC QL NAA+PROBE: SIGNIFICANT CHANGE UP
SODIUM SERPL-SCNC: 142 MMOL/L — SIGNIFICANT CHANGE UP (ref 135–146)
TROPONIN T, HIGH SENSITIVITY RESULT: 23 NG/L — HIGH (ref 6–21)
TROPONIN T, HIGH SENSITIVITY RESULT: 25 NG/L — HIGH (ref 6–21)
WBC # BLD: 9.7 K/UL — SIGNIFICANT CHANGE UP (ref 4.8–10.8)
WBC # FLD AUTO: 9.7 K/UL — SIGNIFICANT CHANGE UP (ref 4.8–10.8)

## 2025-01-09 PROCEDURE — 84295 ASSAY OF SERUM SODIUM: CPT

## 2025-01-09 PROCEDURE — 83605 ASSAY OF LACTIC ACID: CPT | Mod: 59

## 2025-01-09 PROCEDURE — 85014 HEMATOCRIT: CPT

## 2025-01-09 PROCEDURE — 94640 AIRWAY INHALATION TREATMENT: CPT

## 2025-01-09 PROCEDURE — 85018 HEMOGLOBIN: CPT

## 2025-01-09 PROCEDURE — 80053 COMPREHEN METABOLIC PANEL: CPT

## 2025-01-09 PROCEDURE — 93010 ELECTROCARDIOGRAM REPORT: CPT

## 2025-01-09 PROCEDURE — 71045 X-RAY EXAM CHEST 1 VIEW: CPT | Mod: 26

## 2025-01-09 PROCEDURE — 82330 ASSAY OF CALCIUM: CPT

## 2025-01-09 PROCEDURE — 84132 ASSAY OF SERUM POTASSIUM: CPT

## 2025-01-09 PROCEDURE — 71045 X-RAY EXAM CHEST 1 VIEW: CPT

## 2025-01-09 PROCEDURE — 36415 COLL VENOUS BLD VENIPUNCTURE: CPT

## 2025-01-09 PROCEDURE — 96374 THER/PROPH/DIAG INJ IV PUSH: CPT

## 2025-01-09 PROCEDURE — 99291 CRITICAL CARE FIRST HOUR: CPT | Mod: 25

## 2025-01-09 PROCEDURE — 0241U: CPT

## 2025-01-09 PROCEDURE — 84484 ASSAY OF TROPONIN QUANT: CPT | Mod: 59

## 2025-01-09 PROCEDURE — 83880 ASSAY OF NATRIURETIC PEPTIDE: CPT

## 2025-01-09 PROCEDURE — 85025 COMPLETE CBC W/AUTO DIFF WBC: CPT

## 2025-01-09 PROCEDURE — 82803 BLOOD GASES ANY COMBINATION: CPT | Mod: 59

## 2025-01-09 PROCEDURE — 96376 TX/PRO/DX INJ SAME DRUG ADON: CPT

## 2025-01-09 PROCEDURE — G0378: CPT

## 2025-01-09 PROCEDURE — 93005 ELECTROCARDIOGRAM TRACING: CPT

## 2025-01-09 PROCEDURE — 99291 CRITICAL CARE FIRST HOUR: CPT | Mod: GC

## 2025-01-09 PROCEDURE — 99284 EMERGENCY DEPT VISIT MOD MDM: CPT

## 2025-01-09 RX ORDER — MONTELUKAST SODIUM 10 MG/1
10 TABLET, FILM COATED ORAL DAILY
Refills: 0 | Status: DISCONTINUED | OUTPATIENT
Start: 2025-01-09 | End: 2025-01-10

## 2025-01-09 RX ORDER — IPRATROPIUM BROMIDE AND ALBUTEROL SULFATE .5; 2.5 MG/3ML; MG/3ML
3 SOLUTION RESPIRATORY (INHALATION) EVERY 4 HOURS
Refills: 0 | Status: DISCONTINUED | OUTPATIENT
Start: 2025-01-09 | End: 2025-01-10

## 2025-01-09 RX ORDER — IPRATROPIUM BROMIDE AND ALBUTEROL SULFATE .5; 2.5 MG/3ML; MG/3ML
3 SOLUTION RESPIRATORY (INHALATION) ONCE
Refills: 0 | Status: COMPLETED | OUTPATIENT
Start: 2025-01-09 | End: 2025-01-09

## 2025-01-09 RX ORDER — METHYLPREDNISOLONE 4 MG/1
125 TABLET ORAL ONCE
Refills: 0 | Status: COMPLETED | OUTPATIENT
Start: 2025-01-10 | End: 2025-01-10

## 2025-01-09 RX ORDER — METHYLPREDNISOLONE 4 MG/1
125 TABLET ORAL ONCE
Refills: 0 | Status: COMPLETED | OUTPATIENT
Start: 2025-01-09 | End: 2025-01-09

## 2025-01-09 RX ORDER — FLUTICASONE PROPIONATE AND SALMETEROL 50; 500 UG/1; UG/1
1 POWDER ORAL; RESPIRATORY (INHALATION)
Refills: 0 | Status: DISCONTINUED | OUTPATIENT
Start: 2025-01-09 | End: 2025-01-10

## 2025-01-09 RX ADMIN — METHYLPREDNISOLONE 125 MILLIGRAM(S): 4 TABLET ORAL at 17:45

## 2025-01-09 RX ADMIN — IPRATROPIUM BROMIDE AND ALBUTEROL SULFATE 3 MILLILITER(S): .5; 2.5 SOLUTION RESPIRATORY (INHALATION) at 13:30

## 2025-01-09 RX ADMIN — IPRATROPIUM BROMIDE AND ALBUTEROL SULFATE 3 MILLILITER(S): .5; 2.5 SOLUTION RESPIRATORY (INHALATION) at 22:02

## 2025-01-09 NOTE — ED CDU PROVIDER INITIAL DAY NOTE - ATTENDING APP SHARED VISIT CONTRIBUTION OF CARE
I saw and evaluated the patient on my own.  Briefly, I have the following impression and plan...  COPD exacerbation. Star patient. no need for prolonged admisison. obs stay  Please see MDM for further details.

## 2025-01-09 NOTE — ED PROVIDER NOTE - DIFFERENTIAL DIAGNOSIS
The differential diagnosis for patients clinical presentation includes but is not limited to: copd, pneumonia, viral illness Differential Diagnosis

## 2025-01-09 NOTE — ED PROVIDER NOTE - OBJECTIVE STATEMENT
79-year-old male with past medical history of COPD on home oxygen as needed, lung cancer, presents to the ED for evaluation of worsening shortness of breath over the last week.  Patient was recently admitted for pneumonia and discharged on 1/1/2025; completed course of levofloxacin yesterday.  Patient has been using his home oxygen daily since discharge which was not the case prior to admission.  Patient denies any recent fever, chest pain, nausea, vomiting, or diarrhea.

## 2025-01-09 NOTE — CONSULT NOTE ADULT - SUBJECTIVE AND OBJECTIVE BOX
JT JAMEL  79y, Male  Allergy: No Known Allergies    ED Day: 1    Patient seen and examined earlier today.   son at bedside     PMH/PSH:  COPD, mild  Smoker  Melanoma of skin  Basal cell carcinoma  Lung cancer  Pneumothorax, post biopsy, left  H/O carpal tunnel repair  H/O basal cell carcinoma excision  H/O melanoma excision  After cataract, bilateral  S/P chest tube placement    LAST 12-Hr EVENTS:  patient improved on bipap while in ED     VITALS:  T(F): 97.4 (01-09-25 @ 12:08), Max: 97.4 (01-09-25 @ 12:08)  HR: 77 (01-09-25 @ 12:08)  BP: 156/79 (01-09-25 @ 12:08) (156/79 - 156/79)  RR: 26 (01-09-25 @ 12:08)  SpO2: 97% (01-09-25 @ 12:25)        TESTS & MEASUREMENTS:  Weight/Height/BMI                            13.2   9.70  )-----------( 208      ( 09 Jan 2025 12:25 )             44.4         01-09    142  |  99  |  22[H]  ----------------------------<  92  5.2[H]   |  31  |  0.9    Ca    8.9      09 Jan 2025 12:25    TPro  6.8  /  Alb  3.8  /  TBili  0.3  /  DBili  x   /  AST  26  /  ALT  15  /  AlkPhos  114  01-09    LIVER FUNCTIONS - ( 09 Jan 2025 12:25 )  Alb: 3.8 g/dL / Pro: 6.8 g/dL / ALK PHOS: 114 U/L / ALT: 15 U/L / AST: 26 U/L / GGT: x               Troponin T, High Sensitivity Result: 23 ng/L (01-09-25 @ 14:43)  Troponin T, High Sensitivity Result: 25 ng/L (01-09-25 @ 12:25)      Urinalysis Basic - ( 09 Jan 2025 12:25 )    Color: x / Appearance: x / SG: x / pH: x  Gluc: 92 mg/dL / Ketone: x  / Bili: x / Urobili: x   Blood: x / Protein: x / Nitrite: x   Leuk Esterase: x / RBC: x / WBC x   Sq Epi: x / Non Sq Epi: x / Bacteria: x      RADIOLOGY, ECG, & ADDITIONAL TESTS:  12 Lead ECG:   Ventricular Rate 100 BPM  Atrial Rate 100 BPM  QTC Calculation(Bazett) 423 ms   Normal sinus rhythm  Normal ECG    Confirmed by ULISES GARCÍA MD ([id number]) on 12/29/2024 8:32:23 AM (12-28-24 @ 16:54)      MEDICATIONS:  MEDICATIONS  (STANDING):  methylPREDNISolone sodium succinate Injectable 125 milliGRAM(s) IV Push Once        HOME MEDICATIONS (as per chart review):  Albuterol (Eqv-ProAir HFA) 90 mcg/inh inhalation aerosol (01-01)  albuterol 0.63 mg/3 mL (0.021%) inhalation solution (11-22)  budesonide-formoterol 160 mcg-4.5 mcg/inh inhalation aerosol (01-01)  Centrum Adults oral tablet (11-18)  Colace 50 mg oral capsule (11-18)  folic acid 1 mg oral tablet (11-18)  levoFLOXacin 750 mg oral tablet (01-01)  nebulizer (11-22)  senna leaf extract oral tablet (11-22)  Vitamin D2 50 mcg (2000 intl units) oral capsule (11-18)      PHYSICAL EXAM:  GENERAL: pleasant and in NAD/P  CHEST/LUNG: faint wheezing bilaterally   HEART: S1S2  ABDOMEN: soft   EXTREMITIES:  chronic edema and chronic skin changes

## 2025-01-09 NOTE — ED PROVIDER NOTE - CLINICAL SUMMARY MEDICAL DECISION MAKING FREE TEXT BOX
78 yo M presented to ED for eval of SOB. Labs and EKG were ordered and reviewed. EKG w/o acute ischemic changes. Wheezing on examination, treated for COPD exacerbation. Imaging was ordered and reviewed by me. Unchanged CXR. Appropriate medications for patient's presenting complaints were ordered and effects were reassessed.  patient was initially placed on BiPAP with clinical improvement.  Patient's records (prior hospital, ED visit, and/or nursing home notes if available) were reviewed.  Additional history was obtained from EMS, family, and/or PCP (where available).  Escalation to admission/observation was considered.  Discussed case with Dr. Finnegan (for [facility] patient), recommending ED OBS for rpt VBG in AM and BiPAP 2 hours on 2 hours off for elevated CO2.

## 2025-01-09 NOTE — CONSULT NOTE ADULT - ASSESSMENT
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

## 2025-01-09 NOTE — ED PROVIDER NOTE - CARE PLAN
Assessment and plan of treatment:	Plan- BiPAP, labs, CXR, EKG, reassess   Principal Discharge DX:	Shortness of breath  Assessment and plan of treatment:	Plan- BiPAP, labs, CXR, EKG, reassess  Secondary Diagnosis:	COPD exacerbation   1

## 2025-01-09 NOTE — ED CDU PROVIDER INITIAL DAY NOTE - PHYSICAL EXAMINATION
Gen: Alert, NAD, well appearing  Head: NC, AT, PERRL, EOMI, normal lids/conjunctiva  ENT: normal hearing  Neck: +supple, no tenderness/meningismus,  Pulm: Bilateral BS, normal resp effort, + wheeze b/l  CV: RRR  Abd: soft, NT/ND  Mskel: no edema/erythema/cyanosis  Skin: no rash, warm/dry  Neuro: AAOx3, no sensory/motor deficits

## 2025-01-09 NOTE — ED CDU PROVIDER INITIAL DAY NOTE - OBJECTIVE STATEMENT
80 yo M hx of COPD, lung ca, skin ca c/o SOB. Patient was discharged from hospital on 1/1 with Levaquin for pneumonia. Patient has been SOB since. He finished his course of abx. Since yesterday has had increased SOB on exertion. No fevers, CP, or abdominal pains.

## 2025-01-09 NOTE — ED CLERICAL - NS ED CLERK NOTE PRE-ARRIVAL INFORMATION; ADDITIONAL PRE-ARRIVAL INFORMATION
This patient is enrolled in the [facility]AR readmission reduction initiative and has active care navigation. This patient can be followed up by the care navigation team within 24 hours.  To arrange close follow-up or to obtain additional clinical information, please call the contact number above. The on-call [facility] Hospitalist has been notified and will coordinate care in concert with the ED Physician including consults as necessary. Please reach out to the Hospitalist on-call directly (schedule on AMiON posted on the intranet). You may also call the Hospitalist Division at [phone] at either site. Consider CDU for management per guideline”

## 2025-01-09 NOTE — ED PROVIDER NOTE - PROGRESS NOTE DETAILS
AE: Discussed case in detail with Dr. Finnegan who recommends keeping the patient in obs for 2 hours on/2 hours off BiPAP and repeat VBG's to monitor CO2.

## 2025-01-09 NOTE — ED ADULT TRIAGE NOTE - PATIENT ON (OXYGEN DELIVERY METHOD)
Calm this morning  No HA, visual changes, SOB or CP stemming from elevated BP last night  1:1 @ bedside    Vital Signs Last 24 Hrs  T(C): 36.9 (20 Nov 2023 07:30), Max: 37.1 (19 Nov 2023 19:30)  T(F): 98.5 (20 Nov 2023 07:30), Max: 98.7 (19 Nov 2023 19:30)  HR: 61 (20 Nov 2023 07:30) (55 - 80)  BP: 150/78 (20 Nov 2023 07:30) (143/87 - 201/109)  BP(mean): 103 (20 Nov 2023 05:13) (103 - 148)  RR: 18 (20 Nov 2023 07:30) (17 - 18)  SpO2: 100% (20 Nov 2023 07:30) (95% - 100%)    I&O's Summary      Gen: NAD  Head: NCAT, EOMI  Lungs: CTA b/l  Heart: RRR, nl S1/S2, no murmurs  Abd: soft, NTND, NABS  Neuro: A+O x 2, grossly nonfocal      LABS:                        14.1   5.66  )-----------( 208      ( 20 Nov 2023 05:36 )             41.6     11-20    130<L>  |  93<L>  |  10  ----------------------------<  89  4.0   |  26  |  0.76    Ca    9.7      20 Nov 2023 05:36  Phos  3.9     11-20  Mg     2.00     11-20    TPro  6.9  /  Alb  4.4  /  TBili  0.4  /  DBili  x   /  AST  34  /  ALT  25  /  AlkPhos  117  11-20      CAPILLARY BLOOD GLUCOSE      POCT Blood Glucose.: 101 mg/dL (20 Nov 2023 08:39)  POCT Blood Glucose.: 100 mg/dL (19 Nov 2023 22:01)  POCT Blood Glucose.: 105 mg/dL (19 Nov 2023 17:25)  POCT Blood Glucose.: 100 mg/dL (19 Nov 2023 12:29)        Urinalysis Basic - ( 20 Nov 2023 05:36 )    Color: x / Appearance: x / SG: x / pH: x  Gluc: 89 mg/dL / Ketone: x  / Bili: x / Urobili: x   Blood: x / Protein: x / Nitrite: x   Leuk Esterase: x / RBC: x / WBC x   Sq Epi: x / Non Sq Epi: x / Bacteria: x        RADIOLOGY & ADDITIONAL TESTS:    Imaging Personally Reviewed:  [x] YES  [ ] NO    Case discussed with NPP:  [X] YES  [ ] NO             nasal cannula

## 2025-01-09 NOTE — ED PROVIDER NOTE - ATTENDING CONTRIBUTION TO CARE
79-year-old male past medical history COPD on home oxygen as needed, lung cancer on immunotherapy, recent admission for pneumonia and discharged on January 1, 2025 presents to the emergency department for evaluation of worsening shortness of breath and increased oxygen requirement today.    CONSTITUTIONAL:  Increased WOB. Placed on BiPAP.   SKIN: warm, dry  HEAD: Normocephalic; atraumatic.  ENT: MMM.   NECK: Supple.  CARD: RRR.   RESP: Diffuse wheezing.   ABD: soft ntnd.   EXT: Normal ROM.  B/L LE edema.   NEURO: Alert, oriented, grossly unremarkable.

## 2025-01-09 NOTE — ED CDU PROVIDER INITIAL DAY NOTE - CLINICAL SUMMARY MEDICAL DECISION MAKING FREE TEXT BOX
Patient remained in labs with q. 2 BiPAP for COPD exacerbation with mild retention.  Care coordinated with the hospitalist.  Please see the hospitalist note for further plan.

## 2025-01-09 NOTE — CONSULT NOTE ADULT - NS ATTEST RISK PROBLEM GEN_ALL_CORE FT
acute reactive airways episode requiring continuos Non-invasive positive pressure ventilation (NIPPV), steroids, bronchodilators and constant observation for possible deterioration in resp status.

## 2025-01-10 VITALS
HEART RATE: 106 BPM | OXYGEN SATURATION: 96 % | DIASTOLIC BLOOD PRESSURE: 84 MMHG | RESPIRATION RATE: 18 BRPM | TEMPERATURE: 98 F | SYSTOLIC BLOOD PRESSURE: 186 MMHG

## 2025-01-10 LAB
BASE EXCESS BLDV CALC-SCNC: 12.8 MMOL/L — HIGH (ref -2–3)
CA-I SERPL-SCNC: 1.21 MMOL/L — SIGNIFICANT CHANGE UP (ref 1.15–1.33)
GAS PNL BLDV: 136 MMOL/L — SIGNIFICANT CHANGE UP (ref 136–145)
GAS PNL BLDV: SIGNIFICANT CHANGE UP
GAS PNL BLDV: SIGNIFICANT CHANGE UP
HCO3 BLDV-SCNC: 42 MMOL/L — HIGH (ref 22–29)
HCT VFR BLDA CALC: 38 % — LOW (ref 39–51)
HGB BLD CALC-MCNC: 12.8 G/DL — SIGNIFICANT CHANGE UP (ref 12.6–17.4)
LACTATE BLDV-MCNC: 2 MMOL/L — SIGNIFICANT CHANGE UP (ref 0.5–2)
PCO2 BLDV: 80 MMHG — CRITICAL HIGH (ref 42–55)
PH BLDV: 7.33 — SIGNIFICANT CHANGE UP (ref 7.32–7.43)
PO2 BLDV: 48 MMHG — HIGH (ref 25–45)
POTASSIUM BLDV-SCNC: 4.9 MMOL/L — SIGNIFICANT CHANGE UP (ref 3.5–5.1)
SAO2 % BLDV: 80 % — SIGNIFICANT CHANGE UP (ref 67–88)

## 2025-01-10 PROCEDURE — 99239 HOSP IP/OBS DSCHRG MGMT >30: CPT

## 2025-01-10 PROCEDURE — 99283 EMERGENCY DEPT VISIT LOW MDM: CPT

## 2025-01-10 RX ADMIN — IPRATROPIUM BROMIDE AND ALBUTEROL SULFATE 3 MILLILITER(S): .5; 2.5 SOLUTION RESPIRATORY (INHALATION) at 02:40

## 2025-01-10 RX ADMIN — IPRATROPIUM BROMIDE AND ALBUTEROL SULFATE 3 MILLILITER(S): .5; 2.5 SOLUTION RESPIRATORY (INHALATION) at 04:27

## 2025-01-10 RX ADMIN — METHYLPREDNISOLONE 125 MILLIGRAM(S): 4 TABLET ORAL at 07:56

## 2025-01-10 NOTE — ED CDU PROVIDER DISPOSITION NOTE - CLINICAL COURSE
Patient in obs for syncope. had abnormal CT head so discussed with neuro to get MRI. MRI unremarkable. echo with normal EF and valves. will fu outaptient with cardiology.   likely syncope in setting of diarrhea and dehydration 79-year-old male with a history of COPD presenting with a COPD exacerbation.  Patient is a*patient recently discharged and will present to the emergency room with shortness of breath.  Patient was placed on BiPAP overnight.  Upon reassessment this morning patient reported improvement of his symptoms.  Was satting 99% on his home O2.  Still had some minor wheezing but did not close denies shortness of breath.  Patient's son was at bedside.  collaboration of care with hospitalist Dr. Finnegan.  Patient has resources at home.  Discharged after receiving IV steroids and clinical care

## 2025-01-10 NOTE — ED CDU PROVIDER SUBSEQUENT DAY NOTE - CLINICAL SUMMARY MEDICAL DECISION MAKING FREE TEXT BOX
patient in obs as a [facility]AR patient requreing short-stay after recent admission. care coordinated with hospitalist to manage COPD and shortness of breath

## 2025-01-10 NOTE — PROGRESS NOTE ADULT - NS ATTEST RISK PROBLEM GEN_ALL_CORE FT
Patient overall improving clinically. I participated in direct patient care, patient education and ,  coordination with consultants, nursing and case management/social work teams, review of tests and scheduled medications.

## 2025-01-10 NOTE — ED CDU PROVIDER SUBSEQUENT DAY NOTE - PROGRESS NOTE DETAILS
Patient feels better compared to yesterday.  On exam is still wheezing.  Will await hospitalist recommendations. Seen by Dr Finnegan, cleared for dc with outpatient pulmonary follow up

## 2025-01-10 NOTE — ED CDU PROVIDER DISPOSITION NOTE - CARE PROVIDERS DIRECT ADDRESSES
,DirectAddress_Unknown,[email or web address]

## 2025-01-10 NOTE — ED CDU PROVIDER DISPOSITION NOTE - ATTENDING CONTRIBUTION TO CARE
discussed with neuro  evaluated patient  discharge planning  reviewed testing discussed with nhospitalist  evaluated patient  discharge planning  reviewed testing

## 2025-01-10 NOTE — ED CDU PROVIDER SUBSEQUENT DAY NOTE - ATTENDING APP SHARED VISIT CONTRIBUTION OF CARE
I saw and evaluated the patient on my own.  Briefly, I have the following impression and plan...  COPD exacerbation  Please see MDM for further details.

## 2025-01-10 NOTE — PROGRESS NOTE ADULT - SUBJECTIVE AND OBJECTIVE BOX
T H I S   I S    N O  T   A    F I N A L I Z E D   N O T JAMEL STARK  79y, Male  Allergy: No Known Allergies    ED OBS: 1d    Patient seen and examined earlier today.     PMH/PSH:    COPD  Smoker  Melanoma of skin  Basal cell carcinoma      [id number] EVENTS:  Reporting feeling drastically better     VITALS:  T(F): 97.9 (01-10-25 @ 08:18), Max: 97.9 (01-10-25 @ 08:18)  HR: 78 (01-10-25 @ 08:18)  BP: 137/71 (01-10-25 @ 08:18) (137/71 - 159/76)  RR: 18 (01-10-25 @ 08:18)  SpO2: 97% (01-10-25 @ 08:18) 2L          TESTS & MEASUREMENTS:  Weight/Height/BMI                            13.2   9.70  )-----------( 208      ( 09 Jan 2025 12:25 )             44.4         01-09    142  |  99  |  22[H]  ----------------------------<  92  5.2[H]   |  31  |  0.9    Ca    8.9      09 Jan 2025 12:25    TPro  6.8  /  Alb  3.8  /  TBili  0.3  /  DBili  x   /  AST  26  /  ALT  15  /  AlkPhos  114  01-09    LIVER FUNCTIONS - ( 09 Jan 2025 12:25 )  Alb: 3.8 g/dL / Pro: 6.8 g/dL / ALK PHOS: 114 U/L / ALT: 15 U/L / AST: 26 U/L / GGT: x               Troponin T, High Sensitivity Result: 23 ng/L (01-09-25 @ 14:43)  Troponin T, High Sensitivity Result: 25 ng/L (01-09-25 @ 12:25)      Urinalysis Basic - ( 09 Jan 2025 12:25 )    Color: x / Appearance: x / SG: x / pH: x  Gluc: 92 mg/dL / Ketone: x  / Bili: x / Urobili: x   Blood: x / Protein: x / Nitrite: x   Leuk Esterase: x / RBC: x / WBC x   Sq Epi: x / Non Sq Epi: x / Bacteria: x      pCO2, Venous: 80 mmHg (01.10.25 @ 06:15)  pCO2, Venous: 75 mmHg (01.09.25 @ 14:40)        RADIOLOGY, ECG, & ADDITIONAL TESTS:  12 Lead ECG:   Ventricular Rate 63 BPM  Atrial Rate 63 BPM  P-R Interval 182 ms  QRS Duration 78 ms  Q-T Interval 392 ms  QTC Calculation(Bazett) 401 ms  PAxis 71 degrees  R Axis 63 degrees  T Axis 72 degrees    Diagnosis Line Normal sinus rhythm  Normal ECG    Confirmed by Gerson Hernandez ([id number]) on 1/9/2025 8:22:10 PM (01-09-25 @ 13:49)      RECENT DIAGNOSTIC ORDERS:  Blood Gas Profile w/Lytes - Venous: 07:00 (01-09-25 @ 17:34)      MEDICATIONS:  MEDICATIONS  (STANDING):  albuterol/ipratropium for Nebulization 3 milliLiter(s) Nebulizer every 4 hours  fluticasone propionate/ salmeterol 250-50 MICROgram(s) Diskus 1 Dose(s) Inhalation two times a day  montelukast 10 milliGRAM(s) Oral daily      HOME MEDICATIONS (as per chart review):  Albuterol (Eqv-ProAir HFA) 90 mcg/inh inhalation aerosol (01-01)  albuterol 0.63 mg/3 mL (0.021%) inhalation solution (11-22)  budesonide-formoterol 160 mcg-4.5 mcg/inh inhalation aerosol (01-01)  Centrum Adults oral tablet (11-18)  Colace 50 mg oral capsule (11-18)  folic acid 1 mg oral tablet (11-18)  levoFLOXacin 750 mg oral tablet (01-01)  nebulizer (11-22)  senna leaf extract oral tablet (11-22)  Vitamin D2 50 mcg (2000 intl units) oral capsule (11-18)      PHYSICAL EXAM:  GENERAL: A&Ox3 and in no distress or pain , speaking in full sentences comfortably   CHEST/LUNG: faint wheezing but overall good air entry bilaterally   HEART: soft   ABDOMEN: BS+/ non tender   EXTREMITIES:  chronic skin changes              JT JAMEL  79y, Male  Allergy: No Known Allergies    ED OBS: 1d    Patient seen and examined earlier today.     PMH/PSH:    COPD  Smoker  Melanoma of skin  Basal cell carcinoma      [id number] EVENTS:  Reporting feeling drastically better     VITALS:  T(F): 97.9 (01-10-25 @ 08:18), Max: 97.9 (01-10-25 @ 08:18)  HR: 78 (01-10-25 @ 08:18)  BP: 137/71 (01-10-25 @ 08:18) (137/71 - 159/76)  RR: 18 (01-10-25 @ 08:18)  SpO2: 97% (01-10-25 @ 08:18) 2L          TESTS & MEASUREMENTS:  Weight/Height/BMI                            13.2   9.70  )-----------( 208      ( 09 Jan 2025 12:25 )             44.4         01-09    142  |  99  |  22[H]  ----------------------------<  92  5.2[H]   |  31  |  0.9    Ca    8.9      09 Jan 2025 12:25    TPro  6.8  /  Alb  3.8  /  TBili  0.3  /  DBili  x   /  AST  26  /  ALT  15  /  AlkPhos  114  01-09    LIVER FUNCTIONS - ( 09 Jan 2025 12:25 )  Alb: 3.8 g/dL / Pro: 6.8 g/dL / ALK PHOS: 114 U/L / ALT: 15 U/L / AST: 26 U/L / GGT: x               Troponin T, High Sensitivity Result: 23 ng/L (01-09-25 @ 14:43)  Troponin T, High Sensitivity Result: 25 ng/L (01-09-25 @ 12:25)      Urinalysis Basic - ( 09 Jan 2025 12:25 )    Color: x / Appearance: x / SG: x / pH: x  Gluc: 92 mg/dL / Ketone: x  / Bili: x / Urobili: x   Blood: x / Protein: x / Nitrite: x   Leuk Esterase: x / RBC: x / WBC x   Sq Epi: x / Non Sq Epi: x / Bacteria: x      pCO2, Venous: 80 mmHg (01.10.25 @ 06:15)  pCO2, Venous: 75 mmHg (01.09.25 @ 14:40)        RADIOLOGY, ECG, & ADDITIONAL TESTS:  12 Lead ECG:   Ventricular Rate 63 BPM  Atrial Rate 63 BPM  P-R Interval 182 ms  QRS Duration 78 ms  Q-T Interval 392 ms  QTC Calculation(Bazett) 401 ms  PAxis 71 degrees  R Axis 63 degrees  T Axis 72 degrees    Diagnosis Line Normal sinus rhythm  Normal ECG    Confirmed by Gerson Hernandez ([id number]) on 1/9/2025 8:22:10 PM (01-09-25 @ 13:49)      RECENT DIAGNOSTIC ORDERS:  Blood Gas Profile w/Lytes - Venous: 07:00 (01-09-25 @ 17:34)      MEDICATIONS:  MEDICATIONS  (STANDING):  albuterol/ipratropium for Nebulization 3 milliLiter(s) Nebulizer every 4 hours  fluticasone propionate/ salmeterol 250-50 MICROgram(s) Diskus 1 Dose(s) Inhalation two times a day  montelukast 10 milliGRAM(s) Oral daily      HOME MEDICATIONS (as per chart review):  Albuterol (Eqv-ProAir HFA) 90 mcg/inh inhalation aerosol (01-01)  albuterol 0.63 mg/3 mL (0.021%) inhalation solution (11-22)  budesonide-formoterol 160 mcg-4.5 mcg/inh inhalation aerosol (01-01)  Centrum Adults oral tablet (11-18)  Colace 50 mg oral capsule (11-18)  folic acid 1 mg oral tablet (11-18)  levoFLOXacin 750 mg oral tablet (01-01)  nebulizer (11-22)  senna leaf extract oral tablet (11-22)  Vitamin D2 50 mcg (2000 intl units) oral capsule (11-18)      PHYSICAL EXAM:  GENERAL: A&Ox3 and in no distress or pain , speaking in full sentences comfortably   CHEST/LUNG: faint wheezing but overall good air entry bilaterally   HEART: soft   ABDOMEN: BS+/ non tender   EXTREMITIES:  chronic skin changes

## 2025-01-10 NOTE — ED CDU PROVIDER DISPOSITION NOTE - CARE PROVIDER_API CALL
your PMD,   Phone: (   )    -  Fax: (   )    -  Follow Up Time: 1-3 Days    Waylon Voss  Critical Care Medicine  [address] [place] [address], [address] [address]  Phone: [phone]  Fax: [phone]  Follow Up Time: 1-3 Days

## 2025-01-10 NOTE — ED CDU PROVIDER DISPOSITION NOTE - NSFOLLOWUPINSTRUCTIONS_ED_ALL_ED_FT
Our Emergency Department Referral Coordinators will be reaching out to you in the next 24-48 hours from 9:00am to 5:00pm to schedule a follow up appointment. Please expect a phone call from the hospital in that time frame. If you do not receive a call or if you have any questions or concerns, you can reach them at   [phone].      Shortness of breath    Shortness of breath means you have trouble breathing and could indicate a medical problem. Causes include lung diseases, heart disease, low amount of red blood cells (anemia), poor physical fitness, being overweight, smoking, etc. Your health care provider may not be able to find a cause for your shortness of breath after your exam. In this case, it is important to have a follow-up exam with your primary care physician as instructed. If medicines were prescribed, take them as directed for the full length of time directed. Refrain from tobacco products.    SEEK IMMEDIATE MEDICAL CARE IF YOU HAVE THE FOLLOWING SYMPTOMS: worsening shortness of breath, chest pain, back pain, abdominal pain, fever, coughing up blood, lightheadedness/dizziness.

## 2025-01-10 NOTE — ED CDU PROVIDER DISPOSITION NOTE - PROVIDER TOKENS
FREE:[LAST:[your PMD],PHONE:[(   )    -],FAX:[(   )    -],FOLLOWUP:[1-3 Days]],PROVIDER:[TOKEN:[[id number]],FOLLOWUP:[1-3 Days]]

## 2025-01-10 NOTE — ED CDU PROVIDER SUBSEQUENT DAY NOTE - PHYSICAL EXAMINATION
Gen: Alert, NAD, well appearing  	Head: NC, AT, PERRL, EOMI, normal lids/conjunctiva  	ENT: normal hearing  	Neck: +supple, no tenderness/meningismus,  	Pulm: Bilateral BS, normal resp effort, minimal wheeze b/l, on 3L NC.   	CV: RRR  	Abd: soft, NT/ND  	Mskel: no edema/erythema/cyanosis  	Skin: no rash, warm/dry  Neuro: AAOx3, no sensory/motor deficits

## 2025-01-10 NOTE — ED CDU PROVIDER DISPOSITION NOTE - PATIENT PORTAL LINK FT
You can access the FollowMyHealth Patient Portal offered by [facility] by registering at the following website: [email or web address]. By joining [email or web address] FollowMyHealth portal, you will also be able to view your health information using other applications (apps) compatible with our system.

## 2025-01-16 ENCOUNTER — TRANSCRIPTION ENCOUNTER (OUTPATIENT)
Age: 80
End: 2025-01-16

## 2025-01-28 ENCOUNTER — TRANSCRIPTION ENCOUNTER (OUTPATIENT)
Age: 80
End: 2025-01-28

## 2025-04-27 NOTE — H&P ADULT - NSICDXFAMHXNEG_GEN_ALL
Feels weak in the bilateral lower extremities primarily in the left lower extremity greater than right but appears functional on examination  Some tremoring in the left lower extremity on MMT  Continue with physical and Occupational Therapy   unknown